# Patient Record
Sex: MALE | Race: WHITE | Employment: OTHER | URBAN - METROPOLITAN AREA
[De-identification: names, ages, dates, MRNs, and addresses within clinical notes are randomized per-mention and may not be internally consistent; named-entity substitution may affect disease eponyms.]

---

## 2018-01-02 ENCOUNTER — HOSPITAL ENCOUNTER (OUTPATIENT)
Dept: ENDOSCOPY | Age: 68
Discharge: HOME | End: 2018-01-21
Attending: INTERNAL MEDICINE | Admitting: INTERNAL MEDICINE

## 2018-01-03 RX ORDER — SODIUM CHLORIDE 9 MG/ML
INJECTION, SOLUTION INTRAVENOUS CONTINUOUS
Status: DISCONTINUED | OUTPATIENT
Start: 2018-01-03 | End: 2018-01-05 | Stop reason: HOSPADM

## 2018-01-04 ENCOUNTER — HOSPITAL ENCOUNTER (OUTPATIENT)
Dept: ENDOSCOPY | Age: 68
Discharge: HOME | End: 2018-01-04
Attending: INTERNAL MEDICINE | Admitting: INTERNAL MEDICINE

## 2018-01-04 VITALS
OXYGEN SATURATION: 97 % | WEIGHT: 194 LBS | TEMPERATURE: 98.8 F | RESPIRATION RATE: 16 BRPM | HEIGHT: 71 IN | SYSTOLIC BLOOD PRESSURE: 138 MMHG | HEART RATE: 110 BPM | BODY MASS INDEX: 27.16 KG/M2 | DIASTOLIC BLOOD PRESSURE: 83 MMHG

## 2018-01-04 RX ADMIN — SODIUM CHLORIDE: 9 INJECTION, SOLUTION INTRAVENOUS at 10:17

## 2018-01-04 ASSESSMENT — PAIN SCALES - GENERAL
PAINLEVEL_OUTOF10: 0
PAINLEVEL_OUTOF10: 0

## 2018-01-04 ASSESSMENT — ENCOUNTER SYMPTOMS: SHORTNESS OF BREATH: 1

## 2018-01-04 ASSESSMENT — PAIN - FUNCTIONAL ASSESSMENT: PAIN_FUNCTIONAL_ASSESSMENT: 0-10
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
## 2018-01-04 NOTE — PROGRESS NOTES
D/C instructions to pt and spouse. Verbalizes understanding. Copy of instructions to pt and spouse. D/c to home without distress.   Electronically signed by Anil Espitia RN on 1/4/2018 at 11:14 AM

## 2018-08-17 ENCOUNTER — PAT TELEPHONE (OUTPATIENT)
Dept: PREADMISSION TESTING | Age: 68
End: 2018-08-17

## 2018-08-17 VITALS — WEIGHT: 205 LBS | BODY MASS INDEX: 27.77 KG/M2 | HEIGHT: 72 IN

## 2018-08-17 RX ORDER — NADOLOL 20 MG/1
20 TABLET ORAL 3 TIMES DAILY
COMMUNITY
End: 2018-08-17 | Stop reason: ALTCHOICE

## 2018-08-17 RX ORDER — POTASSIUM CHLORIDE 1.5 G/1.77G
20 POWDER, FOR SOLUTION ORAL DAILY
Status: ON HOLD | COMMUNITY
End: 2019-05-29 | Stop reason: HOSPADM

## 2018-08-17 RX ORDER — LACTULOSE 10 G/15ML
20 SOLUTION ORAL; RECTAL 3 TIMES DAILY PRN
COMMUNITY
Start: 2017-10-19 | End: 2019-05-24

## 2018-08-17 RX ORDER — TORSEMIDE 20 MG/1
TABLET ORAL
Status: ON HOLD | COMMUNITY
Start: 2017-10-05 | End: 2019-07-25 | Stop reason: HOSPADM

## 2018-08-17 RX ORDER — MAGNESIUM CHLORIDE 64 MG
1 TABLET, DELAYED RELEASE (ENTERIC COATED) ORAL
COMMUNITY
End: 2018-08-17 | Stop reason: ALTCHOICE

## 2018-08-17 RX ORDER — TORSEMIDE 20 MG/1
20 TABLET ORAL DAILY
COMMUNITY
End: 2018-08-17 | Stop reason: ALTCHOICE

## 2018-08-17 RX ORDER — LANSOPRAZOLE 30 MG/1
30 CAPSULE, DELAYED RELEASE ORAL DAILY PRN
COMMUNITY
End: 2019-07-02

## 2018-08-17 RX ORDER — PROMETHAZINE HYDROCHLORIDE 12.5 MG/1
12.5 TABLET ORAL EVERY 6 HOURS PRN
COMMUNITY
Start: 2017-12-22 | End: 2019-05-24

## 2018-08-17 RX ORDER — POLYETHYLENE GLYCOL 3350 17 G/17G
17 POWDER, FOR SOLUTION ORAL DAILY PRN
Status: ON HOLD | COMMUNITY
End: 2019-05-29 | Stop reason: HOSPADM

## 2018-08-17 RX ORDER — THIAMINE MONONITRATE (VIT B1) 100 MG
100 TABLET ORAL DAILY
Status: ON HOLD | COMMUNITY
End: 2019-12-24

## 2018-08-17 ASSESSMENT — PAIN - FUNCTIONAL ASSESSMENT: PAIN_FUNCTIONAL_ASSESSMENT: 0-10

## 2018-08-17 ASSESSMENT — PAIN DESCRIPTION - PAIN TYPE: TYPE: CHRONIC PAIN

## 2018-08-17 ASSESSMENT — PAIN DESCRIPTION - LOCATION: LOCATION: HIP;KNEE;BACK

## 2018-08-17 ASSESSMENT — PAIN SCALES - GENERAL: PAINLEVEL_OUTOF10: 8

## 2018-08-17 ASSESSMENT — PAIN DESCRIPTION - FREQUENCY: FREQUENCY: CONTINUOUS

## 2018-08-17 NOTE — PROGRESS NOTES
STARTED THE PT INTERVIEW BUT BOTH PREETHI AND WIFE  WERE NOT SURE OF HIS MEDICATION DOSAGES. WIFE WILL CALL BACK LATER TODAY OR Monday WHEN SHE HAS A LIST TO REVIEW THEM.   8/17/18  315 AF

## 2018-08-17 NOTE — PRE-PROCEDURE INSTRUCTIONS
[address] [facility] time__1330__________        Surgery time___1500_________    Take the following medications with a sip of water: NADOLOL,CYMBALTA    Do not eat or drink anything after 12:00 midnight prior to your surgery. This includes water chewing gum, mints and ice chips. You may brush your teeth and gargle the morning of your surgery, but do not swallow the water     Please see your family doctor/pediatrician for a history and physical and/or concerning medications. Bring any test results/reports from your physicians office. If you are under the care of a heart doctor or specialist doctor, please be aware that you may be asked to them for clearance    You may be asked to stop blood thinners such as Coumadin, Plavix, Fragmin, Lovenox, etc., or any anti-inflammatories such as:  Aspirin, Ibuprofen, Advil, Naproxen prior to your surgery. We also ask that you stop any OTC medications such as fish oil, vitamin E, glucosamine, garlic, Multivitamins, COQ 10, etc.MAY TAKE TYLENOL    We ask that you do not smoke 24 hours prior to surgery  We ask that you do not  drink any alcoholic beverages 24 hours prior to surgery     You must make arrangements for a responsible adult to take you home after your surgery. For your safety you will not be allowed to leave alone or drive yourself home. Your surgery will be cancelled if you do not have a ride home. Also for your safety, it is strongly suggested that someone stay with you the first 24 hours after your surgery. A parent or legal guardian must accompany a child scheduled for surgery and plan to stay at the hospital until the child is discharged. Please do not bring other children with you. For your comfort, please wear simple loose fitting clothing to the hospital.  Please do not bring valuables.     Do not wear any make-up or nail polish on your fingers or toes      For your safety, please do not wear any jewelry or body piercing's on the day of surgery. All jewelry must be removed. If you have dentures, they will be removed before going to operating room. For your convenience, we will provide you with a container. If you wear contact lenses or glasses, they will be removed, please bring a case for them. If you have a living will and a durable power of  for healthcare, please bring in a copy. As part of our patient safety program to minimize surgical site infections, we ask you to do the following:    · Please notify your surgeon if you develop any illness between         now and the  day of your surgery. · This includes a cough, cold, fever, sore throat, nausea,         or vomiting, and diarrhea, etc.  ·  Please notify your surgeon if you experience dizziness, shortness         of breath or blurred vision between now and the time of your surgery. Do not shave your operative site 96 hours prior to surgery. For face and neck surgery, men may use an electric razor 48 hours   prior to surgery. You may shower the night before surgery or the morning of   your surgery with an antibacterial soap. You will need to bring a photo ID and insurance card    [facility] has an onsite pharmacy, would you like to utilize our pharmacy     If you will be staying overnight and use a C-pap machine, please bring   your C-pap to hospital     Our goal is to provide you with excellent care, therefore, visitors will be limited to two(2) in the room at a time so that we may focus on providing this care for you. Please contact pre-admission testing if you have any further questions. [facility] phone number:  [phone] [facility] fax number:  [phone]  Please note these are generalized instructions for all surgical cases, you may be provided with more specific instructions according to your surgery.

## 2018-08-20 ENCOUNTER — SURG/PROC ORDERS (OUTPATIENT)
Dept: ANESTHESIOLOGY | Age: 68
End: 2018-08-20

## 2018-08-20 LAB
A/G RATIO: 0.7 (ref 1.1–2.2)
ALBUMIN SERPL-MCNC: 2.7 G/DL (ref 3.4–5)
ALP BLD-CCNC: 139 U/L (ref 40–129)
ALT SERPL-CCNC: 36 U/L (ref 10–40)
ANION GAP SERPL CALCULATED.3IONS-SCNC: 10 MMOL/L (ref 3–16)
ANISOCYTOSIS: ABNORMAL
AST SERPL-CCNC: 79 U/L (ref 15–37)
BASOPHILS ABSOLUTE: 0.1 K/UL (ref 0–0.2)
BASOPHILS RELATIVE PERCENT: 2.1 %
BILIRUB SERPL-MCNC: 4.1 MG/DL (ref 0–1)
BUN BLDV-MCNC: 6 MG/DL (ref 7–20)
CALCIUM SERPL-MCNC: 9.1 MG/DL (ref 8.3–10.6)
CHLORIDE BLD-SCNC: 104 MMOL/L (ref 99–110)
CO2: 30 MMOL/L (ref 21–32)
CREAT SERPL-MCNC: 0.8 MG/DL (ref 0.8–1.3)
EOSINOPHILS ABSOLUTE: 0.1 K/UL (ref 0–0.6)
EOSINOPHILS RELATIVE PERCENT: 1.7 %
GFR AFRICAN AMERICAN: >60
GFR NON-AFRICAN AMERICAN: >60
GLOBULIN: 3.7 G/DL
GLUCOSE BLD-MCNC: 119 MG/DL (ref 70–99)
HCT VFR BLD CALC: 33.1 % (ref 40.5–52.5)
HEMATOLOGY PATH CONSULT: YES
HEMOGLOBIN: 11.2 G/DL (ref 13.5–17.5)
HYPOCHROMIA: ABNORMAL
LYMPHOCYTES ABSOLUTE: 1.6 K/UL (ref 1–5.1)
LYMPHOCYTES RELATIVE PERCENT: 49.8 %
MACROCYTES: ABNORMAL
MCH RBC QN AUTO: 38.5 PG (ref 26–34)
MCHC RBC AUTO-ENTMCNC: 33.9 G/DL (ref 31–36)
MCV RBC AUTO: 113.6 FL (ref 80–100)
MONOCYTES ABSOLUTE: 0.5 K/UL (ref 0–1.3)
MONOCYTES RELATIVE PERCENT: 15.2 %
NEUTROPHILS ABSOLUTE: 1 K/UL (ref 1.7–7.7)
NEUTROPHILS RELATIVE PERCENT: 31.2 %
PDW BLD-RTO: 16.2 % (ref 12.4–15.4)
PLATELET # BLD: 52 K/UL (ref 135–450)
PMV BLD AUTO: 10.7 FL (ref 5–10.5)
POIKILOCYTES: ABNORMAL
POTASSIUM SERPL-SCNC: 3.3 MMOL/L (ref 3.5–5.1)
RBC # BLD: 2.91 M/UL (ref 4.2–5.9)
SODIUM BLD-SCNC: 144 MMOL/L (ref 136–145)
TOTAL PROTEIN: 6.4 G/DL (ref 6.4–8.2)
WBC # BLD: 3.2 K/UL (ref 4–11)

## 2018-08-20 RX ORDER — SODIUM CHLORIDE 0.9 % (FLUSH) 0.9 %
10 SYRINGE (ML) INJECTION EVERY 12 HOURS SCHEDULED
Status: CANCELLED | OUTPATIENT
Start: 2018-08-20 | End: 2019-08-20

## 2018-08-20 RX ORDER — SODIUM CHLORIDE 0.9 % (FLUSH) 0.9 %
10 SYRINGE (ML) INJECTION PRN
Status: CANCELLED | OUTPATIENT
Start: 2018-08-20 | End: 2019-08-20

## 2018-08-20 RX ORDER — SODIUM CHLORIDE 9 MG/ML
INJECTION, SOLUTION INTRAVENOUS CONTINUOUS
Status: CANCELLED | OUTPATIENT
Start: 2018-08-20 | End: 2019-08-20

## 2018-08-21 LAB — HEMATOLOGY PATH CONSULT: NORMAL

## 2018-08-22 ENCOUNTER — HOSPITAL ENCOUNTER (OUTPATIENT)
Dept: ENDOSCOPY | Age: 68
Discharge: HOME | End: 2018-08-22
Attending: INTERNAL MEDICINE | Admitting: INTERNAL MEDICINE

## 2018-08-22 VITALS
OXYGEN SATURATION: 100 % | TEMPERATURE: 98 F | HEIGHT: 72 IN | BODY MASS INDEX: 27.05 KG/M2 | WEIGHT: 199.74 LBS | DIASTOLIC BLOOD PRESSURE: 85 MMHG | HEART RATE: 95 BPM | SYSTOLIC BLOOD PRESSURE: 156 MMHG | RESPIRATION RATE: 18 BRPM

## 2018-08-22 LAB
A/G RATIO: 0.7 (ref 1.1–2.2)
ALBUMIN SERPL-MCNC: 2.8 G/DL (ref 3.4–5)
ALP BLD-CCNC: 89 U/L (ref 40–129)
ALT SERPL-CCNC: 34 U/L (ref 10–40)
ANION GAP SERPL CALCULATED.3IONS-SCNC: 15 MMOL/L (ref 3–16)
AST SERPL-CCNC: 80 U/L (ref 15–37)
BILIRUB SERPL-MCNC: 7.9 MG/DL (ref 0–1)
BUN BLDV-MCNC: 10 MG/DL (ref 7–20)
CALCIUM SERPL-MCNC: 9 MG/DL (ref 8.3–10.6)
CHLORIDE BLD-SCNC: 104 MMOL/L (ref 99–110)
CO2: 22 MMOL/L (ref 21–32)
CREAT SERPL-MCNC: 0.7 MG/DL (ref 0.8–1.3)
GFR AFRICAN AMERICAN: >60
GFR NON-AFRICAN AMERICAN: >60
GLOBULIN: 4.2 G/DL
GLUCOSE BLD-MCNC: 84 MG/DL (ref 70–99)
MAGNESIUM: 1.1 MG/DL (ref 1.8–2.4)
POTASSIUM REFLEX MAGNESIUM: 3.3 MMOL/L (ref 3.5–5.1)
SODIUM BLD-SCNC: 141 MMOL/L (ref 136–145)
TOTAL PROTEIN: 7 G/DL (ref 6.4–8.2)

## 2018-08-22 PROCEDURE — 93010 ELECTROCARDIOGRAM REPORT: CPT | Performed by: INTERNAL MEDICINE

## 2018-08-22 RX ORDER — SODIUM CHLORIDE 0.9 % (FLUSH) 0.9 %
10 SYRINGE (ML) INJECTION PRN
Status: DISCONTINUED | OUTPATIENT
Start: 2018-08-22 | End: 2018-08-23 | Stop reason: HOSPADM

## 2018-08-22 RX ORDER — SODIUM CHLORIDE 0.9 % (FLUSH) 0.9 %
10 SYRINGE (ML) INJECTION EVERY 12 HOURS SCHEDULED
Status: DISCONTINUED | OUTPATIENT
Start: 2018-08-22 | End: 2018-08-23 | Stop reason: HOSPADM

## 2018-08-22 RX ORDER — SODIUM CHLORIDE 9 MG/ML
INJECTION, SOLUTION INTRAVENOUS CONTINUOUS
Status: DISCONTINUED | OUTPATIENT
Start: 2018-08-22 | End: 2018-08-23 | Stop reason: HOSPADM

## 2018-08-22 RX ADMIN — SODIUM CHLORIDE: 9 INJECTION, SOLUTION INTRAVENOUS at 14:03

## 2018-08-22 ASSESSMENT — LIFESTYLE VARIABLES: SMOKING_STATUS: 0

## 2018-08-22 ASSESSMENT — ENCOUNTER SYMPTOMS: SHORTNESS OF BREATH: 1

## 2018-08-22 ASSESSMENT — PAIN SCALES - GENERAL: PAINLEVEL_OUTOF10: 0

## 2018-08-22 ASSESSMENT — PAIN - FUNCTIONAL ASSESSMENT: PAIN_FUNCTIONAL_ASSESSMENT: 0-10
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
## 2018-08-22 NOTE — ANESTHESIA PRE-OP
Department of Anesthesiology  Preprocedure Note       Name:  Myrna Smith   Age:  80 y.o.  :  1950                                          MRN:  [id number]         Date:  2018      Surgeon:  Lenny Quesada    Procedure:  Colonoscopy    Medications prior to admission:   Prior to Admission medications    Medication Sig Start Date End Date Taking?  Authorizing Provider   vitamin B-1 (THIAMINE) 100 MG tablet Take 100 mg by mouth daily   Yes Historical Provider, MD   torsemide (DEMADEX) 20 MG tablet TAKE 1 TABLET BY MOUTH ONE TIME A DAY  10/5/17  Yes Historical Provider, MD   potassium chloride (KLOR-CON) 20 MEQ packet Take 20 mEq by mouth daily   Yes Historical Provider, MD   lansoprazole (PREVACID) 30 MG delayed release capsule Take 30 mg by mouth daily    Yes Historical Provider, MD   lactulose encephalopathy 10 GM/15ML SOLN solution Take 20 g by mouth 3 times daily as needed  10/19/17  Yes Historical Provider, MD   magnesium chloride (MAG DELAY) 535 (64 Mg) MG TBCR extended release tablet Take 64 mg by mouth 3 times daily  10/31/17  Yes Historical Provider, MD   polyethylene glycol (GLYCOLAX) packet Take 17 g by mouth daily as needed    Yes Historical Provider, MD   celecoxib (CELEBREX) 200 MG capsule TAKE ONE OR TWO CAPSULES BY MOUTH DAILY 3/7/16  Yes Historical Provider, MD   DULoxetine (CYMBALTA) 30 MG capsule TAKE 1 CAPSULE BY MOUTH ONE TIME A DAY increase to 2 qday after 2 weeks 11/6/15  Yes Historical Provider, MD   finasteride (PROSCAR) 5 MG tablet Take 5 mg by mouth daily  16  Yes Historical Provider, MD   folic acid (FOLVITE) 1 MG tablet Take 1 twice a day 3/7/16  Yes Historical Provider, MD   nadolol (CORGARD) 20 MG tablet Take 20 mg by mouth daily  11/19/15  Yes Historical Provider, MD   promethazine (PHENERGAN) 12.5 MG tablet Take 12.5 mg by mouth every 6 hours as needed  17   Historical Provider, MD       Current medications:    Current Outpatient Prescriptions   Medication Sig Dispense Refill    vitamin B-1 (THIAMINE) 100 MG tablet Take 100 mg by mouth daily      torsemide (DEMADEX) 20 MG tablet TAKE 1 TABLET BY MOUTH ONE TIME A DAY       potassium chloride (KLOR-CON) 20 MEQ packet Take 20 mEq by mouth daily      lansoprazole (PREVACID) 30 MG delayed release capsule Take 30 mg by mouth daily       lactulose encephalopathy 10 GM/15ML SOLN solution Take 20 g by mouth 3 times daily as needed       magnesium chloride (MAG DELAY) 535 (64 Mg) MG TBCR extended release tablet Take 64 mg by mouth 3 times daily       polyethylene glycol (GLYCOLAX) packet Take 17 g by mouth daily as needed       celecoxib (CELEBREX) 200 MG capsule TAKE ONE OR TWO CAPSULES BY MOUTH DAILY      DULoxetine (CYMBALTA) 30 MG capsule TAKE 1 CAPSULE BY MOUTH ONE TIME A DAY increase to 2 qday after 2 weeks      finasteride (PROSCAR) 5 MG tablet Take 5 mg by mouth daily       folic acid (FOLVITE) 1 MG tablet Take 1 twice a day      nadolol (CORGARD) 20 MG tablet Take 20 mg by mouth daily       promethazine (PHENERGAN) 12.5 MG tablet Take 12.5 mg by mouth every 6 hours as needed        Current Facility-Administered Medications   Medication Dose Route Frequency Provider Last Rate Last Dose    0.9 % sodium chloride infusion   Intravenous Continuous Dennis Loredo MD 75 mL/hr at 08/22/18 1403      sodium chloride flush 0.9 % injection 10 mL  10 mL Intravenous 2 times per day Dennis Loredo MD        sodium chloride flush 0.9 % injection 10 mL  10 mL Intravenous PRN Dennis Loredo MD           Allergies:  No Known Allergies    Problem List:  There is no problem list on file for this patient.       Past Medical History:        Diagnosis Date    Abnormality of gait     Alcohol dependence ([facility] [address]     Alcoholic cirrhosis (HCC)     Anxiety     Arthritis     rheumatoid arthritis    Basal cell carcinoma     Cerebral artery occlusion with cerebral infarction (HCC) 20 YEARS AGO    Circulation

## 2018-08-22 NOTE — PROGRESS NOTES
Patient came in PACU, alert and denies any pain, offered snack and tolerated. Discharge ninstruction given to wife.

## 2018-08-22 NOTE — ANESTHESIA POST-OP
[facility] Department of Anesthesiology  Post-Anesthesia Note       Name:  Akosua Iglesias                                  Age:  80 y.o. MRN:  [id number]     Last Vitals & Oxygen Saturation: BP (!) 156/85   Pulse 95   Temp 98 °F (36.7 °C)   Resp 18   Ht 6' (1.829 m)   Wt 199 lb 11.8 oz (90.6 kg)   SpO2 100%   BMI 27.09 kg/m²   Patient Vitals for the past 4 hrs:   BP Temp Temp src Pulse Resp SpO2 Height Weight   08/22/18 1610 (!) 156/85 - - 95 18 100 % - -   08/22/18 1546 129/76 98 °F (36.7 °C) - 75 12 100 % - -   08/22/18 1541 (!) 137/110 - - 76 20 99 % - -   08/22/18 1540 - - - 80 15 100 % - -   08/22/18 1536 122/77 - - 80 16 99 % - -   08/22/18 1535 - - - 80 16 100 % - -   08/22/18 1531 117/80 - - 85 17 95 % - -   08/22/18 1527 111/68 98.3 °F (36.8 °C) Temporal 89 18 99 % - -   08/22/18 1355 (!) 146/87 98.9 °F (37.2 °C) Temporal 103 16 99 % 6' (1.829 m) 199 lb 11.8 oz (90.6 kg)       Level of consciousness:  Awake, alert    Respiratory: Respirations easy, no distress. Stable. Cardiovascular: Hemodynamically stable. Hydration: Adequate. PONV: Adequately managed. Post-op pain: Adequately controlled. Post-op assessment: Tolerated anesthetic well without complication. Complications:  None.     Jim Keller MD  August 22, 2018   4:52 PM

## 2018-08-23 LAB
EKG ATRIAL RATE: 92 BPM
EKG DIAGNOSIS: NORMAL
EKG P AXIS: 30 DEGREES
EKG P-R INTERVAL: 148 MS
EKG Q-T INTERVAL: 428 MS
EKG QRS DURATION: 90 MS
EKG QTC CALCULATION (BAZETT): 529 MS
EKG R AXIS: 32 DEGREES
EKG T AXIS: 46 DEGREES
EKG VENTRICULAR RATE: 92 BPM

## 2018-08-27 LAB
HCT VFR BLD CALC: 34.4 % (ref 40.5–52.5)
HEMATOLOGY PATH CONSULT: NORMAL
HEMATOLOGY PATH CONSULT: YES
HEMOGLOBIN: 11.7 G/DL (ref 13.5–17.5)
MCH RBC QN AUTO: 38.6 PG (ref 26–34)
MCHC RBC AUTO-ENTMCNC: 34 G/DL (ref 31–36)
MCV RBC AUTO: 113.6 FL (ref 80–100)
PDW BLD-RTO: 15.6 % (ref 12.4–15.4)
PLATELET # BLD: 51 K/UL (ref 135–450)
PMV BLD AUTO: 10.9 FL (ref 5–10.5)
RBC # BLD: 3.03 M/UL (ref 4.2–5.9)
WBC # BLD: 3.7 K/UL (ref 4–11)

## 2019-05-24 ENCOUNTER — APPOINTMENT (OUTPATIENT)
Dept: GENERAL RADIOLOGY | Age: 69
DRG: 432 | End: 2019-05-24
Payer: MEDICARE

## 2019-05-24 ENCOUNTER — HOSPITAL ENCOUNTER (INPATIENT)
Age: 69
LOS: 5 days | Discharge: NURSING, REHAB OR LONG-TERM CARE | DRG: 432 | End: 2019-05-29
Attending: EMERGENCY MEDICINE | Admitting: INTERNAL MEDICINE
Payer: MEDICARE

## 2019-05-24 ENCOUNTER — APPOINTMENT (OUTPATIENT)
Dept: CT IMAGING | Age: 69
DRG: 432 | End: 2019-05-24
Payer: MEDICARE
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
## 2019-05-24 DIAGNOSIS — D18.1 HYGROMA: ICD-10-CM

## 2019-05-24 DIAGNOSIS — K76.82 HEPATIC ENCEPHALOPATHY: Primary | ICD-10-CM

## 2019-05-24 PROBLEM — N39.0 URINARY TRACT INFECTION ASSOCIATED WITH CYSTOSTOMY CATHETER (HCC): Status: ACTIVE | Noted: 2019-05-24

## 2019-05-24 PROBLEM — E72.20 HYPERAMMONEMIA (HCC): Status: ACTIVE | Noted: 2019-05-24

## 2019-05-24 PROBLEM — R53.1 GENERALIZED WEAKNESS: Status: ACTIVE | Noted: 2019-05-24

## 2019-05-24 PROBLEM — I48.0 PAROXYSMAL ATRIAL FIBRILLATION (HCC): Status: ACTIVE | Noted: 2019-05-24

## 2019-05-24 PROBLEM — D69.6 THROMBOCYTOPENIA (HCC): Status: ACTIVE | Noted: 2019-05-24

## 2019-05-24 PROBLEM — E80.6 HYPERBILIRUBINEMIA: Status: ACTIVE | Noted: 2019-05-24

## 2019-05-24 PROBLEM — E83.42 HYPOMAGNESEMIA: Status: ACTIVE | Noted: 2019-05-24

## 2019-05-24 PROBLEM — E87.6 HYPOKALEMIA: Status: ACTIVE | Noted: 2019-05-24

## 2019-05-24 PROBLEM — N30.01 ACUTE CYSTITIS WITH HEMATURIA: Status: ACTIVE | Noted: 2019-05-24

## 2019-05-24 PROBLEM — T83.510A URINARY TRACT INFECTION ASSOCIATED WITH CYSTOSTOMY CATHETER (HCC): Status: ACTIVE | Noted: 2019-05-24

## 2019-05-24 LAB
A/G RATIO: 0.6 (ref 1.1–2.2)
ALBUMIN SERPL-MCNC: 2.8 G/DL (ref 3.4–5)
ALP BLD-CCNC: 129 U/L (ref 40–129)
ALT SERPL-CCNC: 14 U/L (ref 10–40)
AMMONIA: 105 UMOL/L (ref 16–60)
AMPHETAMINE SCREEN, URINE: NORMAL
ANION GAP SERPL CALCULATED.3IONS-SCNC: 12 MMOL/L (ref 3–16)
AST SERPL-CCNC: 32 U/L (ref 15–37)
BACTERIA: ABNORMAL /HPF
BARBITURATE SCREEN URINE: NORMAL
BASE EXCESS VENOUS: 6.5 MMOL/L
BASOPHILS ABSOLUTE: 0.1 K/UL (ref 0–0.2)
BASOPHILS RELATIVE PERCENT: 1.1 %
BENZODIAZEPINE SCREEN, URINE: NORMAL
BILIRUB SERPL-MCNC: 2.8 MG/DL (ref 0–1)
BILIRUBIN URINE: NEGATIVE
BLOOD, URINE: ABNORMAL
BUN BLDV-MCNC: 10 MG/DL (ref 7–20)
CALCIUM SERPL-MCNC: 9.1 MG/DL (ref 8.3–10.6)
CANNABINOID SCREEN URINE: NORMAL
CARBOXYHEMOGLOBIN: 2 %
CHLORIDE BLD-SCNC: 100 MMOL/L (ref 99–110)
CLARITY: ABNORMAL
CO2: 29 MMOL/L (ref 21–32)
COCAINE METABOLITE SCREEN URINE: NORMAL
COLOR: YELLOW
CREAT SERPL-MCNC: 1.3 MG/DL (ref 0.8–1.3)
EKG ATRIAL RATE: 54 BPM
EKG DIAGNOSIS: NORMAL
EKG P AXIS: 13 DEGREES
EKG P-R INTERVAL: 170 MS
EKG Q-T INTERVAL: 652 MS
EKG QRS DURATION: 102 MS
EKG QTC CALCULATION (BAZETT): 618 MS
EKG R AXIS: 21 DEGREES
EKG T AXIS: 18 DEGREES
EKG VENTRICULAR RATE: 54 BPM
EOSINOPHILS ABSOLUTE: 0.1 K/UL (ref 0–0.6)
EOSINOPHILS RELATIVE PERCENT: 2.4 %
EPITHELIAL CELLS, UA: 1 /HPF (ref 0–5)
ETHANOL: NORMAL MG/DL (ref 0–0.08)
GFR AFRICAN AMERICAN: >60
GFR NON-AFRICAN AMERICAN: 55
GLOBULIN: 4.6 G/DL
GLUCOSE BLD-MCNC: 125 MG/DL (ref 70–99)
GLUCOSE URINE: NEGATIVE MG/DL
HCO3 VENOUS: 32 MMOL/L (ref 23–29)
HCT VFR BLD CALC: 36.5 % (ref 40.5–52.5)
HEMOGLOBIN: 12.4 G/DL (ref 13.5–17.5)
HYALINE CASTS: 12 /LPF (ref 0–8)
INR BLD: 1.46 (ref 0.86–1.14)
KETONES, URINE: NEGATIVE MG/DL
LACTIC ACID: 2.1 MMOL/L (ref 0.4–2)
LEUKOCYTE ESTERASE, URINE: ABNORMAL
LIPASE: 53 U/L (ref 13–60)
LYMPHOCYTES ABSOLUTE: 1.6 K/UL (ref 1–5.1)
LYMPHOCYTES RELATIVE PERCENT: 35.4 %
Lab: NORMAL
MAGNESIUM: 1.7 MG/DL (ref 1.8–2.4)
MCH RBC QN AUTO: 36.5 PG (ref 26–34)
MCHC RBC AUTO-ENTMCNC: 33.9 G/DL (ref 31–36)
MCV RBC AUTO: 107.9 FL (ref 80–100)
METHADONE SCREEN, URINE: NORMAL
METHEMOGLOBIN VENOUS: 0.6 %
MICROSCOPIC EXAMINATION: YES
MONOCYTES ABSOLUTE: 0.8 K/UL (ref 0–1.3)
MONOCYTES RELATIVE PERCENT: 17 %
NEUTROPHILS ABSOLUTE: 2 K/UL (ref 1.7–7.7)
NEUTROPHILS RELATIVE PERCENT: 44.1 %
NITRITE, URINE: NEGATIVE
O2 CONTENT, VEN: 12 ML/DL
O2 SAT, VEN: 65 %
O2 THERAPY: ABNORMAL
OPIATE SCREEN URINE: NORMAL
OXYCODONE URINE: NORMAL
PCO2, VEN: 50.7 MMHG (ref 40–50)
PDW BLD-RTO: 16.5 % (ref 12.4–15.4)
PH UA: 7
PH UA: 7 (ref 5–8)
PH VENOUS: 7.42 (ref 7.35–7.45)
PHENCYCLIDINE SCREEN URINE: NORMAL
PLATELET # BLD: 89 K/UL (ref 135–450)
PMV BLD AUTO: 9.8 FL (ref 5–10.5)
PO2, VEN: 37 MMHG
POTASSIUM REFLEX MAGNESIUM: 3 MMOL/L (ref 3.5–5.1)
PRO-BNP: 510 PG/ML (ref 0–124)
PROPOXYPHENE SCREEN: NORMAL
PROTEIN UA: NEGATIVE MG/DL
PROTHROMBIN TIME: 16.7 SEC (ref 9.8–13)
RBC # BLD: 3.39 M/UL (ref 4.2–5.9)
RBC UA: 22 /HPF (ref 0–4)
SODIUM BLD-SCNC: 141 MMOL/L (ref 136–145)
SPECIFIC GRAVITY UA: 1.01 (ref 1–1.03)
TCO2 CALC VENOUS: 33 MMOL/L
TOTAL PROTEIN: 7.4 G/DL (ref 6.4–8.2)
TROPONIN: <0.01 NG/ML
URINE REFLEX TO CULTURE: YES
URINE TYPE: ABNORMAL
UROBILINOGEN, URINE: 2 E.U./DL
WBC # BLD: 4.6 K/UL (ref 4–11)
WBC UA: 28 /HPF (ref 0–5)

## 2019-05-24 PROCEDURE — 2500000003 HC RX 250 WO HCPCS: Performed by: INTERNAL MEDICINE

## 2019-05-24 PROCEDURE — 6370000000 HC RX 637 (ALT 250 FOR IP): Performed by: INTERNAL MEDICINE

## 2019-05-24 PROCEDURE — 6360000002 HC RX W HCPCS: Performed by: INTERNAL MEDICINE

## 2019-05-24 PROCEDURE — 82803 BLOOD GASES ANY COMBINATION: CPT

## 2019-05-24 PROCEDURE — 71045 X-RAY EXAM CHEST 1 VIEW: CPT

## 2019-05-24 PROCEDURE — 70450 CT HEAD/BRAIN W/O DYE: CPT

## 2019-05-24 PROCEDURE — 83880 ASSAY OF NATRIURETIC PEPTIDE: CPT

## 2019-05-24 PROCEDURE — 87186 SC STD MICRODIL/AGAR DIL: CPT

## 2019-05-24 PROCEDURE — 93005 ELECTROCARDIOGRAM TRACING: CPT | Performed by: EMERGENCY MEDICINE

## 2019-05-24 PROCEDURE — 85610 PROTHROMBIN TIME: CPT

## 2019-05-24 PROCEDURE — 82140 ASSAY OF AMMONIA: CPT

## 2019-05-24 PROCEDURE — 83690 ASSAY OF LIPASE: CPT

## 2019-05-24 PROCEDURE — 72170 X-RAY EXAM OF PELVIS: CPT

## 2019-05-24 PROCEDURE — 2580000003 HC RX 258: Performed by: INTERNAL MEDICINE

## 2019-05-24 PROCEDURE — 87086 URINE CULTURE/COLONY COUNT: CPT

## 2019-05-24 PROCEDURE — 93010 ELECTROCARDIOGRAM REPORT: CPT | Performed by: INTERNAL MEDICINE

## 2019-05-24 PROCEDURE — 83605 ASSAY OF LACTIC ACID: CPT

## 2019-05-24 PROCEDURE — 83735 ASSAY OF MAGNESIUM: CPT

## 2019-05-24 PROCEDURE — 2580000003 HC RX 258: Performed by: EMERGENCY MEDICINE

## 2019-05-24 PROCEDURE — 99285 EMERGENCY DEPT VISIT HI MDM: CPT

## 2019-05-24 PROCEDURE — 96365 THER/PROPH/DIAG IV INF INIT: CPT

## 2019-05-24 PROCEDURE — G0480 DRUG TEST DEF 1-7 CLASSES: HCPCS

## 2019-05-24 PROCEDURE — 85025 COMPLETE CBC W/AUTO DIFF WBC: CPT

## 2019-05-24 PROCEDURE — 81001 URINALYSIS AUTO W/SCOPE: CPT

## 2019-05-24 PROCEDURE — 87077 CULTURE AEROBIC IDENTIFY: CPT

## 2019-05-24 PROCEDURE — 84484 ASSAY OF TROPONIN QUANT: CPT

## 2019-05-24 PROCEDURE — 80053 COMPREHEN METABOLIC PANEL: CPT

## 2019-05-24 PROCEDURE — 80307 DRUG TEST PRSMV CHEM ANLYZR: CPT

## 2019-05-24 PROCEDURE — 87184 SC STD DISK METHOD PER PLATE: CPT

## 2019-05-24 PROCEDURE — 1200000000 HC SEMI PRIVATE

## 2019-05-24 RX ORDER — AMIODARONE HYDROCHLORIDE 200 MG/1
200 TABLET ORAL DAILY
Status: DISCONTINUED | OUTPATIENT
Start: 2019-05-24 | End: 2019-05-29 | Stop reason: HOSPADM

## 2019-05-24 RX ORDER — ONDANSETRON 2 MG/ML
4 INJECTION INTRAMUSCULAR; INTRAVENOUS EVERY 4 HOURS PRN
Status: DISCONTINUED | OUTPATIENT
Start: 2019-05-24 | End: 2019-05-29 | Stop reason: HOSPADM

## 2019-05-24 RX ORDER — NADOLOL 40 MG/1
20 TABLET ORAL DAILY
Status: DISCONTINUED | OUTPATIENT
Start: 2019-05-24 | End: 2019-05-29 | Stop reason: HOSPADM

## 2019-05-24 RX ORDER — LACTULOSE 10 G/15ML
30 SOLUTION ORAL 3 TIMES DAILY
Status: DISCONTINUED | OUTPATIENT
Start: 2019-05-24 | End: 2019-05-28

## 2019-05-24 RX ORDER — TORSEMIDE 20 MG/1
20 TABLET ORAL DAILY
Status: DISCONTINUED | OUTPATIENT
Start: 2019-05-24 | End: 2019-05-29 | Stop reason: HOSPADM

## 2019-05-24 RX ORDER — TAMSULOSIN HYDROCHLORIDE 0.4 MG/1
0.4 CAPSULE ORAL NIGHTLY
Status: DISCONTINUED | OUTPATIENT
Start: 2019-05-24 | End: 2019-05-29 | Stop reason: HOSPADM

## 2019-05-24 RX ORDER — MUPIROCIN CALCIUM 20 MG/G
CREAM TOPICAL DAILY PRN
Status: DISCONTINUED | OUTPATIENT
Start: 2019-05-24 | End: 2019-05-24

## 2019-05-24 RX ORDER — SODIUM CHLORIDE 0.9 % (FLUSH) 0.9 %
10 SYRINGE (ML) INJECTION EVERY 12 HOURS SCHEDULED
Status: DISCONTINUED | OUTPATIENT
Start: 2019-05-24 | End: 2019-05-29 | Stop reason: HOSPADM

## 2019-05-24 RX ORDER — ACETAMINOPHEN 325 MG/1
650 TABLET ORAL EVERY 6 HOURS PRN
Status: DISCONTINUED | OUTPATIENT
Start: 2019-05-24 | End: 2019-05-29 | Stop reason: HOSPADM

## 2019-05-24 RX ORDER — MUPIROCIN CALCIUM 20 MG/G
CREAM TOPICAL 3 TIMES DAILY PRN
Status: ON HOLD | COMMUNITY
End: 2019-12-04 | Stop reason: HOSPADM

## 2019-05-24 RX ORDER — 0.9 % SODIUM CHLORIDE 0.9 %
1000 INTRAVENOUS SOLUTION INTRAVENOUS ONCE
Status: COMPLETED | OUTPATIENT
Start: 2019-05-24 | End: 2019-05-24

## 2019-05-24 RX ORDER — DULOXETIN HYDROCHLORIDE 30 MG/1
30 CAPSULE, DELAYED RELEASE ORAL DAILY
Status: DISCONTINUED | OUTPATIENT
Start: 2019-05-24 | End: 2019-05-26

## 2019-05-24 RX ORDER — FAMOTIDINE 20 MG/1
20 TABLET, FILM COATED ORAL 2 TIMES DAILY
Status: DISCONTINUED | OUTPATIENT
Start: 2019-05-24 | End: 2019-05-29 | Stop reason: HOSPADM

## 2019-05-24 RX ORDER — TAMSULOSIN HYDROCHLORIDE 0.4 MG/1
0.4 CAPSULE ORAL NIGHTLY
Status: ON HOLD | COMMUNITY
End: 2019-12-24

## 2019-05-24 RX ORDER — AMIODARONE HYDROCHLORIDE 200 MG/1
200 TABLET ORAL DAILY
COMMUNITY

## 2019-05-24 RX ORDER — POTASSIUM CHLORIDE 750 MG/1
40 TABLET, FILM COATED, EXTENDED RELEASE ORAL ONCE
Status: COMPLETED | OUTPATIENT
Start: 2019-05-24 | End: 2019-05-24

## 2019-05-24 RX ORDER — MAGNESIUM SULFATE IN WATER 40 MG/ML
2 INJECTION, SOLUTION INTRAVENOUS ONCE
Status: COMPLETED | OUTPATIENT
Start: 2019-05-24 | End: 2019-05-24

## 2019-05-24 RX ORDER — SODIUM CHLORIDE 0.9 % (FLUSH) 0.9 %
10 SYRINGE (ML) INJECTION PRN
Status: DISCONTINUED | OUTPATIENT
Start: 2019-05-24 | End: 2019-05-29 | Stop reason: HOSPADM

## 2019-05-24 RX ORDER — SODIUM CHLORIDE 9 MG/ML
INJECTION, SOLUTION INTRAVENOUS CONTINUOUS
Status: DISCONTINUED | OUTPATIENT
Start: 2019-05-24 | End: 2019-05-26

## 2019-05-24 RX ORDER — DULOXETIN HYDROCHLORIDE 30 MG/1
30 CAPSULE, DELAYED RELEASE ORAL DAILY
COMMUNITY

## 2019-05-24 RX ORDER — MUPIROCIN CALCIUM 20 MG/G
CREAM TOPICAL DAILY
Status: DISCONTINUED | OUTPATIENT
Start: 2019-05-25 | End: 2019-05-29 | Stop reason: HOSPADM

## 2019-05-24 RX ADMIN — LACTULOSE 30 G: 20 SOLUTION ORAL at 20:50

## 2019-05-24 RX ADMIN — SODIUM CHLORIDE 1000 ML: 9 INJECTION, SOLUTION INTRAVENOUS at 15:13

## 2019-05-24 RX ADMIN — MICONAZOLE NITRATE 1 APPLICATOR: 20 POWDER TOPICAL at 21:49

## 2019-05-24 RX ADMIN — TORSEMIDE 20 MG: 20 TABLET ORAL at 18:14

## 2019-05-24 RX ADMIN — MAGNESIUM SULFATE HEPTAHYDRATE 2 G: 40 INJECTION, SOLUTION INTRAVENOUS at 15:14

## 2019-05-24 RX ADMIN — PIPERACILLIN SODIUM,TAZOBACTAM SODIUM 3.38 G: 3; .375 INJECTION, POWDER, FOR SOLUTION INTRAVENOUS at 18:06

## 2019-05-24 RX ADMIN — FAMOTIDINE 20 MG: 20 TABLET ORAL at 20:51

## 2019-05-24 RX ADMIN — RIFAXIMIN 550 MG: 550 TABLET ORAL at 18:14

## 2019-05-24 RX ADMIN — SODIUM CHLORIDE: 9 INJECTION, SOLUTION INTRAVENOUS at 18:05

## 2019-05-24 RX ADMIN — ENOXAPARIN SODIUM 40 MG: 40 INJECTION SUBCUTANEOUS at 20:51

## 2019-05-24 RX ADMIN — TAMSULOSIN HYDROCHLORIDE 0.4 MG: 0.4 CAPSULE ORAL at 20:51

## 2019-05-24 RX ADMIN — Medication 10 ML: at 20:50

## 2019-05-24 RX ADMIN — DULOXETINE HYDROCHLORIDE 30 MG: 30 CAPSULE, DELAYED RELEASE ORAL at 18:14

## 2019-05-24 RX ADMIN — LACTULOSE 30 G: 20 SOLUTION ORAL at 15:19

## 2019-05-24 RX ADMIN — POTASSIUM CHLORIDE 40 MEQ: 750 TABLET, EXTENDED RELEASE ORAL at 18:14

## 2019-05-24 ASSESSMENT — ENCOUNTER SYMPTOMS
SHORTNESS OF BREATH: 0
EYE REDNESS: 0
RHINORRHEA: 0
VOMITING: 0
EYE DISCHARGE: 0
ABDOMINAL PAIN: 0
COUGH: 0
EYE PAIN: 0
BACK PAIN: 0
SORE THROAT: 0
NAUSEA: 0
WHEEZING: 0
DIARRHEA: 0

## 2019-05-24 ASSESSMENT — PAIN SCALES - GENERAL: PAINLEVEL_OUTOF10: 0

## 2019-05-24 NOTE — PROGRESS NOTES
Patient arrived to room 3113 from ED. Patient has no complaints of pain. Oriented patient to room and call light, patient denies any further needs at this time.
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
## 2019-05-24 NOTE — PROGRESS NOTES
4 Eyes Skin Assessment     The patient is being assess for  Admission    I agree that 2 RN's have performed a thorough Head to Toe Skin Assessment on the patient. ALL assessment sites listed below have been assessed. Areas assessed by both nurses: Yes  [x]   Head, Face, and Ears   [x]   Shoulders, Back, and Chest  [x]   Arms, Elbows, and Hands   [x]   Coccyx, Sacrum, and IschIum  [x]   Legs, Feet, and Heels        Does the Patient have Skin Breakdown?   No pressure injury, but scattered abrasions, skin tears, and scabs         Julio Prevention initiated:  Yes   Wound Care Orders initiated:  No      C nurse consulted for Pressure Injury (Stage 3,4, Unstageable, DTI, NWPT, and Complex wounds), New and Established Ostomies:  NA      Nurse 1 eSignature: Electronically signed by Jaycee Estrella RN on 5/24/19 at 7:10 PM    **SHARE this note so that the co-signing nurse is able to place an eSignature**    Nurse 2 eSignature: Electronically signed by Jodi Lackey RN on 5/25/19 at 0408

## 2019-05-24 NOTE — PLAN OF CARE
Problem: Falls - Risk of:  Goal: Will remain free from falls  Description  Will remain free from falls  Outcome: Ongoing  Note:   Patient educated on fall prevention. Call light is within reach, bed locked in lowest position, personal items within reach, and bed alarm is on. Will round on patient per unit guidelines. Problem: Falls - Risk of:  Goal: Absence of physical injury  Description  Absence of physical injury  Outcome: Ongoing  Note:   Pt assessed for fall risk and fall precautions put into place. Bed in lowest position and wheels locked, call light within reach. Nonskid footwear in place. Patient educated on appropriate method of transfer and to call for assistance. Problem: Skin Integrity:  Goal: Will show no infection signs and symptoms  Description  Will show no infection signs and symptoms  Outcome: Ongoing  Note:   Will monitor skin and mucous members. Will turn patient every 2 hours, monitor for friction and sheering, and change dressings as needed. Will preform skin assessment every shift. Problem: Skin Integrity:  Goal: Absence of new skin breakdown  Description  Absence of new skin breakdown  Outcome: Ongoing  Note:   Will monitor skin and mucous members. Will turn patient every 2 hours, monitor for friction and sheering, and change dressings as needed. Will preform skin assessment every shift.

## 2019-05-24 NOTE — PROGRESS NOTES
Pharmacy Medication Reconciliation Note     List of medications patient is currently taking is complete. Allergies:  Patient has no known allergies. Source of information:   1. Patient list  2. wife    Notes regarding home medications:   1. Reports he had not had any medication yet today.    2. Was not taking lactulose or xifaxan at home according to most recent d/c list from hospital.     Denies taking any other OTC or herbal medications    Alex Kurtz PharmD, BCPS  5/24/2019  5:03 PM

## 2019-05-24 NOTE — ED NOTES
Bed: B-  Expected date: 5/24/19  Expected time: 12:05 PM  Means of arrival: [facility]  Comments:  69F weakness     Rio Dunn RN  05/24/19 9019

## 2019-05-24 NOTE — PROGRESS NOTES
Patient A/O to person, place and time, could not recall why he was being admitted to the hospital. Fall precautions are in place, bed is in lowest position, and call light is within reach. Will continue to monitor and assess.

## 2019-05-24 NOTE — ED TRIAGE NOTES
Pt is a 77year old male who presents in the ED today via EMS for frequent falls and weakness. Pt's wife told EMS that he has fallen 3 times today and has not been himself since last night. Pt had recent surgery and the incisions on his buttocks are healing but one of the incisions has opened up. It appears open but intact. Pt is alert and oriented x4. Pt was placed on the monitor. Breathing is easy, even and unlabored. Skin is cool and dry. Pt has a superpubic catheter with a leg bag connected to his left leg. Fall risk screening completed. Fall risk bracelet applied to patient. Non-skid socks provided and placed on patient. The fall risk is indicated using  dome light . Based on score, a bed alarm was indicated and applied. The call light is within the patient's reach, and instructions for use were provided. The bed is in the lowest position with wheels locked. The patient has been advised to notify staff, using the call light, if there is a need to get up or use restroom. The patient verbalized understanding of safety precautions and how to contact staff for assistance.

## 2019-05-25 LAB
AMMONIA: 125 UMOL/L (ref 16–60)
ANION GAP SERPL CALCULATED.3IONS-SCNC: 13 MMOL/L (ref 3–16)
BASOPHILS ABSOLUTE: 0.1 K/UL (ref 0–0.2)
BASOPHILS RELATIVE PERCENT: 1.2 %
BUN BLDV-MCNC: 12 MG/DL (ref 7–20)
CALCIUM SERPL-MCNC: 8.5 MG/DL (ref 8.3–10.6)
CHLORIDE BLD-SCNC: 104 MMOL/L (ref 99–110)
CO2: 25 MMOL/L (ref 21–32)
CREAT SERPL-MCNC: 1.1 MG/DL (ref 0.8–1.3)
EOSINOPHILS ABSOLUTE: 0.1 K/UL (ref 0–0.6)
EOSINOPHILS RELATIVE PERCENT: 2.5 %
GFR AFRICAN AMERICAN: >60
GFR NON-AFRICAN AMERICAN: >60
GLUCOSE BLD-MCNC: 120 MG/DL (ref 70–99)
HCT VFR BLD CALC: 33.9 % (ref 40.5–52.5)
HEMOGLOBIN: 11.5 G/DL (ref 13.5–17.5)
LYMPHOCYTES ABSOLUTE: 1.9 K/UL (ref 1–5.1)
LYMPHOCYTES RELATIVE PERCENT: 41 %
MAGNESIUM: 2.1 MG/DL (ref 1.8–2.4)
MCH RBC QN AUTO: 37.3 PG (ref 26–34)
MCHC RBC AUTO-ENTMCNC: 34.1 G/DL (ref 31–36)
MCV RBC AUTO: 109.3 FL (ref 80–100)
MONOCYTES ABSOLUTE: 0.8 K/UL (ref 0–1.3)
MONOCYTES RELATIVE PERCENT: 16.1 %
NEUTROPHILS ABSOLUTE: 1.8 K/UL (ref 1.7–7.7)
NEUTROPHILS RELATIVE PERCENT: 39.2 %
PDW BLD-RTO: 16.3 % (ref 12.4–15.4)
PLATELET # BLD: 78 K/UL (ref 135–450)
PMV BLD AUTO: 9.5 FL (ref 5–10.5)
POTASSIUM REFLEX MAGNESIUM: 3 MMOL/L (ref 3.5–5.1)
RBC # BLD: 3.1 M/UL (ref 4.2–5.9)
SODIUM BLD-SCNC: 142 MMOL/L (ref 136–145)
WBC # BLD: 4.7 K/UL (ref 4–11)

## 2019-05-25 PROCEDURE — 85025 COMPLETE CBC W/AUTO DIFF WBC: CPT

## 2019-05-25 PROCEDURE — 6370000000 HC RX 637 (ALT 250 FOR IP): Performed by: INTERNAL MEDICINE

## 2019-05-25 PROCEDURE — 97162 PT EVAL MOD COMPLEX 30 MIN: CPT

## 2019-05-25 PROCEDURE — 97530 THERAPEUTIC ACTIVITIES: CPT

## 2019-05-25 PROCEDURE — 6360000002 HC RX W HCPCS: Performed by: INTERNAL MEDICINE

## 2019-05-25 PROCEDURE — 2580000003 HC RX 258: Performed by: INTERNAL MEDICINE

## 2019-05-25 PROCEDURE — 97166 OT EVAL MOD COMPLEX 45 MIN: CPT

## 2019-05-25 PROCEDURE — 83735 ASSAY OF MAGNESIUM: CPT

## 2019-05-25 PROCEDURE — 36415 COLL VENOUS BLD VENIPUNCTURE: CPT

## 2019-05-25 PROCEDURE — 82140 ASSAY OF AMMONIA: CPT

## 2019-05-25 PROCEDURE — 1200000000 HC SEMI PRIVATE

## 2019-05-25 PROCEDURE — 80048 BASIC METABOLIC PNL TOTAL CA: CPT

## 2019-05-25 RX ORDER — POTASSIUM CHLORIDE 1.5 G/1.77G
40 POWDER, FOR SOLUTION ORAL 2 TIMES DAILY
Status: COMPLETED | OUTPATIENT
Start: 2019-05-25 | End: 2019-05-25

## 2019-05-25 RX ADMIN — MICONAZOLE NITRATE: 20 POWDER TOPICAL at 08:31

## 2019-05-25 RX ADMIN — PIPERACILLIN SODIUM,TAZOBACTAM SODIUM 3.38 G: 3; .375 INJECTION, POWDER, FOR SOLUTION INTRAVENOUS at 16:39

## 2019-05-25 RX ADMIN — LACTULOSE 30 G: 20 SOLUTION ORAL at 21:13

## 2019-05-25 RX ADMIN — SODIUM CHLORIDE 75 ML/HR: 9 INJECTION, SOLUTION INTRAVENOUS at 21:18

## 2019-05-25 RX ADMIN — FAMOTIDINE 20 MG: 20 TABLET ORAL at 21:15

## 2019-05-25 RX ADMIN — POTASSIUM CHLORIDE 40 MEQ: 1.5 POWDER, FOR SOLUTION ORAL at 08:30

## 2019-05-25 RX ADMIN — Medication 10 ML: at 08:30

## 2019-05-25 RX ADMIN — LACTULOSE 30 G: 20 SOLUTION ORAL at 08:28

## 2019-05-25 RX ADMIN — LACTULOSE 30 G: 20 SOLUTION ORAL at 16:38

## 2019-05-25 RX ADMIN — AMIODARONE HYDROCHLORIDE 200 MG: 200 TABLET ORAL at 08:30

## 2019-05-25 RX ADMIN — TAMSULOSIN HYDROCHLORIDE 0.4 MG: 0.4 CAPSULE ORAL at 21:15

## 2019-05-25 RX ADMIN — PIPERACILLIN SODIUM,TAZOBACTAM SODIUM 3.38 G: 3; .375 INJECTION, POWDER, FOR SOLUTION INTRAVENOUS at 08:29

## 2019-05-25 RX ADMIN — Medication 10 ML: at 21:18

## 2019-05-25 RX ADMIN — DULOXETINE HYDROCHLORIDE 30 MG: 30 CAPSULE, DELAYED RELEASE ORAL at 08:30

## 2019-05-25 RX ADMIN — TORSEMIDE 20 MG: 20 TABLET ORAL at 08:31

## 2019-05-25 RX ADMIN — ENOXAPARIN SODIUM 40 MG: 40 INJECTION SUBCUTANEOUS at 21:19

## 2019-05-25 RX ADMIN — MICONAZOLE NITRATE: 20 POWDER TOPICAL at 21:19

## 2019-05-25 RX ADMIN — RIFAXIMIN 550 MG: 550 TABLET ORAL at 08:31

## 2019-05-25 RX ADMIN — FAMOTIDINE 20 MG: 20 TABLET ORAL at 08:31

## 2019-05-25 RX ADMIN — PIPERACILLIN SODIUM,TAZOBACTAM SODIUM 3.38 G: 3; .375 INJECTION, POWDER, FOR SOLUTION INTRAVENOUS at 00:49

## 2019-05-25 RX ADMIN — MUPIROCIN: 2 CREAM TOPICAL at 08:29

## 2019-05-25 RX ADMIN — SODIUM CHLORIDE 75 ML/HR: 9 INJECTION, SOLUTION INTRAVENOUS at 05:13

## 2019-05-25 RX ADMIN — POTASSIUM CHLORIDE 40 MEQ: 1.5 POWDER, FOR SOLUTION ORAL at 21:15

## 2019-05-25 RX ADMIN — RIFAXIMIN 550 MG: 550 TABLET ORAL at 21:18

## 2019-05-25 ASSESSMENT — PAIN SCALES - GENERAL: PAINLEVEL_OUTOF10: 0

## 2019-05-25 ASSESSMENT — PAIN DESCRIPTION - PAIN TYPE: TYPE: OTHER (COMMENT)

## 2019-05-25 NOTE — FLOWSHEET NOTE
Pt remained confused and lethargic for the majority of my shift, although he is easy to arouse. RN watched pt drink Lactulose 2x during shift, and had one medium BM around 1830. MD was aware of the morning ammonia level of 125.

## 2019-05-25 NOTE — DISCHARGE INSTR - COC
Signs: BP (!) 92/57   Pulse 58   Temp 98.3 °F (36.8 °C) (Oral)   Resp 18   Ht 6' (1.829 m)   Wt 149 lb 4 oz (67.7 kg)   SpO2 94%   BMI 20.24 kg/m²     Last documented pain score (0-10 scale): Pain Level: 0  Last Weight:   Wt Readings from Last 1 Encounters:   05/25/19 149 lb 4 oz (67.7 kg)     Mental Status:  alert    IV Access:  - None    Nursing Mobility/ADLs:  Walking   Assisted  Transfer  Assisted  Bathing  Assisted  Dressing  Assisted  Toileting  Assisted  Feeding  Assisted  Med Admin  Assisted  Med Delivery   whole    Wound Care Documentation and Therapy:        Elimination:  Continence:   · Bowel: No  · Bladder: No  Urinary Catheter: Insertion Date: Suprapubic present prior to admission   Colostomy/Ileostomy/Ileal Conduit: No       Date of Last BM: 5/24/19    Intake/Output Summary (Last 24 hours) at 5/25/2019 0849  Last data filed at 5/25/2019 0513  Gross per 24 hour   Intake 1710 ml   Output 700 ml   Net 1010 ml     I/O last 3 completed shifts: In: 1710 [I.V.:1510; IV Piggyback:200]  Out: 700 [Urine:700]    Safety Concerns: At Risk for Falls    Impairments/Disabilities:      Speech    Nutrition Therapy:  Current Nutrition Therapy:   - Oral Diet:  General    Routes of Feeding: Oral  Liquids: No Restrictions  Daily Fluid Restriction: no  Last Modified Barium Swallow with Video (Video Swallowing Test): not done    Treatments at the Time of Hospital Discharge:   Respiratory Treatments: None  Oxygen Therapy:  is not on home oxygen therapy.   Ventilator:    - No ventilator support    Rehab Therapies: Physical Therapy  Weight Bearing Status/Restrictions: No weight bearing restirctions  Other Medical Equipment (for information only, NOT a DME order):  None  Other Treatments: ***    Patient's personal belongings (please select all that are sent with patient):  None    RN SIGNATURE:  Electronically signed by Damien Hummel RN on 5/25/19 at 8:51 AM    CASE MANAGEMENT/SOCIAL WORK SECTION    Inpatient Status Date: 5/24/19    Readmission Risk Assessment Score:  Readmission Risk              Risk of Unplanned Readmission:        12           Discharging to Facility/ Agency   · Name: [facility]  · Address:  · [phone]  · Fax:    Dialysis Facility (if applicable)   · Name:  · Address:  · Dialysis Schedule:  · Phone:  · Fax:    / signature:Electronically signed by Sirisha Villagomez on 5/29/2019 at 1:48 PM     PHYSICIAN SECTION    Prognosis: Guarded    Condition at Discharge: Stable    Rehab Potential (if transferring to Rehab):     Recommended Labs or Other Treatments After Discharge:   - Check Potassium and Magnesium levels daily and adjust replacement supplements as needed until stable dose established    Physician Certification: I certify the above information and transfer of Abby Ortega  is necessary for the continuing treatment of the diagnosis listed and that he requires [facility] for less 30 days.      Update Admission H&P: No change in H&P    PHYSICIAN SIGNATURE:  Electronically signed by Katiuska Iglesias MD on 5/29/19 at 10:52 AM

## 2019-05-25 NOTE — PROGRESS NOTES
Physical Therapy    Facility/Department: [facility]  Initial Assessment    NAME: Gurdeep Mobley  : 1950  MRN: [id number]    Date of Service: 2019     -as of this session anticipate need for continued PT OT and nursing care in skilled setting prior to home with family assist and home PT    Discharge Recommendations:  Gurdeep Mobley scored a 10/24 on the AM-PAC short mobility form. Current research shows that an AM-PAC score of 17 or less is typically not associated with a discharge to the patient's home setting. Based on the patients AM-PAC score and their current functional mobility deficits, it is recommended that the patient have 3-5 sessions per week of Physical Therapy at d/c to increase the patients independence. Assessment   Body structures, Functions, Activity limitations: Decreased functional mobility ; Decreased endurance;Decreased ADL status; Decreased safe awareness;Decreased balance;Decreased cognition  Prognosis: Good;Fair  Decision Making: Medium Complexity  REQUIRES PT FOLLOW UP: Yes  Activity Tolerance  Activity Tolerance: Patient limited by fatigue(limited by low blood pressure)       Patient Diagnosis(es): The primary encounter diagnosis was Hepatic encephalopathy ([address] Diagnoses of Fall, initial encounter and Hygroma were also pertinent to this visit. has a past medical history of Abnormality of gait, Alcohol dependence ([address] Alcoholic cirrhosis ([address] Anxiety, Arthritis, Basal cell carcinoma, Cerebral artery occlusion with cerebral infarction ([address] Circulation problem, Dementia, Depressive disorder, GERD (gastroesophageal reflux disease), Hypomagnesemia, Hypopotassemia, SOB (shortness of breath), Spinal stenosis, Suicidal behavior, Syncope, Thrombocytopenia ([address] Traumatic brain injury ([address] Wears dentures, Wears glasses, and Wheezing. has a past surgical history that includes Upper gastrointestinal endoscopy; Colonoscopy;  Upper gastrointestinal endoscopy (01/04/2018); and Colonoscopy (08/22/2018). Restrictions  Restrictions/Precautions  Restrictions/Precautions: Fall Risk  Vision/Hearing  Vision: Within Functional Limits(not assessed formally)  Hearing: Within functional limits     Subjective  General  Chart Reviewed: Yes  Patient assessed for rehabilitation services?: Yes  Additional Pertinent Hx: here due to multiple falls at home - PMH is extensive including alcoholism       Response To Previous Treatment: Not applicable  Family / Caregiver Present: Yes(his sister)  Follows Commands: (slowly - needs time to process)  Subjective  Subjective: awake and oriented to self and place- off on time -agreeable to PT OT assessments and Intake questions (his sister present to assist with intake as she is able)  Pain Screening  Patient Currently in Pain: Denies(Simultaneous filing. User may not have seen previous data.)  Social/Functional History  Social/Functional History  Lives With: Spouse  Type of Home: House  Home Layout: One level  Home Access: Stairs to enter with rails  Entrance Stairs - Number of Steps: 8-10 Flight of steps enterring through basement  Entrance Stairs - Rails: Left  Bathroom Shower/Tub: Walk-in shower  Bathroom Toilet: Handicap height  Bathroom Equipment: Shower chair(with back)  Home Equipment: Rolling walker, Cane(does not use the walker unless \"Going downstairs\")  ADL Assistance: Needs assistance(Assist for bathing from wife)  Homemaking Responsibilities: No  Ambulation Assistance: Needs assistance(wife assisted)  Transfer Assistance: Needs assistance  Active : No(Pt reports he drives but sister reports he does not anymore)  Occupation: Retired  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 Pt is not a relieable historian according to his sister who is present.      Objective  ROM and strength grossly functional   Motor Control  Gross Motor?:

## 2019-05-25 NOTE — PROGRESS NOTES
Hospitalist Progress Note      PCP: Lois Kee MD    Date of Admission: 5/24/2019        Subjective: feels ok, no chest pain, nausea, vomiting, abdominal pain or fever, no family member at bedside. Medications:  Reviewed    Infusion Medications    sodium chloride 75 mL/hr (05/25/19 0513)     Scheduled Medications    potassium chloride  40 mEq Oral BID    lactulose  30 g Oral TID    rifaximin  550 mg Oral BID    sodium chloride flush  10 mL Intravenous 2 times per day    famotidine  20 mg Oral BID    enoxaparin  40 mg Subcutaneous Nightly    piperacillin-tazobactam  3.375 g Intravenous Q8H    torsemide  20 mg Oral Daily    tamsulosin  0.4 mg Oral Nightly    nadolol  20 mg Oral Daily    miconazole   Topical BID    DULoxetine  30 mg Oral Daily    amiodarone  200 mg Oral Daily    mupirocin   Topical Daily     PRN Meds: sodium chloride flush, magnesium hydroxide, ondansetron, acetaminophen      Intake/Output Summary (Last 24 hours) at 5/25/2019 0823  Last data filed at 5/25/2019 0513  Gross per 24 hour   Intake 1710 ml   Output 700 ml   Net 1010 ml       Physical Exam Performed:    BP (!) 92/57   Pulse 58   Temp 98.3 °F (36.8 °C) (Oral)   Resp 18   Ht 6' (1.829 m)   Wt 149 lb 4 oz (67.7 kg)   SpO2 94%   BMI 20.24 kg/m²     General appearance: No apparent distress  Neck: Supple  Respiratory:  Normal respiratory effort. Clear to auscultation, bilaterally without Rales/Wheezes/Rhonchi. Cardiovascular: Regular rate and rhythm with normal S1/S2 without murmurs, rubs or gallops. Abdomen: Soft, non-tender, non-distended   Musculoskeletal: No clubbing  Skin: Skin color, texture, turgor normal.  No rashes or lesions.   Neurologic:  Moving all extremities spontaneously   Psychiatric: Awake   Capillary Refill: Brisk,< 3 seconds   Peripheral Pulses: +2 palpable, equal bilaterally       Labs:   Recent Labs     05/24/19  1233 05/25/19  0514   WBC 4.6 4.7   HGB 12.4* 11.5*   HCT 36.5* 33.9*   PLT 89* 78*     Recent Labs     05/24/19  1233 05/25/19  0513    142   K 3.0* 3.0*    104   CO2 29 25   BUN 10 12   CREATININE 1.3 1.1   CALCIUM 9.1 8.5     Recent Labs     05/24/19  1233   AST 32   ALT 14   BILITOT 2.8*   ALKPHOS 129     Recent Labs     05/24/19  1233   INR 1.46*     Recent Labs     05/24/19  1233   TROPONINI <0.01       Urinalysis:      Lab Results   Component Value Date    NITRU Negative 05/24/2019    WBCUA 28 05/24/2019    BACTERIA 1+ 05/24/2019    RBCUA 22 05/24/2019    BLOODU MODERATE 05/24/2019    SPECGRAV 1.008 05/24/2019    GLUCOSEU Negative 05/24/2019    GLUCOSEU NEGATIVE 06/27/2011       Radiology:  CT Head WO Contrast   Final Result   1. Moderate bilateral extra-axial spaces likely due to hygromas. XR PELVIS (1-2 VIEWS)   Final Result   No acute finding in the pelvis. Degenerative changes slightly progressed in   the bilateral hips. XR CHEST PORTABLE   Final Result   No acute findings in the chest.                 Assessment/Plan:    Active Hospital Problems    Diagnosis    Hepatic encephalopathy ([address] [K72.90]    Hyperammonemia ([address] [E72.20]    Thrombocytopenia ([address] [D69.6]    Hypokalemia [E87.6]    Hypomagnesemia [E83.42]    Hyperbilirubinemia [E80.6]    Urinary tract infection associated with cystostomy catheter ([address] [T83.510A, N39.0]    Generalized weakness [R53.1]    Paroxysmal atrial fibrillation (HCC) [U26.7]    Alcoholic cirrhosis ([address] [D10.94]    Dementia [F03.90]     1. Hepatic encephalopathy in the setting of alcoholic cirrhosis and, hyperammonemia, started Lactulose and Rifaximin. Consulted GI  2. Hyperbilirubinemia, appears chronic. GI consulted   3. Thrombocytopenia, likel secondary to splenomegaly in the setting of liver cirrhosis. 4. Urinary tract infection associated with cystostomy catheter ([address] was started on Zosyn empirically. Urine culture and sensitivities have been ordered.   5. Hypokalemia, will replace again this am.   6. Generalized weakness with multiple falls, PT/OT to evaluate and treat patient. 7. Paroxysmal atrial fibrillation ([facility] currently rate controlled; continue Amiodarone and Nadolol  8. Dementia, confusion worse than baseline, monitor closely.          Diet: DIET GENERAL;  Code Status: Full Code        Dispo - ongoing management     Guera Ingram MD

## 2019-05-25 NOTE — PROGRESS NOTES
Occupational Therapy   Occupational Therapy Initial Assessment   If pt is d/c'd prior to next tx session this note will serve as d/c summary. Date: 2019   Patient Name: Miguel Saldivar  MRN: [id number]     : 1950    Date of Service: 2019     -as of this session anticipate need for continued PT OT and nursing care in skilled setting prior to home with family assist and home OT      Discharge Recommendations:    Miguel Saldivar scored a 14/24 on the AM-PAC ADL Inpatient form. Current research shows that an AM-PAC score of 17 or less is typically not associated with a discharge to the patient's home setting. Based on the patients AM-PAC score and their current ADL deficits, it is recommended that the patient have 3-5 sessions per week of Occupational Therapy at d/c to increase the patients independence. Assessment   Performance deficits / Impairments: Decreased functional mobility ; Decreased endurance;Decreased ADL status; Decreased safe awareness;Decreased cognition;Decreased balance      Assessment:  81-year-old man with a history of paroxysmal atrial fibrillation, alcoholic liver cirrhosis, dementia and a cystostomy catheter in place, with UTI and high ammonia levels is now functioning well below baseline. He is exhibiting  orthostatic hypotension and has a recent history of frequent falls at home. He requires Min assist for sit to and from stand but is unable to take steps due to low BP and  requires mod/min assist for bed mobility. He would benefit from continued OT services to increase his functional  status in ADLs and mobility.      Treatment Diagnosis: Imp ADLs, functional mobility and transfers, decreased safety and balance   Prognosis: Good  Decision Making: Medium Complexity  History: paroxysmal atrial fibrillation, alcoholic liver cirrhosis, dementia and a cystostomy catheter in place  Exam: Bed mobility, sit to and from stand, BUE ROM and strength and cognition  Assistance / Modification:  Min assist for sit to and from stand but is unable to take steps due to low BP and  requires mod/min assist for bed mobility. Patient Education: Role of OT, POC, hand positioning for sit to and from stand  Barriers to Learning: Cognition and orthostaice hypostension  REQUIRES OT FOLLOW UP: Yes  Activity Tolerance  Activity Tolerance: Patient limited by fatigue;Treatment limited secondary to medical complications (free text)  Activity Tolerance: BP  87/48 after sitting and c/o dizziness during stance  Safety Devices  Safety Devices in place: Yes  Type of devices: Bed alarm in place;Call light within reach; Left in bed;Nurse notified(RN Chadd Rocha notified and sister present)           Patient Diagnosis(es): The primary encounter diagnosis was Hepatic encephalopathy ([address] Diagnoses of Fall, initial encounter and Hygroma were also pertinent to this visit. has a past medical history of Abnormality of gait, Alcohol dependence ([address] Alcoholic cirrhosis ([address] Anxiety, Arthritis, Basal cell carcinoma, Cerebral artery occlusion with cerebral infarction ([address] Circulation problem, Dementia, Depressive disorder, GERD (gastroesophageal reflux disease), Hypomagnesemia, Hypopotassemia, SOB (shortness of breath), Spinal stenosis, Suicidal behavior, Syncope, Thrombocytopenia ([address] Traumatic brain injury ([address] Wears dentures, Wears glasses, and Wheezing. has a past surgical history that includes Upper gastrointestinal endoscopy; Colonoscopy; Upper gastrointestinal endoscopy (01/04/2018); and Colonoscopy (08/22/2018).     Treatment Diagnosis: Imp ADLs, functional mobility and transfers, decreased safety and balance       Restrictions  Restrictions/Precautions  Restrictions/Precautions: Fall Risk    Subjective   General  Chart Reviewed: Yes  Patient assessed for rehabilitation services?: Yes  Additional Pertinent Hx: Patient is a 64-year-old man with a history of paroxysmal atrial fibrillation, alcoholic liver cirrhosis, dementia and a cystostomy catheter in place who was brought to the emergency room by his wife for evaluation of worsening infusion and weakness. She states that he has fallen three times since last night. In the emergency room he was found to have both a urinary tract infection and an elevated ammonia level. He has no prior history of hepatic encephalopathy  Family / Caregiver Present: No(sister present and acts as historian)  Referring Practitioner: Blas  Diagnosis: Hepatic enceph and cirrhosis from ETOH  Subjective  Subjective: \"I can still drive\" Pt is poor historian, per sister reports  General Comment  Comments: Pt agreeable to participate in evaluation. Unsage to transfer due to orthostatice BP     Social/Functional History  Social/Functional History  Lives With: Spouse  Type of Home: House  Home Layout: One level  Home Access: Stairs to enter with rails  Entrance Stairs - Number of Steps: 8-10 Flight of steps enterring through basement  Entrance Stairs - Rails: Left  Bathroom Shower/Tub: Walk-in shower  Bathroom Toilet: Handicap height  Bathroom Equipment: Shower chair(with back)  Home Equipment: Rolling walker, Cane(does not use the walker unless \"Going downstairs\")  ADL Assistance: Needs assistance(Assist for bathing from wife)  Homemaking Responsibilities: No  Ambulation Assistance: Needs assistance(wife assisted)  Transfer Assistance: Needs assistance  Active : No(Pt reports he drives but sister reports he does not anymore)  Occupation: Retired  Type of occupation: Real estate atttorney  Leisure & Hobbies: Watch tv  Additional Comments: Wife is home \"most of the time\". Pt is not a relieable historian according to his sister who is present.         Objective        Orientation  Overall Orientation Status: Impaired  Orientation Level: Oriented to person;Disoriented to time;Disoriented to place(reports place as \"[facility]\" and month as \"June\") Inpatient ADL T-Scale Score : 33.39  ADL Inpatient CMS 0-100% Score: 59.67  ADL Inpatient CMS G-Code Modifier : CK    Goals  Short term goals  Time Frame for Short term goals: date of d/c  Short term goal 1: SBA UE ADLs  Short term goal 2: Min/mod assist LE ADLs  Short term goal 3: Min assist bed mobility  Short term goal 4: Min assist funcitonal transfers to bed, chair and toilet with safe RW technique  Short term goal 5: Min assist functional mobility at RW level with 1-2 verbal cues for walker safety  Long term goals  Time Frame for Long term goals : TBD  Patient Goals   Patient goals : None formulated at this session       Therapy Time   Individual Concurrent Group Co-treatment   Time In 1045         Time Out 1120         Minutes 35         Timed Code Treatment Minutes: 20 Minutes     Please transfer OT care to primary OT.   OT license: [id number]  Debbie Mchugh OT   Electronically signed by Debbie Mchugh OT on 5/25/2019 at 11:52 AM

## 2019-05-25 NOTE — PROGRESS NOTES
Pt A&O x2-3, does not follow commads, unable to make needs known, call light in reach. Fall risk assessment completed . Fall precautions in place, bed/ chair alarm on, side rails 2/4 up, call light in reach, educated pt on calling for assistance when needed, room clear of clutter. Pain /discomfort being managed with PRN analgesics per MD orders. Patient able to express presence and absence of pain and rate pain appropriately using numerical scale. Vitals signs are stable.   Intake and output are being recorded, will continue to monitor

## 2019-05-25 NOTE — PROGRESS NOTES
[facility]:    Consulted to see patient for hepatic encephalopathy and cirrhosis from EtOH. Pt previously seen by Dr. Fannie Kumar, who is in a different GI group than me. Dr. Fannie Kumar is in [facility]. Please consult [facility] to maintain continuity of care.      Alberto Mendez MD  [address]  [phone]

## 2019-05-25 NOTE — CONSULTS
[address]                                  CONSULTATION    PATIENT NAME: Dustin Singh                  :        1950  MED REC NO:   [id number]                          ROOM:       3113  ACCOUNT NO:   [de-identified]                           ADMIT DATE: 2019  PROVIDER:     Kyree Caballero MD    CONSULT DATE:  2019    REFERRING PHYSICIANS:  Daniella Khalil MD and Bang Cool MD    HISTORY OF PRESENT ILLNESS:  The patient is a 44-year-old male with  known alcoholic cirrhosis who is followed by Dr. Mamie Taylor. He has performed  both a colonoscopy and EGD within the past one year. His colonoscopy  demonstrated hemorrhoids. His EGG revealed grade 1 to 2 esophageal  varices and portal hypertensive gastropathy. The patient was admitted  here because of confusion and weakness. He had fallen several times at  home. He was found to have an elevated ammonia level. The patient was  also found to have a urinary tract infection. He is growing out greater  than 100,000 gram-negative rods from the urine. The ammonia level was  105 and then dusty to 125. The patient was started on lactulose. He  apparently has had no previous history of hepatic encephalopathy but  does have a history of underlying dementia. He does have a suprapubic  catheter in place. When I spoke with the patient, he was confused and  not oriented as to time. He did have asterixis. No other information  could be obtained from him. He did, however, state that he has not had  anything to drink since the end of last year and a blood ethanol level  was not detectable. His urine tox screen was also negative. PAST MEDICAL HISTORY:  This is remarkable for a previous remote CVA,  dementia, depression, anxiety, and the above-mentioned alcoholic  cirrhosis. ALLERGIES:  No allergies to medications were listed.     MEDICATIONS: Please refer to the admission history and physical exam  for the patient's current outpatient drug therapy. FAMILY HISTORY:  Not obtained. SOCIAL HISTORY:  The patient is . He does not use tobacco or  apparently any further ethanol. PHYSICAL EXAMINATION:  VITAL SIGNS:  Blood pressure 92/56, pulse 58, temperature 98.3 degrees  orally. GENERAL:  The patient appeared as an alert and cooperative but confused  elderly white male. HEENT:  Sclerae: Icteric. ABDOMEN:  Soft, without palpable masses or organomegaly. I could detect  no significant ascites. A suprapubic catheter was in place and the  suprapubic area was tender to palpation. EXTREMITIES:  There was no edema. NEUROLOGICAL:  There was definite asterixis. OTHER LABORATORY STUDIES:  The serum potassium was 3.0. The albumin was  2.8 and total bilirubin 2.8. The white blood cell count was 4700. The  hemoglobin was 11.5 with an elevated MCV. The platelet count was  19,964. IMPRESSION:  A 27-year-old male with new-onset hepatic encephalopathy  which has likely been precipitated by a urinary tract infection. The  patient is confused and the serum ammonia level is elevated and he does  have asterixis on exam.  A full assessment of his mental status is  somewhat difficult due to underlying dementia. His most significant  problem is alcoholic cirrhosis. PLAN:  I would continue the lactulose. Xifaxan can be ordered if  diarrhea should occur. Antibiotics have been ordered for the UTI. I  would expect that the encephalopathy should clear as the infection is  treated.         Ammie Castleman, MD    D: 05/25/2019 13:10:01       T: 05/25/2019 17:41:53     [id number]  Job#: [id number]     Doc#: [id number]    CC:  Jen Ritchie MD       [facility], MD Jeremias Mcduffie MD

## 2019-05-26 LAB
A/G RATIO: 0.6 (ref 1.1–2.2)
ALBUMIN SERPL-MCNC: 2.4 G/DL (ref 3.4–5)
ALP BLD-CCNC: 83 U/L (ref 40–129)
ALT SERPL-CCNC: 12 U/L (ref 10–40)
AMMONIA: 72 UMOL/L (ref 16–60)
ANION GAP SERPL CALCULATED.3IONS-SCNC: 11 MMOL/L (ref 3–16)
AST SERPL-CCNC: 27 U/L (ref 15–37)
BASOPHILS ABSOLUTE: 0.1 K/UL (ref 0–0.2)
BASOPHILS RELATIVE PERCENT: 1.3 %
BILIRUB SERPL-MCNC: 2.5 MG/DL (ref 0–1)
BUN BLDV-MCNC: 12 MG/DL (ref 7–20)
CALCIUM SERPL-MCNC: 8.7 MG/DL (ref 8.3–10.6)
CHLORIDE BLD-SCNC: 104 MMOL/L (ref 99–110)
CO2: 25 MMOL/L (ref 21–32)
CREAT SERPL-MCNC: 1.3 MG/DL (ref 0.8–1.3)
EOSINOPHILS ABSOLUTE: 0.1 K/UL (ref 0–0.6)
EOSINOPHILS RELATIVE PERCENT: 2.4 %
GFR AFRICAN AMERICAN: >60
GFR NON-AFRICAN AMERICAN: 55
GLOBULIN: 3.9 G/DL
GLUCOSE BLD-MCNC: 114 MG/DL (ref 70–99)
HCT VFR BLD CALC: 32.3 % (ref 40.5–52.5)
HEMOGLOBIN: 11 G/DL (ref 13.5–17.5)
LYMPHOCYTES ABSOLUTE: 1.5 K/UL (ref 1–5.1)
LYMPHOCYTES RELATIVE PERCENT: 36 %
MCH RBC QN AUTO: 36.6 PG (ref 26–34)
MCHC RBC AUTO-ENTMCNC: 34 G/DL (ref 31–36)
MCV RBC AUTO: 107.5 FL (ref 80–100)
MONOCYTES ABSOLUTE: 0.6 K/UL (ref 0–1.3)
MONOCYTES RELATIVE PERCENT: 14.2 %
NEUTROPHILS ABSOLUTE: 1.9 K/UL (ref 1.7–7.7)
NEUTROPHILS RELATIVE PERCENT: 46.1 %
PDW BLD-RTO: 16.2 % (ref 12.4–15.4)
PLATELET # BLD: 75 K/UL (ref 135–450)
PMV BLD AUTO: 10 FL (ref 5–10.5)
POTASSIUM SERPL-SCNC: 3.1 MMOL/L (ref 3.5–5.1)
RBC # BLD: 3.01 M/UL (ref 4.2–5.9)
SODIUM BLD-SCNC: 140 MMOL/L (ref 136–145)
TOTAL PROTEIN: 6.3 G/DL (ref 6.4–8.2)
WBC # BLD: 4 K/UL (ref 4–11)

## 2019-05-26 PROCEDURE — 1200000000 HC SEMI PRIVATE

## 2019-05-26 PROCEDURE — 6370000000 HC RX 637 (ALT 250 FOR IP): Performed by: INTERNAL MEDICINE

## 2019-05-26 PROCEDURE — 80053 COMPREHEN METABOLIC PANEL: CPT

## 2019-05-26 PROCEDURE — 85025 COMPLETE CBC W/AUTO DIFF WBC: CPT

## 2019-05-26 PROCEDURE — 2580000003 HC RX 258: Performed by: INTERNAL MEDICINE

## 2019-05-26 PROCEDURE — 6360000002 HC RX W HCPCS: Performed by: INTERNAL MEDICINE

## 2019-05-26 PROCEDURE — 82140 ASSAY OF AMMONIA: CPT

## 2019-05-26 PROCEDURE — 36415 COLL VENOUS BLD VENIPUNCTURE: CPT

## 2019-05-26 RX ORDER — POTASSIUM CHLORIDE 7.45 MG/ML
10 INJECTION INTRAVENOUS
Status: COMPLETED | OUTPATIENT
Start: 2019-05-26 | End: 2019-05-26

## 2019-05-26 RX ORDER — POTASSIUM CHLORIDE 1.5 G/1.77G
40 POWDER, FOR SOLUTION ORAL ONCE
Status: COMPLETED | OUTPATIENT
Start: 2019-05-26 | End: 2019-05-26

## 2019-05-26 RX ADMIN — PIPERACILLIN SODIUM,TAZOBACTAM SODIUM 3.38 G: 3; .375 INJECTION, POWDER, FOR SOLUTION INTRAVENOUS at 02:41

## 2019-05-26 RX ADMIN — MICONAZOLE NITRATE: 20 POWDER TOPICAL at 21:17

## 2019-05-26 RX ADMIN — Medication 10 ML: at 21:08

## 2019-05-26 RX ADMIN — PIPERACILLIN SODIUM,TAZOBACTAM SODIUM 3.38 G: 3; .375 INJECTION, POWDER, FOR SOLUTION INTRAVENOUS at 09:15

## 2019-05-26 RX ADMIN — FAMOTIDINE 20 MG: 20 TABLET ORAL at 21:04

## 2019-05-26 RX ADMIN — FAMOTIDINE 20 MG: 20 TABLET ORAL at 09:15

## 2019-05-26 RX ADMIN — MUPIROCIN: 2 CREAM TOPICAL at 09:17

## 2019-05-26 RX ADMIN — LACTULOSE 30 G: 20 SOLUTION ORAL at 21:04

## 2019-05-26 RX ADMIN — PIPERACILLIN SODIUM,TAZOBACTAM SODIUM 3.38 G: 3; .375 INJECTION, POWDER, FOR SOLUTION INTRAVENOUS at 17:56

## 2019-05-26 RX ADMIN — TAMSULOSIN HYDROCHLORIDE 0.4 MG: 0.4 CAPSULE ORAL at 21:04

## 2019-05-26 RX ADMIN — LACTULOSE 30 G: 20 SOLUTION ORAL at 09:15

## 2019-05-26 RX ADMIN — Medication 10 MEQ: at 06:34

## 2019-05-26 RX ADMIN — Medication 10 MEQ: at 08:55

## 2019-05-26 RX ADMIN — RIFAXIMIN 550 MG: 550 TABLET ORAL at 09:16

## 2019-05-26 RX ADMIN — ENOXAPARIN SODIUM 40 MG: 40 INJECTION SUBCUTANEOUS at 21:04

## 2019-05-26 RX ADMIN — Medication 10 MEQ: at 07:35

## 2019-05-26 RX ADMIN — TORSEMIDE 20 MG: 20 TABLET ORAL at 09:15

## 2019-05-26 RX ADMIN — LACTULOSE 30 G: 20 SOLUTION ORAL at 15:15

## 2019-05-26 RX ADMIN — POTASSIUM CHLORIDE 40 MEQ: 1.5 POWDER, FOR SOLUTION ORAL at 10:23

## 2019-05-26 RX ADMIN — RIFAXIMIN 550 MG: 550 TABLET ORAL at 21:04

## 2019-05-26 RX ADMIN — MICONAZOLE NITRATE: 20 POWDER TOPICAL at 09:16

## 2019-05-26 ASSESSMENT — PAIN SCALES - GENERAL: PAINLEVEL_OUTOF10: 0

## 2019-05-26 NOTE — PROGRESS NOTES
Pt A&O x4 able to make needs known, intermittent confusion noted this this, pt pale and lethargic, with x3 very large and loose BM noted. Also noted with x1 emesis after dinner. MD Dick updated.

## 2019-05-26 NOTE — PROGRESS NOTES
culture with gram negative renato, follow up on final result and sensitivity when available. 5. Hypokalemia, will replace again this am.   6. Generalized weakness with multiple falls, PT/OT to evaluate and treat patient. 7. Paroxysmal atrial fibrillation ([address] currently rate controlled; continue Amiodarone and Nadolol  8. Dementia, confusion worse than baseline, monitor closely. 9. Borderline hypotension, expected with liver disease, does not look septic at this time, will monitor closely. Discussed [facility] nurse to check frequently and manually.          Diet: DIET GENERAL;  Code Status: Full Code      Dispo - ongoing management     Lc Kelly MD

## 2019-05-26 NOTE — PROGRESS NOTES
Nadia Collado   BP borderline low  Mental status somewhat improved - discussed with the patient's wife  Ammonia down to 67    REC:  Continue current treatment  The hepatic encephalopathy is slowly clearing  There is underlying dementia

## 2019-05-26 NOTE — PROGRESS NOTES
Pt noted lethargic and hypotensive. MD notified of these findings with orders for K run and to d/c cymbalta.  Pt made aware and verbalized understanding

## 2019-05-26 NOTE — PLAN OF CARE
Problem: Falls - Risk of:  Goal: Will remain free from falls  Description  Will remain free from falls  5/26/2019 0744 by Mikayla Stephen RN  Outcome: Ongoing  Note:   Pt will remain free from falls. Fall risk assessment completed. Fall precautions in place. Bed in lowest position, wheels locked, bed/chair exit alarm in place, call light within reach, and non skid footwear on. Walkway free of clutter. Pt alert and oriented and able to make needs known. Pt educated to use call light when needing to get up, and pt utilizes call light to make needs known. Will continue to monitor. Problem: Falls - Risk of:  Goal: Absence of physical injury  Description  Absence of physical injury  5/26/2019 0744 by Mikayla Stephen RN  Outcome: Ongoing  Note:   Pt absent from physical injury on this shift      Problem: Skin Integrity:  Goal: Will show no infection signs and symptoms  Description  Will show no infection signs and symptoms  5/26/2019 0744 by Mikayla Stephen RN  Outcome: Ongoing  Note:   Pt will show no signs of infection      Problem: Skin Integrity:  Goal: Absence of new skin breakdown  Description  Absence of new skin breakdown  5/26/2019 0744 by Mikayla Stephen RN  Outcome: Ongoing  Note:   Pt absent from new skin breakdown      Problem: Musculor/Skeletal Functional Status  Goal: Highest potential functional level  5/26/2019 0744 by Mikayla Stephen RN  Outcome: Ongoing  Note:   Muscular/skeletal functional status remains ongoing      Problem: Musculor/Skeletal Functional Status  Goal: Absence of falls  5/26/2019 0744 by Mikayla Stephen RN  Outcome: Ongoing  Note:   Pt absent from falls on this shift      Problem: Risk for Impaired Skin Integrity  Goal: Tissue integrity - skin and mucous membranes  Description  Structural intactness and normal physiological function of skin and  mucous membranes.   5/26/2019 0744 by Mikayla Stephen RN  Outcome: Ongoing  Note:   Skin being assessed during this shift.  Julio protocol in place. Feet being elevated. Encouraging pt to turn every 2 hours. No signs of new skin breakdown. Will continue to monitor and reassess.

## 2019-05-26 NOTE — PROGRESS NOTES
Patient is alert & oriented x4, x2 with stedy, 2/4 bed rails up, bed in lowest position, fall precautions in place, call light within reach. New IV replaced. Morning medications. Morning assessment complete. Will cont to monitor and reassess.   Electronically signed by Mallorie Hitchcock RN on 5/26/2019 at 9:57 AM

## 2019-05-26 NOTE — PLAN OF CARE
Pt assessed for fall risk and fall precautions put into place. Bed in lowest position and wheels locked, call light within reach. Nonskid footwear in place. Patient educated on appropriate method of transfer and to call for assistance. Patient remains free from intentional harm, no signs or symptoms of intention to harm noted. Will continue to monitor patient throughout shift. Pt able to communicate and speak during physical activity. Will continue to monitor patient to ensure communication is possible throughout activity; will decrease activity level if goal is not being met.    \

## 2019-05-27 LAB
AMMONIA: 72 UMOL/L (ref 16–60)
ANISOCYTOSIS: ABNORMAL
BANDED NEUTROPHILS RELATIVE PERCENT: 1 % (ref 0–7)
BASOPHILS ABSOLUTE: 0 K/UL (ref 0–0.2)
BASOPHILS RELATIVE PERCENT: 1 %
EOSINOPHILS ABSOLUTE: 0.1 K/UL (ref 0–0.6)
EOSINOPHILS RELATIVE PERCENT: 3 %
HCT VFR BLD CALC: 30 % (ref 40.5–52.5)
HEMOGLOBIN: 10.3 G/DL (ref 13.5–17.5)
LYMPHOCYTES ABSOLUTE: 1.5 K/UL (ref 1–5.1)
LYMPHOCYTES RELATIVE PERCENT: 34 %
MACROCYTES: ABNORMAL
MCH RBC QN AUTO: 36.8 PG (ref 26–34)
MCHC RBC AUTO-ENTMCNC: 34.3 G/DL (ref 31–36)
MCV RBC AUTO: 107.3 FL (ref 80–100)
MONOCYTES ABSOLUTE: 0.5 K/UL (ref 0–1.3)
MONOCYTES RELATIVE PERCENT: 11 %
NEUTROPHILS ABSOLUTE: 2.2 K/UL (ref 1.7–7.7)
NEUTROPHILS RELATIVE PERCENT: 50 %
ORGANISM: ABNORMAL
ORGANISM: ABNORMAL
PDW BLD-RTO: 16.4 % (ref 12.4–15.4)
PLATELET # BLD: 66 K/UL (ref 135–450)
PLATELET SLIDE REVIEW: ABNORMAL
PMV BLD AUTO: 10.1 FL (ref 5–10.5)
RBC # BLD: 2.8 M/UL (ref 4.2–5.9)
SLIDE REVIEW: ABNORMAL
URINE CULTURE, ROUTINE: ABNORMAL
WBC # BLD: 4.3 K/UL (ref 4–11)

## 2019-05-27 PROCEDURE — 6360000002 HC RX W HCPCS: Performed by: INTERNAL MEDICINE

## 2019-05-27 PROCEDURE — 1200000000 HC SEMI PRIVATE

## 2019-05-27 PROCEDURE — 82140 ASSAY OF AMMONIA: CPT

## 2019-05-27 PROCEDURE — 94760 N-INVAS EAR/PLS OXIMETRY 1: CPT

## 2019-05-27 PROCEDURE — 85025 COMPLETE CBC W/AUTO DIFF WBC: CPT

## 2019-05-27 PROCEDURE — 36415 COLL VENOUS BLD VENIPUNCTURE: CPT

## 2019-05-27 PROCEDURE — 6370000000 HC RX 637 (ALT 250 FOR IP): Performed by: INTERNAL MEDICINE

## 2019-05-27 PROCEDURE — 2580000003 HC RX 258: Performed by: INTERNAL MEDICINE

## 2019-05-27 RX ADMIN — LACTULOSE 30 G: 20 SOLUTION ORAL at 20:40

## 2019-05-27 RX ADMIN — RIFAXIMIN 550 MG: 550 TABLET ORAL at 20:40

## 2019-05-27 RX ADMIN — LACTULOSE 30 G: 20 SOLUTION ORAL at 14:46

## 2019-05-27 RX ADMIN — MEROPENEM 500 MG: 500 INJECTION, POWDER, FOR SOLUTION INTRAVENOUS at 20:40

## 2019-05-27 RX ADMIN — LACTULOSE 30 G: 20 SOLUTION ORAL at 09:18

## 2019-05-27 RX ADMIN — TAMSULOSIN HYDROCHLORIDE 0.4 MG: 0.4 CAPSULE ORAL at 20:40

## 2019-05-27 RX ADMIN — ENOXAPARIN SODIUM 40 MG: 40 INJECTION SUBCUTANEOUS at 20:40

## 2019-05-27 RX ADMIN — FAMOTIDINE 20 MG: 20 TABLET ORAL at 09:18

## 2019-05-27 RX ADMIN — PIPERACILLIN SODIUM,TAZOBACTAM SODIUM 3.38 G: 3; .375 INJECTION, POWDER, FOR SOLUTION INTRAVENOUS at 01:48

## 2019-05-27 RX ADMIN — MUPIROCIN: 2 CREAM TOPICAL at 09:19

## 2019-05-27 RX ADMIN — PIPERACILLIN SODIUM,TAZOBACTAM SODIUM 3.38 G: 3; .375 INJECTION, POWDER, FOR SOLUTION INTRAVENOUS at 09:18

## 2019-05-27 RX ADMIN — TORSEMIDE 20 MG: 20 TABLET ORAL at 09:18

## 2019-05-27 RX ADMIN — RIFAXIMIN 550 MG: 550 TABLET ORAL at 09:18

## 2019-05-27 RX ADMIN — FAMOTIDINE 20 MG: 20 TABLET ORAL at 20:40

## 2019-05-27 RX ADMIN — Medication 10 ML: at 09:19

## 2019-05-27 RX ADMIN — MICONAZOLE NITRATE: 20 POWDER TOPICAL at 09:18

## 2019-05-27 RX ADMIN — MEROPENEM 500 MG: 500 INJECTION, POWDER, FOR SOLUTION INTRAVENOUS at 14:46

## 2019-05-27 RX ADMIN — MICONAZOLE NITRATE: 20 POWDER TOPICAL at 22:08

## 2019-05-27 RX ADMIN — Medication 10 ML: at 20:42

## 2019-05-27 NOTE — PLAN OF CARE
Problem: Falls - Risk of:  Goal: Will remain free from falls  Description  Will remain free from falls  Outcome: Ongoing  Note:   Pt will remain free from falls. Fall risk assessment completed. Fall precautions in place. Bed in lowest position, wheels locked, bed/chair exit alarm in place, call light within reach, and non skid footwear on. Walkway free of clutter. Pt alert and oriented and able to make needs known. Pt educated to use call light when needing to get up, and pt utilizes call light to make needs known. Will continue to monitor. Problem: Falls - Risk of:  Goal: Absence of physical injury  Description  Absence of physical injury  Outcome: Ongoing  Note:   Pt absent from physical injury on this shift      Problem: Skin Integrity:  Goal: Will show no infection signs and symptoms  Description  Will show no infection signs and symptoms  Outcome: Ongoing  Note:   Monitoring pt for signs/symptoms of infection      Problem: Skin Integrity:  Goal: Absence of new skin breakdown  Description  Absence of new skin breakdown  Outcome: Ongoing  Note:   Skin being assessed during this shift. Julio protocol in place. Feet being elevated. Encouraging pt to turn every 2 hours. No signs of new skin breakdown. Will continue to monitor and reassess. Problem: Musculor/Skeletal Functional Status  Goal: Highest potential functional level  Outcome: Ongoing  Note:   Function level remains ongoing      Problem: Musculor/Skeletal Functional Status  Goal: Absence of falls  Outcome: Ongoing  Note:   Pt absent from falls       Problem: Risk for Impaired Skin Integrity  Goal: Tissue integrity - skin and mucous membranes  Description  Structural intactness and normal physiological function of skin and  mucous membranes. Outcome: Ongoing  Note:   Skin being assessed during this shift. Julio protocol in place. Feet being elevated. Encouraging pt to turn every 2 hours. No signs of new skin breakdown.  Will continue to monitor and reassess.

## 2019-05-27 NOTE — PROGRESS NOTES
Hospitalist Progress Note      PCP: Marcela De Jesus MD    Date of Admission: 5/24/2019        Subjective: Pt c/o continued fatigue, concerned that he becomes confused easily. C/o diarrhea      Medications:  Reviewed    Infusion Medications   Scheduled Medications    meropenem  500 mg Intravenous Q6H    lactulose  30 g Oral TID    rifaximin  550 mg Oral BID    sodium chloride flush  10 mL Intravenous 2 times per day    famotidine  20 mg Oral BID    enoxaparin  40 mg Subcutaneous Nightly    torsemide  20 mg Oral Daily    tamsulosin  0.4 mg Oral Nightly    nadolol  20 mg Oral Daily    miconazole   Topical BID    amiodarone  200 mg Oral Daily    mupirocin   Topical Daily     PRN Meds: sodium chloride flush, magnesium hydroxide, ondansetron, acetaminophen      Intake/Output Summary (Last 24 hours) at 5/27/2019 1359  Last data filed at 5/27/2019 1257  Gross per 24 hour   Intake 1640 ml   Output 1675 ml   Net -35 ml       Physical Exam Performed:  Vitals: BP (!) 98/58 Comment: manual  Pulse 66   Temp 98.2 °F (36.8 °C) (Oral)   Resp 18   Ht 6' (1.829 m)   Wt 150 lb 2.1 oz (68.1 kg)   SpO2 95%   BMI 20.36 kg/m²   General appearance: WD/WN 77y.o. year-old  male who is alert, appears stated age and is cooperative  HEENT: Head: Normocephalic, no lesions, without obvious abnormality. Eye: Normal external eye, conjunctiva, lids cornea, VARSHA. Ears: Normal TM's bilaterally. Normal auditory canals and external ears. Non-tender. Nose: Normal external nose, mucus membranes and septum. Pharynx: Dental Hygiene adequate. Normal buccal mucosa. Normal pharynx. Neck: no adenopathy, no carotid bruit, no JVD, supple, symmetrical, trachea midline and thyroid not enlarged, symmetric, no tenderness/mass/nodules  Lungs: clear to auscultation bilaterally and no use of accessory muscles.   Heart: regular rate and rhythm, S1, S2 normal, no murmur, click, rub or gallop and normal apical impulse  Abdomen: soft, non-tender; bowel sounds normal; no masses,  no organomegaly  Extremities: extremities normal, atraumatic, no cyanosis or edema and Homans sign is negative, no sign of DVT  Skin: Skin color, texture, turgor normal. No rashes or lesions  Neurologic: Mental status: Alert, oriented, thought content appropriate, Cranial nerves II-XII intact; grossly non focal. Gait was not tested. Labs:   Recent Labs     05/25/19  0514 05/26/19  0556 05/27/19  0533   WBC 4.7 4.0 4.3   HGB 11.5* 11.0* 10.3*   HCT 33.9* 32.3* 30.0*   PLT 78* 75* 66*     Recent Labs     05/25/19  0513 05/26/19  0556    140   K 3.0* 3.1*    104   CO2 25 25   BUN 12 12   CREATININE 1.1 1.3   CALCIUM 8.5 8.7     Recent Labs     05/26/19  0556   AST 27   ALT 12   BILITOT 2.5*   ALKPHOS 83     No results for input(s): INR in the last 72 hours. No results for input(s): Georga Rout in the last 72 hours. Urinalysis:      Lab Results   Component Value Date    NITRU Negative 05/24/2019    WBCUA 28 05/24/2019    BACTERIA 1+ 05/24/2019    RBCUA 22 05/24/2019    BLOODU MODERATE 05/24/2019    SPECGRAV 1.008 05/24/2019    GLUCOSEU Negative 05/24/2019    GLUCOSEU NEGATIVE 06/27/2011       Radiology:  CT Head WO Contrast   Final Result   1. Moderate bilateral extra-axial spaces likely due to hygromas. XR PELVIS (1-2 VIEWS)   Final Result   No acute finding in the pelvis. Degenerative changes slightly progressed in   the bilateral hips.          XR CHEST PORTABLE   Final Result   No acute findings in the chest.                 Assessment/Plan:    Active Hospital Problems    Diagnosis    Hepatic encephalopathy ([facility] [K72.90]    Hyperammonemia ([address] [E72.20]    Thrombocytopenia ([address] [D69.6]    Hypokalemia [E87.6]    Hypomagnesemia [E83.42]    Hyperbilirubinemia [E80.6]    Urinary tract infection associated with cystostomy catheter ([address] [T83.510A, N39.0]    Generalized weakness [R53.1]    Paroxysmal atrial fibrillation ([facility] [W36.2]    Alcoholic cirrhosis ([facility] [V39.43]    Dementia [F03.90]     1. Hepatic encephalopathy in the setting of alcoholic cirrhosis and, hyperammonemia, started Lactulose and Rifaximin. certainly UTI contributing, noticed some improvement in mental status, continue to monitor. 2.Hyperbilirubinemia, appears chronic. GI consult appreciated  3. Thrombocytopenia, likel secondary to splenomegaly in the setting of liver cirrhosis. 4. Urinary tract infection associated with cystostomy catheter ([facility] was started on Zosyn empirically. Urine culture growing Acinetobacter baumannii/haemolyticus and Serratia fonticola. Changed antibiotic to Meropenem. 5. Acute Metabolic Encephalopathy due to UTI superimposed on hepatic encephalopathy - slowly improving  5. Hypokalemia - Potassium replaced previously; will recheck levels in the am  6. Generalized weakness with multiple falls, PT/OT to evaluate and treat patient. 7. Paroxysmal atrial fibrillation ([facility] currently rate controlled; continue Amiodarone and Nadolol  8. Dementia, confusion worse than baseline, monitor closely. 9. Borderline hypotension, expected with liver disease, does not look septic at this time, will monitor closely. Discussed [facility] nurse to check frequently and manually.          Diet: DIET GENERAL;  Code Status: Full Code      Dispo - ongoing management     Manny Norman MD

## 2019-05-27 NOTE — PROGRESS NOTES
Pt resting in bed. Ordering dinner for pt. Denies pain or nausea. Will draining clear yellow urine. Pt having multiple BM's from lactulose. Call light within reach. Bed alarm on and fall precautions remain in place. Will continue to monitor and reassess.   Electronically signed by Mallory Lo RN on 5/27/2019 at 5:35 PM

## 2019-05-28 LAB
AMMONIA: 64 UMOL/L (ref 16–60)
ANION GAP SERPL CALCULATED.3IONS-SCNC: 12 MMOL/L (ref 3–16)
BASOPHILS ABSOLUTE: 0 K/UL (ref 0–0.2)
BASOPHILS RELATIVE PERCENT: 1.1 %
BUN BLDV-MCNC: 10 MG/DL (ref 7–20)
CALCIUM SERPL-MCNC: 8.2 MG/DL (ref 8.3–10.6)
CHLORIDE BLD-SCNC: 103 MMOL/L (ref 99–110)
CO2: 23 MMOL/L (ref 21–32)
CREAT SERPL-MCNC: 1.3 MG/DL (ref 0.8–1.3)
EOSINOPHILS ABSOLUTE: 0.1 K/UL (ref 0–0.6)
EOSINOPHILS RELATIVE PERCENT: 3.1 %
GFR AFRICAN AMERICAN: >60
GFR NON-AFRICAN AMERICAN: 55
GLUCOSE BLD-MCNC: 116 MG/DL (ref 70–99)
HCT VFR BLD CALC: 30.6 % (ref 40.5–52.5)
HEMOGLOBIN: 10.4 G/DL (ref 13.5–17.5)
LYMPHOCYTES ABSOLUTE: 1.6 K/UL (ref 1–5.1)
LYMPHOCYTES RELATIVE PERCENT: 42.1 %
MAGNESIUM: 1.7 MG/DL (ref 1.8–2.4)
MAGNESIUM: 2.2 MG/DL (ref 1.8–2.4)
MCH RBC QN AUTO: 36.6 PG (ref 26–34)
MCHC RBC AUTO-ENTMCNC: 34.1 G/DL (ref 31–36)
MCV RBC AUTO: 107.3 FL (ref 80–100)
MONOCYTES ABSOLUTE: 0.5 K/UL (ref 0–1.3)
MONOCYTES RELATIVE PERCENT: 14.5 %
NEUTROPHILS ABSOLUTE: 1.5 K/UL (ref 1.7–7.7)
NEUTROPHILS RELATIVE PERCENT: 39.2 %
PDW BLD-RTO: 16.5 % (ref 12.4–15.4)
PLATELET # BLD: 67 K/UL (ref 135–450)
PMV BLD AUTO: 9.8 FL (ref 5–10.5)
POTASSIUM REFLEX MAGNESIUM: 2.4 MMOL/L (ref 3.5–5.1)
POTASSIUM SERPL-SCNC: 2.8 MMOL/L (ref 3.5–5.1)
RBC # BLD: 2.85 M/UL (ref 4.2–5.9)
SODIUM BLD-SCNC: 138 MMOL/L (ref 136–145)
WBC # BLD: 3.8 K/UL (ref 4–11)

## 2019-05-28 PROCEDURE — 6370000000 HC RX 637 (ALT 250 FOR IP): Performed by: INTERNAL MEDICINE

## 2019-05-28 PROCEDURE — 6360000002 HC RX W HCPCS: Performed by: INTERNAL MEDICINE

## 2019-05-28 PROCEDURE — 1200000000 HC SEMI PRIVATE

## 2019-05-28 PROCEDURE — 85025 COMPLETE CBC W/AUTO DIFF WBC: CPT

## 2019-05-28 PROCEDURE — 97530 THERAPEUTIC ACTIVITIES: CPT

## 2019-05-28 PROCEDURE — 83735 ASSAY OF MAGNESIUM: CPT

## 2019-05-28 PROCEDURE — 97535 SELF CARE MNGMENT TRAINING: CPT

## 2019-05-28 PROCEDURE — 84132 ASSAY OF SERUM POTASSIUM: CPT

## 2019-05-28 PROCEDURE — 2580000003 HC RX 258: Performed by: INTERNAL MEDICINE

## 2019-05-28 PROCEDURE — 80048 BASIC METABOLIC PNL TOTAL CA: CPT

## 2019-05-28 PROCEDURE — 82140 ASSAY OF AMMONIA: CPT

## 2019-05-28 PROCEDURE — 36415 COLL VENOUS BLD VENIPUNCTURE: CPT

## 2019-05-28 RX ORDER — POTASSIUM CHLORIDE 750 MG/1
60 TABLET, FILM COATED, EXTENDED RELEASE ORAL ONCE
Status: COMPLETED | OUTPATIENT
Start: 2019-05-28 | End: 2019-05-28

## 2019-05-28 RX ORDER — MAGNESIUM SULFATE IN WATER 40 MG/ML
2 INJECTION, SOLUTION INTRAVENOUS ONCE
Status: COMPLETED | OUTPATIENT
Start: 2019-05-28 | End: 2019-05-28

## 2019-05-28 RX ORDER — LACTULOSE 10 G/15ML
30 SOLUTION ORAL 2 TIMES DAILY
Status: DISCONTINUED | OUTPATIENT
Start: 2019-05-28 | End: 2019-05-29 | Stop reason: HOSPADM

## 2019-05-28 RX ADMIN — MICONAZOLE NITRATE: 20 POWDER TOPICAL at 20:13

## 2019-05-28 RX ADMIN — RIFAXIMIN 550 MG: 550 TABLET ORAL at 20:11

## 2019-05-28 RX ADMIN — Medication 10 ML: at 09:34

## 2019-05-28 RX ADMIN — MICONAZOLE NITRATE: 20 POWDER TOPICAL at 09:34

## 2019-05-28 RX ADMIN — MAGNESIUM SULFATE HEPTAHYDRATE 2 G: 40 INJECTION, SOLUTION INTRAVENOUS at 10:12

## 2019-05-28 RX ADMIN — ENOXAPARIN SODIUM 40 MG: 40 INJECTION SUBCUTANEOUS at 20:12

## 2019-05-28 RX ADMIN — MEROPENEM 500 MG: 500 INJECTION, POWDER, FOR SOLUTION INTRAVENOUS at 20:11

## 2019-05-28 RX ADMIN — MEROPENEM 500 MG: 500 INJECTION, POWDER, FOR SOLUTION INTRAVENOUS at 14:47

## 2019-05-28 RX ADMIN — AMIODARONE HYDROCHLORIDE 200 MG: 200 TABLET ORAL at 09:33

## 2019-05-28 RX ADMIN — POTASSIUM CHLORIDE 60 MEQ: 750 TABLET, EXTENDED RELEASE ORAL at 18:29

## 2019-05-28 RX ADMIN — LACTULOSE 30 G: 20 SOLUTION ORAL at 09:34

## 2019-05-28 RX ADMIN — FAMOTIDINE 20 MG: 20 TABLET ORAL at 09:34

## 2019-05-28 RX ADMIN — LACTULOSE 30 G: 20 SOLUTION ORAL at 20:12

## 2019-05-28 RX ADMIN — FAMOTIDINE 20 MG: 20 TABLET ORAL at 20:12

## 2019-05-28 RX ADMIN — MUPIROCIN: 2 CREAM TOPICAL at 09:34

## 2019-05-28 RX ADMIN — MEROPENEM 500 MG: 500 INJECTION, POWDER, FOR SOLUTION INTRAVENOUS at 01:59

## 2019-05-28 RX ADMIN — RIFAXIMIN 550 MG: 550 TABLET ORAL at 09:34

## 2019-05-28 RX ADMIN — Medication 10 ML: at 20:13

## 2019-05-28 RX ADMIN — POTASSIUM CHLORIDE 60 MEQ: 750 TABLET, EXTENDED RELEASE ORAL at 09:33

## 2019-05-28 RX ADMIN — MEROPENEM 500 MG: 500 INJECTION, POWDER, FOR SOLUTION INTRAVENOUS at 09:34

## 2019-05-28 RX ADMIN — TAMSULOSIN HYDROCHLORIDE 0.4 MG: 0.4 CAPSULE ORAL at 20:12

## 2019-05-28 ASSESSMENT — PAIN SCALES - GENERAL
PAINLEVEL_OUTOF10: 0

## 2019-05-28 NOTE — PROGRESS NOTES
Patient resting comfortably in chair. Tolerating PO. Patient denies pain at this time. Call light within reach. Will continue to monitor and reassess.  Electronically signed by Ranulfo Jacques RN on 5/28/2019

## 2019-05-28 NOTE — PROGRESS NOTES
Physical Therapy    Facility/Department: [address] ORTHOPEDICS  Progress Note    NAME: Sandy Gillespie  : 1950  MRN: [id number]    Date of Service: 2019    Discharge Recommendations:  Sandy Gillespie scored a 10/24 on the AM-PAC short mobility form. Current research shows that an AM-PAC score of 17 or less is typically not associated with a discharge to the patient's home setting. Based on the patients AM-PAC score and their current functional mobility deficits, it is recommended that the patient have 3-5 sessions per week of Physical Therapy at d/c to increase the patients independence. Assessment   Body structures, Functions, Activity limitations: Decreased functional mobility ; Decreased endurance;Decreased ADL status; Decreased safe awareness;Decreased balance;Decreased cognition  Prognosis: Fair  REQUIRES PT FOLLOW UP: Yes  Activity Tolerance  Activity Tolerance: Patient limited by fatigue;Patient limited by cognitive status       Patient Diagnosis(es): The primary encounter diagnosis was Hepatic encephalopathy ([address] Diagnoses of Fall, initial encounter and Hygroma were also pertinent to this visit. has a past medical history of Abnormality of gait, Alcohol dependence ([address] Alcoholic cirrhosis ([address] Anxiety, Arthritis, Basal cell carcinoma, Cerebral artery occlusion with cerebral infarction ([address] Circulation problem, Dementia, Depressive disorder, GERD (gastroesophageal reflux disease), Hypomagnesemia, Hypopotassemia, SOB (shortness of breath), Spinal stenosis, Suicidal behavior, Syncope, Thrombocytopenia ([address] Traumatic brain injury ([address] Wears dentures, Wears glasses, and Wheezing. has a past surgical history that includes Upper gastrointestinal endoscopy; Colonoscopy; Upper gastrointestinal endoscopy (2018); and Colonoscopy (2018).     Restrictions  Restrictions/Precautions  Restrictions/Precautions: Fall Risk  Position Activity Restriction  Other position/activity restrictions: IV; christian  Vision/Hearing        Subjective  General  Chart Reviewed: Yes  Patient assessed for rehabilitation services?: Yes(Simultaneous filing. User may not have seen previous data.)  Additional Pertinent Hx: here due to multiple falls at home - PMH is extensive including alcoholism       Response To Previous Treatment: Patient with no complaints from previous session. Family / Caregiver Present: No  Follows Commands: Within Functional Limits  Subjective  Subjective: [facility] Awake and oriented to self, place, and month. Agreeable to PT OT session. Pain Screening  Patient Currently in Pain: Denies    Social/Functional History  Social/Functional History  Lives With: Spouse  Type of Home: House  Home Layout: One level  Home Access: Stairs to enter with rails  Entrance Stairs - Number of Steps: 8-10 Flight of steps enterring through basement  Entrance Stairs - Rails: Left  Bathroom Shower/Tub: Walk-in shower  Bathroom Toilet: Handicap height  Bathroom Equipment: Shower chair(with back)  Home Equipment: Rolling walker, Cane(does not use the walker unless \"Going downstairs\")  ADL Assistance: Needs assistance(Assist for bathing from wife)  Homemaking Responsibilities: No  Ambulation Assistance: Needs assistance(wife assisted)  Transfer Assistance: Needs assistance  Active : No(Pt reports he drives but sister reports he does not anymore)  Occupation: Retired  Type of occupation: Real estate atttorney  Leisure & Hobbies: Watch tv  Additional Comments: Wife is home \"most of the time\". Pt is not a relieable historian according to his sister who is present. Objective  Motor Control  Gross Motor?: WFL     Bed mobility  Supine to Sit: (Not observed. Stayed in chair during session.)  Transfers  Sit to Stand: Minimal Assistance;2 Person Assistance  Stand to sit: Minimal Assistance  Comment: While standing at walker, pt endorses lightheadedness when asked.  Attempted to have pt lateral weight shifting but appeared to become more lightheaded so pt was sat down and legs elevated. Ambulation  Ambulation?: No  Stairs/Curb  Stairs?: No     Balance  Sitting - Static: Good  Standing - Static: Fair(on RW)  Standing - Dynamic: (Not assessed)  Comments: Midline in static stance on the walker with min assist of 2. Plan   Plan  Times per week: 3-5  Current Treatment Recommendations: Functional Mobility Training, Transfer Training, Gait Training, Positioning, Patient/Caregiver Education & Training, ADL/Self-care Training  Safety Devices  Type of devices: All fall risk precautions in place, Call light within reach, Chair alarm in place, Left in chair, Nurse notified(Snow)    AM-PAC Score  AM-PAC Inpatient Mobility Raw Score : 10  AM-PAC Inpatient T-Scale Score : 32.29  Mobility Inpatient CMS 0-100% Score: 76.75  Mobility Inpatient CMS G-Code Modifier : CL          Goals  Short term goals  Time Frame for Short term goals: 2-3 days   Short term goal 1: bed mobility at [address] [place] [address] term goal 2: transfers at [address] [place] [address] term goal 3: ambulation at [facility] SBA rolling walker for 40-50 feet   Short term goal 4: steps? ? one rail with min assist of one as needed for home entry  Patient Goals   Patient goals : none formulated at this session       Therapy Time   Individual Concurrent Group Co-treatment   Time In 0955         Time Out 1020         Minutes 48 Sherri Camacho, PT

## 2019-05-28 NOTE — PLAN OF CARE
Call light within reach. Pt educated on calling for assistance before getting up. Walkway free of clutter. Will continue to monitor. Electronically signed by Moses Chilel RN on 5/28/2019 at 10:42 AM       Problem: Risk for Impaired Skin Integrity  Goal: Tissue integrity - skin and mucous membranes  Description  Structural intactness and normal physiological function of skin and  mucous membranes. 5/28/2019 1040 by Moses Chilel RN  Outcome: Ongoing  Note:   Patient assessed for skin breakdown. Will assist patient with turning or repositioning q2hrs. Will continue to monitor and reassess.   Electronically signed by Moses Chilel RN on 5/28/2019 at 10:42 AM

## 2019-05-28 NOTE — PROGRESS NOTES
Patient now has a potassium level of 2.8. Dr. Caty Patricia aware and notified. See new order for oral potassium. Will continue to monitor and reassess.  Electronically signed by Kary Lyons RN on 5/28/2019

## 2019-05-28 NOTE — PROGRESS NOTES
CALCIUM 8.7  --  8.2*   PROT 6.3*  --   --    LABALBU 2.4*  --   --    AST 27  --   --    ALT 12  --   --    ALKPHOS 83  --   --    BILITOT 2.5*  --   --    AMMONIA 72* 72* 64*   MG  --   --  1.70*       Assessment:     Patient Active Problem List    Diagnosis Date Noted    Hepatic encephalopathy ([facility] 05/24/2019    Hyperammonemia (HCC) 05/24/2019    Thrombocytopenia (HCC) 05/24/2019    Hypokalemia 05/24/2019    Hypomagnesemia 05/24/2019    Hyperbilirubinemia 05/24/2019    Urinary tract infection associated with cystostomy catheter ([facility] 05/24/2019    Generalized weakness 05/24/2019    Paroxysmal atrial fibrillation ([facility] 94/51/0990    Alcoholic cirrhosis ([facility]     Dementia        Plan:   1. Complains of diarrhea on lactulose. Ammonia is improved. Will decrease dose to bid.

## 2019-05-28 NOTE — CARE COORDINATION
Sw spoke with patient regarding dc needs. He reported living with his spouse. He is able to manage his adls with use of cane; he drives. He is active with home care Rn ([address]). He is unsure of his dc needs but prefers to go home. He gave Sw permission to call his spouse. Snf list left for patient in case snf is needed. Sw attempted to call his spouse--phone rings and goes dead. Work number--she retired two years ago. Will continue to try and contact spouse. Electronically signed by Ericka Ramos on 5/28/2019 at 3:31 PM      4:18 PM  Patient's spouse present in room. Sw reviewed therapy recommendations for snf placement. She is agreeable to [facility]. Sw made referrals to above facilities. Spouse is aware her home phone is not working. Her cell is [phone] is not set up. Electronically signed by Ericka Ramos on 5/28/2019 at 4:20 PM      4:48 PM  Sw spoke with patient's spouse to inform her--both [facility] and [facility] have beds.  She will let Sw know her decision    Electronically signed by Ericka Ramos on 5/28/2019 at 4:49 PM  .

## 2019-05-28 NOTE — PROGRESS NOTES
Occupational Therapy  Facility/Department: [address] ORTHOPEDICS  Daily Treatment Note  NAME: Maryellen Zapata  : 1950  MRN: [id number]    Date of Service: 2019    Discharge Recommendations:  Patient would benefit from continued therapy after discharge, 3-5 sessions per week    Maryellen Zapata scored a 14/24 on the AM-PAC ADL Inpatient form. Current research shows that an AM-PAC score of 17 or less is typically not associated with a discharge to the patient's home setting. Based on the patients AM-PAC score and their current ADL deficits, it is recommended that the patient have 3-5 sessions per week of Occupational Therapy at d/c to increase the patients independence. Assessment   Performance deficits / Impairments: Decreased functional mobility ; Decreased endurance;Decreased ADL status; Decreased safe awareness;Decreased cognition;Decreased balance  Assessment: Discussed with OTR: Pt tolerated session fairly well this date. Pt up in chair at start of tx. Pt completed sit><stands with Min A x2 and stood at walker with CGA x1 for 1 min or less and c/o lightheaded. Pt required up to Mod A for ADL's. Pt is functioning below his baseline and would benefit from cont OT at d/c prior to returning home. Cont poc. Treatment Diagnosis: Imp ADLs, functional mobility and transfers, decreased safety and balance   Prognosis: Good  Patient Education: Role of OT, ADL's; safety for sit><stands  REQUIRES OT FOLLOW UP: Yes  Activity Tolerance  Activity Tolerance: Patient limited by fatigue;Treatment limited secondary to medical complications (free text); Treatment limited secondary to decreased cognition  Activity Tolerance: c/o lightheadedness in stance  Safety Devices  Safety Devices in place: Nikia Linda aware)  Type of devices: Call light within reach; Left in chair;Chair alarm in place;Nurse notified;Gait belt         Patient Diagnosis(es): The primary encounter diagnosis was Hepatic encephalopathy ([facility] Diagnoses of Fall, initial encounter and Hygroma were also pertinent to this visit. has a past medical history of Abnormality of gait, Alcohol dependence ([address] Alcoholic cirrhosis ([address] Anxiety, Arthritis, Basal cell carcinoma, Cerebral artery occlusion with cerebral infarction ([address] Circulation problem, Dementia, Depressive disorder, GERD (gastroesophageal reflux disease), Hypomagnesemia, Hypopotassemia, SOB (shortness of breath), Spinal stenosis, Suicidal behavior, Syncope, Thrombocytopenia ([address] Traumatic brain injury ([address] Wears dentures, Wears glasses, and Wheezing. has a past surgical history that includes Upper gastrointestinal endoscopy; Colonoscopy; Upper gastrointestinal endoscopy (01/04/2018); and Colonoscopy (08/22/2018). Restrictions  Restrictions/Precautions  Restrictions/Precautions: Fall Risk  Position Activity Restriction  Other position/activity restrictions: IV; christian  Subjective   General  Chart Reviewed: Yes  Patient assessed for rehabilitation services?: Yes(Simultaneous filing. User may not have seen previous data.)  Additional Pertinent Hx: Patient is a 71-year-old man with a history of paroxysmal atrial fibrillation, alcoholic liver cirrhosis, dementia and a cystostomy catheter in place who was brought to the emergency room by his wife for evaluation of worsening infusion and weakness. She states that he has fallen three times since last night. In the emergency room he was found to have both a urinary tract infection and an elevated ammonia level. He has no prior history of hepatic encephalopathy  Family / Caregiver Present: No  Referring Practitioner: Blas  Diagnosis: Hepatic enceph and cirrhosis from ETOH  Subjective  Subjective: Pt seen BS, up in recliner and agreeable to OT/PT co-tx. Pt reporting pain, in abdomen, 4/10. General Comment  Comments: .       Orientation  Orientation  Overall Orientation Status: Within Functional Limits  Orientation Level: Oriented to ADL T-Scale Score : 33.39  ADL Inpatient CMS 0-100% Score: 59.67  ADL Inpatient CMS G-Code Modifier : CK    Goals  Short term goals  Time Frame for Short term goals: Status: goals ongoing  Short term goal 1: SBA UE ADLs  Short term goal 2: Min/mod assist LE ADLs  Short term goal 3: Min assist bed mobility  Short term goal 4: Min assist functional transfers to bed, chair and toilet with safe RW technique  Short term goal 5: Min assist functional mobility at RW level with 1-2 verbal cues for walker safety  Long term goals  Time Frame for Long term goals : TBD  Patient Goals   Patient goals : None formulated at this session       Therapy Time   Individual Concurrent Group Co-treatment   Time In 1000         Time Out 1025         Minutes 25                 Edgardo Cardenas, SHEILA

## 2019-05-28 NOTE — PROGRESS NOTES
Pt A&Ox4 more alert and talkative with staff, no BM noted thus far in the shift. Pt took and tolerated lactulose with out difficulty. Calm and compliant with care. Will f/u with AM labs. Pain /discomfort being managed with PRN analgesics per MD orders. Patient able to express presence and absence of pain and rate pain appropriately using numerical scale. Vitals signs are stable.   Intake and output are being recorded, will continue to monitor

## 2019-05-28 NOTE — CARE COORDINATION
[facility]  Patient is active with [facility], Nurse and PT.    Maria R Monson LPN  CTN with  [facility],  [address], Fax [phone]

## 2019-05-28 NOTE — PROGRESS NOTES
Hospitalist Progress Note      PCP: Rogelio Knox MD    Date of Admission: 5/24/2019        Subjective: Pt reports diarrhea (Lactulose), otherwise fine       Medications:  Reviewed    Infusion Medications   Scheduled Medications    magnesium sulfate  2 g Intravenous Once    meropenem  500 mg Intravenous Q6H    lactulose  30 g Oral TID    rifaximin  550 mg Oral BID    sodium chloride flush  10 mL Intravenous 2 times per day    famotidine  20 mg Oral BID    enoxaparin  40 mg Subcutaneous Nightly    torsemide  20 mg Oral Daily    tamsulosin  0.4 mg Oral Nightly    nadolol  20 mg Oral Daily    miconazole   Topical BID    amiodarone  200 mg Oral Daily    mupirocin   Topical Daily     PRN Meds: sodium chloride flush, magnesium hydroxide, ondansetron, acetaminophen      Intake/Output Summary (Last 24 hours) at 5/28/2019 0949  Last data filed at 5/28/2019 0135  Gross per 24 hour   Intake 700 ml   Output 1950 ml   Net -1250 ml       Physical Exam Performed:  Vitals: BP 99/63   Pulse 90   Temp 97.9 °F (36.6 °C) (Oral)   Resp 16   Ht 6' (1.829 m)   Wt 150 lb 12.7 oz (68.4 kg)   SpO2 95%   BMI 20.45 kg/m²   General appearance: WD/WN 77y.o. year-old  male who is sitting in a chair alert, appears stated age and is cooperative  HEENT: Head: Normocephalic, no lesions, without obvious abnormality. Eye: Normal external eye, conjunctiva, lids cornea, VARSHA. Ears: Normal TM's bilaterally. Normal auditory canals and external ears. Non-tender. Nose: Normal external nose, mucus membranes and septum. Pharynx: Dental Hygiene adequate. Normal buccal mucosa. Normal pharynx. Neck: no adenopathy, no carotid bruit, no JVD, supple, symmetrical, trachea midline and thyroid not enlarged, symmetric, no tenderness/mass/nodules  Lungs: clear to auscultation bilaterally and no use of accessory muscles.   Heart: regular rate and rhythm, S1, S2 normal, no murmur, click, rub or gallop and normal apical impulse  Abdomen: soft, non-tender; bowel sounds normal; no masses,  no organomegaly  Extremities: extremities normal, atraumatic, no cyanosis or edema and Homans sign is negative, no sign of DVT  Skin: Skin color, texture, turgor normal. No rashes or lesions  Neurologic: Mental status: Alert, oriented, thought content appropriate, Cranial nerves II-XII intact; grossly non focal. Gait was not tested. Labs:   Recent Labs     05/26/19  0556 05/27/19  0533 05/28/19  0556   WBC 4.0 4.3 3.8*   HGB 11.0* 10.3* 10.4*   HCT 32.3* 30.0* 30.6*   PLT 75* 66* 67*     Recent Labs     05/26/19  0556 05/28/19  0556    138   K 3.1* 2.4*    103   CO2 25 23   BUN 12 10   CREATININE 1.3 1.3   CALCIUM 8.7 8.2*     Recent Labs     05/26/19  0556   AST 27   ALT 12   BILITOT 2.5*   ALKPHOS 83     No results for input(s): INR in the last 72 hours. No results for input(s): Eward Pong in the last 72 hours. Urinalysis:      Lab Results   Component Value Date    NITRU Negative 05/24/2019    WBCUA 28 05/24/2019    BACTERIA 1+ 05/24/2019    RBCUA 22 05/24/2019    BLOODU MODERATE 05/24/2019    SPECGRAV 1.008 05/24/2019    GLUCOSEU Negative 05/24/2019    GLUCOSEU NEGATIVE 06/27/2011       Radiology:  CT Head WO Contrast   Final Result   1. Moderate bilateral extra-axial spaces likely due to hygromas. XR PELVIS (1-2 VIEWS)   Final Result   No acute finding in the pelvis. Degenerative changes slightly progressed in   the bilateral hips.          XR CHEST PORTABLE   Final Result   No acute findings in the chest.                 Assessment/Plan:    Active Hospital Problems    Diagnosis    Hepatic encephalopathy ([address] [K72.90]    Hyperammonemia ([address] [E72.20]    Thrombocytopenia ([address] [D69.6]    Hypokalemia [E87.6]    Hypomagnesemia [E83.42]    Hyperbilirubinemia [E80.6]    Urinary tract infection associated with cystostomy catheter ([address] [T83.510A, N39.0]    Generalized weakness [R53.1]    Paroxysmal atrial fibrillation (HCC) [E94.0]    Alcoholic cirrhosis ([facility] [Q02.81]    Dementia [F03.90]     1. Hepatic encephalopathy in the setting of alcoholic cirrhosis and, hyperammonemia, started Lactulose and Rifaximin. certainly UTI contributing. Contnued  improvement in mental status, continue to monitor. 2.Hyperbilirubinemia, appears chronic. GI consult appreciated  3. Thrombocytopenia, secondary to splenomegaly in the setting of liver cirrhosis. 4. Urinary tract infection associated with cystostomy catheter ([facility] was started on Zosyn empirically. Urine culture growing Acinetobacter baumannii/haemolyticus and Serratia fonticola. Changed antibiotic to Meropenem. 5. Acute Metabolic Encephalopathy due to UTI superimposed on hepatic encephalopathy - slowly improving  6 Hypokalemia - Potassium replaced today, recheck in the afternoon  7. Hypomagnesemia - replace Magnesium  8. Generalized weakness with multiple falls, PT/OT to evaluate and treat patient. 9. Paroxysmal atrial fibrillation ([facility] currently rate controlled; continue Amiodarone and Nadolol  10. Dementia, confusion worse than baseline, monitor closely. 11. Borderline hypotension, expected with liver disease, does not look septic at this time, will monitor closely. Discussed [facility] nurse to check frequently and manually.          Diet: DIET GENERAL;  Code Status: Full Code  PT/OT ordered    Dispo - ongoing management     Robyn Bower MD

## 2019-05-29 VITALS
RESPIRATION RATE: 16 BRPM | BODY MASS INDEX: 20.6 KG/M2 | OXYGEN SATURATION: 94 % | TEMPERATURE: 98.9 F | HEIGHT: 72 IN | HEART RATE: 87 BPM | SYSTOLIC BLOOD PRESSURE: 125 MMHG | DIASTOLIC BLOOD PRESSURE: 70 MMHG | WEIGHT: 152.12 LBS

## 2019-05-29 LAB
AMMONIA: 59 UMOL/L (ref 16–60)
ANION GAP SERPL CALCULATED.3IONS-SCNC: 11 MMOL/L (ref 3–16)
BASOPHILS ABSOLUTE: 0 K/UL (ref 0–0.2)
BASOPHILS RELATIVE PERCENT: 1.1 %
BUN BLDV-MCNC: 10 MG/DL (ref 7–20)
CALCIUM SERPL-MCNC: 8.6 MG/DL (ref 8.3–10.6)
CHLORIDE BLD-SCNC: 105 MMOL/L (ref 99–110)
CO2: 24 MMOL/L (ref 21–32)
CREAT SERPL-MCNC: 1.1 MG/DL (ref 0.8–1.3)
EOSINOPHILS ABSOLUTE: 0.1 K/UL (ref 0–0.6)
EOSINOPHILS RELATIVE PERCENT: 2.3 %
GFR AFRICAN AMERICAN: >60
GFR NON-AFRICAN AMERICAN: >60
GLUCOSE BLD-MCNC: 102 MG/DL (ref 70–99)
HCT VFR BLD CALC: 30.8 % (ref 40.5–52.5)
HEMOGLOBIN: 10.4 G/DL (ref 13.5–17.5)
LYMPHOCYTES ABSOLUTE: 1.5 K/UL (ref 1–5.1)
LYMPHOCYTES RELATIVE PERCENT: 36.9 %
MAGNESIUM: 1.8 MG/DL (ref 1.8–2.4)
MCH RBC QN AUTO: 36.2 PG (ref 26–34)
MCHC RBC AUTO-ENTMCNC: 33.7 G/DL (ref 31–36)
MCV RBC AUTO: 107.5 FL (ref 80–100)
MONOCYTES ABSOLUTE: 0.5 K/UL (ref 0–1.3)
MONOCYTES RELATIVE PERCENT: 13.3 %
NEUTROPHILS ABSOLUTE: 1.8 K/UL (ref 1.7–7.7)
NEUTROPHILS RELATIVE PERCENT: 46.4 %
PDW BLD-RTO: 16.2 % (ref 12.4–15.4)
PLATELET # BLD: 67 K/UL (ref 135–450)
PMV BLD AUTO: 9.8 FL (ref 5–10.5)
POTASSIUM REFLEX MAGNESIUM: 2.9 MMOL/L (ref 3.5–5.1)
RBC # BLD: 2.87 M/UL (ref 4.2–5.9)
SODIUM BLD-SCNC: 140 MMOL/L (ref 136–145)
WBC # BLD: 4 K/UL (ref 4–11)

## 2019-05-29 PROCEDURE — 82140 ASSAY OF AMMONIA: CPT

## 2019-05-29 PROCEDURE — 97116 GAIT TRAINING THERAPY: CPT

## 2019-05-29 PROCEDURE — 6360000002 HC RX W HCPCS: Performed by: INTERNAL MEDICINE

## 2019-05-29 PROCEDURE — 97530 THERAPEUTIC ACTIVITIES: CPT

## 2019-05-29 PROCEDURE — 97535 SELF CARE MNGMENT TRAINING: CPT

## 2019-05-29 PROCEDURE — 85025 COMPLETE CBC W/AUTO DIFF WBC: CPT

## 2019-05-29 PROCEDURE — 6370000000 HC RX 637 (ALT 250 FOR IP): Performed by: INTERNAL MEDICINE

## 2019-05-29 PROCEDURE — 94760 N-INVAS EAR/PLS OXIMETRY 1: CPT

## 2019-05-29 PROCEDURE — 83735 ASSAY OF MAGNESIUM: CPT

## 2019-05-29 PROCEDURE — 2580000003 HC RX 258: Performed by: INTERNAL MEDICINE

## 2019-05-29 PROCEDURE — 80048 BASIC METABOLIC PNL TOTAL CA: CPT

## 2019-05-29 PROCEDURE — 36415 COLL VENOUS BLD VENIPUNCTURE: CPT

## 2019-05-29 RX ORDER — POTASSIUM CHLORIDE 20 MEQ/1
40 TABLET, EXTENDED RELEASE ORAL 2 TIMES DAILY
Qty: 60 TABLET | Refills: 0 | DISCHARGE
Start: 2019-05-29 | End: 2019-07-02

## 2019-05-29 RX ORDER — POTASSIUM CHLORIDE 20 MEQ/1
40 TABLET, EXTENDED RELEASE ORAL PRN
Status: DISCONTINUED | OUTPATIENT
Start: 2019-05-29 | End: 2019-05-29

## 2019-05-29 RX ORDER — LANOLIN ALCOHOL/MO/W.PET/CERES
400 CREAM (GRAM) TOPICAL DAILY
Status: DISCONTINUED | OUTPATIENT
Start: 2019-05-29 | End: 2019-05-29 | Stop reason: HOSPADM

## 2019-05-29 RX ORDER — CIPROFLOXACIN 500 MG/1
500 TABLET, FILM COATED ORAL 2 TIMES DAILY
Refills: 0 | DISCHARGE
Start: 2019-05-29 | End: 2019-06-03

## 2019-05-29 RX ORDER — POTASSIUM CHLORIDE 7.45 MG/ML
10 INJECTION INTRAVENOUS PRN
Status: DISCONTINUED | OUTPATIENT
Start: 2019-05-29 | End: 2019-05-29

## 2019-05-29 RX ORDER — POTASSIUM CHLORIDE 750 MG/1
40 TABLET, FILM COATED, EXTENDED RELEASE ORAL ONCE
Status: COMPLETED | OUTPATIENT
Start: 2019-05-29 | End: 2019-05-29

## 2019-05-29 RX ORDER — LACTULOSE 10 G/15ML
30 SOLUTION ORAL 2 TIMES DAILY
Refills: 1 | Status: ON HOLD | DISCHARGE
Start: 2019-05-29 | End: 2019-07-23 | Stop reason: HOSPADM

## 2019-05-29 RX ADMIN — TORSEMIDE 20 MG: 20 TABLET ORAL at 09:11

## 2019-05-29 RX ADMIN — LACTULOSE 30 G: 20 SOLUTION ORAL at 09:12

## 2019-05-29 RX ADMIN — FAMOTIDINE 20 MG: 20 TABLET ORAL at 09:11

## 2019-05-29 RX ADMIN — MEROPENEM 500 MG: 500 INJECTION, POWDER, FOR SOLUTION INTRAVENOUS at 01:16

## 2019-05-29 RX ADMIN — MEROPENEM 500 MG: 500 INJECTION, POWDER, FOR SOLUTION INTRAVENOUS at 09:11

## 2019-05-29 RX ADMIN — POTASSIUM CHLORIDE 10 MEQ: 7.46 INJECTION, SOLUTION INTRAVENOUS at 09:11

## 2019-05-29 RX ADMIN — MEROPENEM 500 MG: 500 INJECTION, POWDER, FOR SOLUTION INTRAVENOUS at 15:06

## 2019-05-29 RX ADMIN — RIFAXIMIN 550 MG: 550 TABLET ORAL at 09:11

## 2019-05-29 RX ADMIN — MUPIROCIN: 2 CREAM TOPICAL at 09:14

## 2019-05-29 RX ADMIN — MAGNESIUM OXIDE TAB 400 MG (240 MG ELEMENTAL MG) 400 MG: 400 (240 MG) TAB at 11:26

## 2019-05-29 RX ADMIN — Medication 10 ML: at 09:13

## 2019-05-29 RX ADMIN — MICONAZOLE NITRATE: 20 POWDER TOPICAL at 09:15

## 2019-05-29 RX ADMIN — POTASSIUM CHLORIDE 10 MEQ: 7.46 INJECTION, SOLUTION INTRAVENOUS at 10:24

## 2019-05-29 RX ADMIN — NADOLOL 20 MG: 40 TABLET ORAL at 09:11

## 2019-05-29 RX ADMIN — AMIODARONE HYDROCHLORIDE 200 MG: 200 TABLET ORAL at 09:12

## 2019-05-29 RX ADMIN — POTASSIUM CHLORIDE 40 MEQ: 750 TABLET, EXTENDED RELEASE ORAL at 11:24

## 2019-05-29 ASSESSMENT — PAIN SCALES - GENERAL
PAINLEVEL_OUTOF10: 0

## 2019-05-29 NOTE — PROGRESS NOTES
Patient provided a phone number for his son as social work is unable to get [employer], the patient's wife, at this time. Patient is uncertain if this number is correct. Son's name is Sin Torrez and the number provided is [phone].

## 2019-05-29 NOTE — PROGRESS NOTES
Gastroenterology Note  Patient:   Trevin Nash   :    1950   Facility:   [facility]   Date:     2019  Consultant:   Suzanne Acosta MD      Subjective:     77 y.o. male admitted 2019 with Hepatic encephalopathy ([facility] [K72.90] and seen for HE. Brenda Torres     Present  Diet Order: DIET GENERAL;      Current Medications include:   Scheduled Meds:   magnesium oxide  400 mg Oral Daily    lactulose  30 g Oral BID    meropenem  500 mg Intravenous Q6H    rifaximin  550 mg Oral BID    sodium chloride flush  10 mL Intravenous 2 times per day    famotidine  20 mg Oral BID    enoxaparin  40 mg Subcutaneous Nightly    torsemide  20 mg Oral Daily    tamsulosin  0.4 mg Oral Nightly    nadolol  20 mg Oral Daily    miconazole   Topical BID    amiodarone  200 mg Oral Daily    mupirocin   Topical Daily     Continuous Infusions:  PRN Meds:.sodium chloride flush, magnesium hydroxide, ondansetron, acetaminophen    Allergies: No Known Allergies    Objective:   Vital Signs:  Temp (24hrs), Av.7 °F (37.1 °C), Min:98.5 °F (36.9 °C), Max:98.9 °F (75.5 °C)     Systolic (35JTA), AKU:205 , Min:121 , DIR:467      Diastolic (12OAW), POJ:27, Min:68, Max:70     Pulse  Av  Min: 87  Max: 87  /70   Pulse 87   Temp 98.9 °F (37.2 °C) (Oral)   Resp 16   Ht 6' (1.829 m)   Wt 152 lb 1.9 oz (69 kg)   SpO2 94%   BMI 20.63 kg/m²      Physical Exam:   General appearance: alert and appears stated age  Lungs: clear to auscultation bilaterally  Heart: regular rate and rhythm, S1, S2 normal, no murmur, click, rub or gallop  Abdomen: soft, non-tender; bowel sounds normal; no masses,  no organomegaly    Lab and Imaging Review   Recent Labs     19  0533 19  0556 19  1519 19  0623 19  0624   WBC 4.3 3.8*  --  4.0  --    HGB 10.3* 10.4*  --  10.4*  --    .3* 107.3*  --  107.5*  --    PLT 66* 67*  --  67*  --    NA  --  138  --   --  140   K  --  2.4* 2.8*  --  2.9*   CL  --  103  --   --  105   CO2  --  23  --   --  24   BUN  --  10  --   --  10   CREATININE  --  1.3  --   --  1.1   GLUCOSE  --  116*  --   --  102*   CALCIUM  --  8.2*  --   --  8.6   AMMONIA 72* 64*  --  59  --    MG  --  1.70* 2.20  --  1.80       Assessment:     Patient Active Problem List    Diagnosis Date Noted    Hepatic encephalopathy ([facility] 05/24/2019    Hyperammonemia (HCC) 05/24/2019    Thrombocytopenia (HCC) 05/24/2019    Hypokalemia 05/24/2019    Hypomagnesemia 05/24/2019    Hyperbilirubinemia 05/24/2019    Urinary [facility] infection associated with cystostomy catheter ([facility] 05/24/2019    Generalized weakness 05/24/2019    Paroxysmal atrial fibrillation ([facility] 75/72/9912    Alcoholic cirrhosis ([facility]     Dementia        Plan:   1. Going to [facility]. Should stay on Lactulose and Xifaxan at [facility].

## 2019-05-29 NOTE — PROGRESS NOTES
this visit. has a past medical history of Abnormality of gait, Alcohol dependence ([address] Alcoholic cirrhosis ([address] Anxiety, Arthritis, Basal cell carcinoma, Cerebral artery occlusion with cerebral infarction ([address] Circulation problem, Dementia, Depressive disorder, GERD (gastroesophageal reflux disease), Hypomagnesemia, Hypopotassemia, SOB (shortness of breath), Spinal stenosis, Suicidal behavior, Syncope, Thrombocytopenia ([address] Traumatic brain injury ([address] Wears dentures, Wears glasses, and Wheezing. has a past surgical history that includes Upper gastrointestinal endoscopy; Colonoscopy; Upper gastrointestinal endoscopy (01/04/2018); and Colonoscopy (08/22/2018). Restrictions  Restrictions/Precautions  Restrictions/Precautions: Fall Risk  Position Activity Restriction  Other position/activity restrictions: Suman  Subjective   General  Chart Reviewed: Yes  Patient assessed for rehabilitation services?: Yes  Additional Pertinent Hx: Patient is a 28-year-old man with a history of paroxysmal atrial fibrillation, alcoholic liver cirrhosis, dementia and a cystostomy catheter in place who was brought to the emergency room by his wife for evaluation of worsening infusion and weakness. She states that he has fallen three times since last night. In the emergency room he was found to have both a urinary tract infection and an elevated ammonia level. He has no prior history of hepatic encephalopathy  Family / Caregiver Present: No  Referring Practitioner: Blas  Diagnosis: Hepatic enceph and cirrhosis from ETOH  Subjective  Subjective: Pt met BS, in bed. Pt agreeable to OT for OOB to chair. Pt without complaints. Pt seen with PT for co-tx. General Comment  Comments: ok to see per nursing. Pt's BP earlier, 125/70      Orientation- WFL-pt naming place, date, year. Off by one day for exact date.      Objective        ADL's-Max A to straighten footies, doff/aimee L footie     Standing Balance  Time: 1 min  Activity: Pt amb in room, bed to recliner, 20 ft, with CG/Min A x2, with cues for safety, use of RW, hand placment. Pt alert until nearing chair, then less engaging and required increased assist for guiding walker, placing hand back to chair(kept ahold of walker with L hand while going to sit onto chair). Pt LE's elevated in chair. Comment: sit><stands with CG/Min a x1-2  Wheelchair Bed Transfers  Wheelchair/Bed - Technique: Ambulating  Equipment Used: Bed(recliner)  Level of Asssistance: Contact guard assistance;Minimal assistance;2 Person assistance  Wheelchair Transfers Comments: amb around bed to chair, with RW. Cues for safe hand placement  Bed mobility  Supine to Sit: Stand by assistance(HOB slightly raised, and use of rail)  Sit to Supine: Unable to assess(up to chair)         Cognition  Overall Cognitive Status: Exceptions  Arousal/Alertness: Delayed responses to stimuli  Following Commands: Follows one step commands with repetition  Attention Span: Difficulty attending to directions  Memory: Decreased recall of biographical Information;Decreased short term memory  Safety Judgement: Decreased awareness of need for assistance;Decreased awareness of need for safety  Insights: Decreased awareness of deficits  Initiation: Requires cues for some  Sequencing: Requires cues for some           14:23: returned to room, as pt requesting to use commode. Rosalinda Feldman stedy used for transfer, recliner ><commode in BR. PCA present for recliner to commode transfer (assisted x1 back to recliner with alex stedy). Pt without complaints, agreeable to OT. ADL's-Pt dependent for toileting needs:  Use of alex stedy for standing balance. Pt CGA in stance and dep for cleaning, depends changed after loose BM. Bathing- Pt required set up and cues for UB and Max/dep for LB, seated from commode, reach to shins, dep feet and dep post in stance on alex stedy. Dressing-assist to change hospital monitor gown.    Max/dep for don of footies and dep depends donned. Transfers-sit><stands at recliner, commode with Min A x1 and cues for hand placement. Dep between transfers, as stated above, with use of alex stedy lift. Safety-Pt remained up in recliner, needs in reach and chair alarm on. Assessment: Pt without complaints of lightheaded this session and tolerated session fairly well. Pt continues to be fall risk and not safe for d/c home. Continue poc.                   Plan   Plan  Times per week: 3-5  Times per day: Daily  Plan weeks: 1-2  Current Treatment Recommendations: Patient/Caregiver Education & Training, Cognitive Reorientation, Balance Training, Self-Care / ADL, Functional Mobility Training, Safety Education & Training, Endurance Training    AM-PAC Score        AM-PAC Inpatient Daily Activity Raw Score: 14  AM-PAC Inpatient ADL T-Scale Score : 33.39  ADL Inpatient CMS 0-100% Score: 59.67  ADL Inpatient CMS G-Code Modifier : CK    Goals  Short term goals  Time Frame for Short term goals: Status: goals ongoing  Short term goal 1: SBA UE ADLs  Short term goal 2: Min/mod assist LE ADLs  Short term goal 3: Min assist bed mobility  Short term goal 4: Min assist functional transfers to bed, chair and toilet with safe RW technique  Short term goal 5: Min assist functional mobility at RW level with 1-2 verbal cues for walker safety  Long term goals  Time Frame for Long term goals : TBD  Patient Goals   Patient goals : None formulated at this session       Therapy Time   Individual Concurrent Group Co-treatment   Time In 1329         Time Out 1352         Minutes 23              Therapy Time     Individual Co-treatment   Time In 1423     Time Out 1450     Minutes 27       Edgardo WOODS/L,[id number]  This note to serve as discharge summary if pt d/c'd prior to next session

## 2019-05-29 NOTE — PROGRESS NOTES
Patient resting in bed at this time. Patient denies pain, nausea, and/or vomiting. Patient also denies needs. IV clean, dry, and intact. Fall precautions in place. Will continue to monitor.

## 2019-05-29 NOTE — CARE COORDINATION
Patient discharged; unable to reach wife. Spoke with patient who prefers [facility]. Arranged for transfer at 5:30 via [id number]. Notified son, Maira Powell. Hens completed. Report to [phone].    Electronically signed by Sirisha Villagomez on 5/29/2019 at 5:25 PM   #[id number]

## 2019-05-29 NOTE — PROGRESS NOTES
has a past surgical history that includes Upper gastrointestinal endoscopy; Colonoscopy; Upper gastrointestinal endoscopy (01/04/2018); and Colonoscopy (08/22/2018). Restrictions  Restrictions/Precautions  Restrictions/Precautions: Fall Risk  Position Activity Restriction  Other position/activity restrictions: Will  Vision/Hearing        Subjective  General  Chart Reviewed: Yes  Patient assessed for rehabilitation services?: Yes  Additional Pertinent Hx: here due to multiple falls at home - PMH is extensive including alcoholism       Response To Previous Treatment: Patient with no complaints from previous session. Family / Caregiver Present: No  Subjective  Subjective: Arrived in room with pt in bed. Agreeable to PT/OT session. Pain Screening  Patient Currently in Pain: Denies    Social/Functional History  Social/Functional History  Lives With: Spouse  Type of Home: House  Home Layout: One level  Home Access: Stairs to enter with rails  Entrance Stairs - Number of Steps: 8-10 Flight of steps enterring through basement  Entrance Stairs - Rails: Left  Bathroom Shower/Tub: Walk-in shower  Bathroom Toilet: Handicap height  Bathroom Equipment: Shower chair(with back)  Home Equipment: Rolling walker, Cane(does not use the walker unless \"Going downstairs\")  ADL Assistance: Needs assistance(Assist for bathing from wife)  Homemaking Responsibilities: No  Ambulation Assistance: Needs assistance(wife assisted)  Transfer Assistance: Needs assistance  Active : No(Pt reports he drives but sister reports he does not anymore)  Occupation: Retired  Type of occupation: Real estate atttorney  Leisure & Hobbies: Watch tv  Additional Comments: Wife is home \"most of the time\". Pt is not a relieable historian according to his sister who is present.      Objective  Bed mobility  Supine to Sit: Stand by assistance(HOB elevated and use for rail)  Sit to Supine: Unable to assess(Pt up in chair at end of session)  Transfers  Sit to

## 2019-05-29 NOTE — PROGRESS NOTES
PCA went to check on patient and noted that the patient was holding something in his hand. When asked what the item was, the patient stated that it was \"a part of his tooth\". The patient set the tooth on his bed side table. PCA alerted RN. RN examined patient mouth and noted that the back half of one of the patient's teeth on the lower right side of his mouth fell off. Multiple teeth in this area are decaying. Patient states that \"this happens all the time\" and is \"no big deal\". Patient denies pain and there is no bleeding occurring in the mouth at this time. Dr. Odette Mcarthur alerted. No new orders at this time.

## 2019-05-29 NOTE — PROGRESS NOTES
Data- discharge order received, pt verbalized agreement to discharge, disposition to previous residence, no needs for HHC/DME. Action- discharge instructions prepared and given to patient and transport team, pt verbalized understanding. Medication information packet given r/t NEW and/or CHANGED prescriptions emphasizing name/purpose/side effects, pt verbalized understanding. Discharge instruction summary: Diet- General, Activity- steady x2, Primary Care Physician as follows: Flores Mercado -[phone] f/u appointment not scheduled at this time, immunizations reviewed, prescription medications sent with transport team.     Response- Pt belongings gathered, IV removed. Disposition is [facility] ,[facility] with [facility]. No complications. Patient wife aware of d/c to [facility] and states that she will visit the patient later tonight to bring him clothing/ personal belongings he may need.

## 2019-05-29 NOTE — DISCHARGE SUMMARY
Hospitalist Discharge Summary     Lazarus Dudley  : 1950  MRN: [id number]    Admit date: 2019  Discharge date: 2019    Admitting Physician: Maureen Clay MD    Discharge Diagnoses:   Patient Active Problem List   Diagnosis    [facility]    Hyperammonemia ([facility]    Alcoholic cirrhosis ([facility]    Dementia    Thrombocytopenia ([facility]    Hypokalemia    Hypomagnesemia    Hyperbilirubinemia    Urinary tract infection associated with cystostomy catheter (HCC)    Generalized weakness    Paroxysmal atrial fibrillation (HCC)       Admission Condition: poor    Discharged Condition: stable    Discharge Exam:  VITALS:  /70   Pulse 87   Temp 98.9 °F (37.2 °C) (Oral)   Resp 16   Ht 6' (1.829 m)   Wt 152 lb 1.9 oz (69 kg)   SpO2 94%   BMI 20.63 kg/m²   CONSTITUTIONAL:  awake, alert, cooperative, no apparent distress, and appears stated age  EYES:  Lids and lashes normal, PERRL, EOMI, sclera clear, conjunctiva normal  ENT:  NC/AT, MMM    NECK:  Supple, symmetrical, trachea midline, no adenopathy  HEMATOLOGIC/LYMPHATICS:  no cervical, supraclavicular or axillary lymphadenopathy  LUNGS:  clear to auscultation bilaterally, No increased work of breathing, good air exchange, no crackles or wheezing  CARDIOVASCULAR:  Regular rate and rhythm, normal S1 and S2, no S3 or S4, and no significant murmurs, rubs or gallops noted. Normal apical impulse,   ABDOMEN:  Normal active bowel sounds, soft, non-tender, non-distended, no masses palpated, no organomegally  MUSCULOSKELETAL:  Full range of motion noted. NEUROLOGIC:  Awake, alert, oriented to name, place and time. Cranial nerves II-XII are grossly intact. SKIN:  normal skin color, texture, turgor for age. Hospital Course:     1. Hepatic encephalopathy in the setting of alcoholic cirrhosis and, hyperammonemia, treated with Lactulose and Rifaximin. UTI contributed to his confusion.  Improvement in mental status, now likely at baseline. 2.Hyperbilirubinemia, appears chronic. GI consult appreciated  3. Thrombocytopenia, secondary to splenomegaly in the setting of liver cirrhosis. 4. Urinary tract infection associated with cystostomy catheter (HCC), was started on Zosyn empirically. Urine culture growing Acinetobacter baumannii/haemolyticus and Serratia fonticola. Changed antibiotic to Cipro for discharge  5. Acute Metabolic Encephalopathy due to UTI superimposed on hepatic encephalopathy - Improved. Now likely at baseline  6 Hypokalemia - Potassium replaced again today. Will discharge on 40 mEq bid with daily labs, recommendation to adjust dose until a stable dose is determined   7. Hypomagnesemia - replaced Magnesium. Given the diarrhea due to Lactulose, I'll discharge him on Magnesium supplements  8. Generalized weakness with multiple falls - discharge to SNF  9. Paroxysmal atrial fibrillation (HCC), currently rate controlled; continue Amiodarone and Nadolol  10. Dementia, confusion initially worse than baseline, now likely at baseline  11. Borderline hypotension, expected with liver disease, does not look septic         Consults: GI    Imaging Studies:  Xr Pelvis (1-2 Views)    Result Date: 5/24/2019  EXAMINATION: ONE XRAY VIEW OF THE PELVIS 5/24/2019 1:01 pm COMPARISON: 07/07/2006 radiograph HISTORY: ORDERING SYSTEM PROVIDED HISTORY: fall TECHNOLOGIST PROVIDED HISTORY: Reason for exam:->fall Ordering Physician Provided Reason for Exam: dizzy multiple falls Acuity: Acute Type of Exam: Initial FINDINGS: No acute fracture or dislocation in the pelvis. Hip joints are intact within limitations of this single AP view. There are degenerative osteophytes in the bilateral hips. No significant soft tissue finding. No acute finding in the pelvis. Degenerative changes slightly progressed in the bilateral hips.      Ct Head Wo Contrast    Result Date: 5/24/2019  EXAMINATION: CT OF THE HEAD WITHOUT CONTRAST  5/24/2019 1:02 pm TECHNIQUE: CT described above    Disposition: SNF    Discharge Medications:     Sandy Wing   Home Medication Instructions [id number]    Printed on:05/29/19 1221   Medication Information                      amiodarone (CORDARONE) 200 MG tablet  Take 200 mg by mouth daily             ciprofloxacin (CIPRO) 500 MG tablet  Take 1 tablet by mouth 2 times daily for 5 days             DULoxetine (CYMBALTA) 30 MG extended release capsule  Take 30 mg by mouth daily             folic acid (FOLVITE) 104 MCG tablet  1 tablet by mouth daily             lactulose (CHRONULAC) 10 GM/15ML solution  Take 45 mLs by mouth 2 times daily             lansoprazole (PREVACID) 30 MG delayed release capsule  Take 30 mg by mouth daily as needed (heartburn)              magnesium chloride (MAG DELAY) 535 (64 Mg) MG TBCR extended release tablet  Take 64 mg by mouth daily              miconazole (MICOTIN) 2 % powder  Apply topically 2 times daily Apply topically 2 times daily to scrotum             mupirocin (BACTROBAN) 2 % cream  Apply topically daily as needed (redness to suprapubic catheter insertion area) Apply topically 3 times daily. nadolol (CORGARD) 20 MG tablet  Take 20 mg by mouth daily              potassium chloride (KLOR-CON M) 20 MEQ extended release tablet  Take 2 tablets by mouth 2 times daily             rifaximin (XIFAXAN) 550 MG tablet  Take 1 tablet by mouth 2 times daily             tamsulosin (FLOMAX) 0.4 MG capsule  Take 0.4 mg by mouth nightly             torsemide (DEMADEX) 20 MG tablet  TAKE 1 TABLET BY MOUTH ONE TIME A DAY              vitamin B-1 (THIAMINE) 100 MG tablet  Take 100 mg by mouth daily                 35 Minutes spent on patient evaluation, counseling and discharge planning.      Signed:  Franck Erci MD  5/29/2019, 12:21 PM

## 2019-05-29 NOTE — PROGRESS NOTES
Report called to ASHISH Schmidt at [facility]. All questions posed were answered. Roxana states that the facility is prepared to accept the patient at this time. Call back number left in case any other questions arise. Patient and facility aware of 1730 transport time.

## 2019-05-29 NOTE — PLAN OF CARE
interventions. Will continue to monitor. Goal: Control of chronic pain  Description  Control of chronic pain  Outcome: Ongoing  Note:   Patient pain level controlled with pharmacologic and non-pharmacologic interventions. Will continue to monitor.

## 2019-07-02 ENCOUNTER — HOSPITAL ENCOUNTER (INPATIENT)
Age: 69
LOS: 4 days | Discharge: NURSING, REHAB OR LONG-TERM CARE | DRG: 433 | End: 2019-07-06
Attending: HOSPITALIST | Admitting: HOSPITALIST
Payer: MEDICARE

## 2019-07-02 DIAGNOSIS — K76.82 HEPATIC ENCEPHALOPATHY: ICD-10-CM

## 2019-07-02 DIAGNOSIS — N39.0 URINARY TRACT INFECTION ASSOCIATED WITH CATHETERIZATION OF URINARY TRACT, UNSPECIFIED INDWELLING URINARY CATHETER TYPE, INITIAL ENCOUNTER (HCC): ICD-10-CM

## 2019-07-02 DIAGNOSIS — T83.511A URINARY TRACT INFECTION ASSOCIATED WITH CATHETERIZATION OF URINARY TRACT, UNSPECIFIED INDWELLING URINARY CATHETER TYPE, INITIAL ENCOUNTER (HCC): ICD-10-CM

## 2019-07-02 DIAGNOSIS — R41.82 ALTERED MENTAL STATUS, UNSPECIFIED ALTERED MENTAL STATUS TYPE: Primary | ICD-10-CM

## 2019-07-02 LAB
A/G RATIO: 0.6 (ref 1.1–2.2)
ALBUMIN SERPL-MCNC: 2.3 G/DL (ref 3.4–5)
ALP BLD-CCNC: 101 U/L (ref 40–129)
ALT SERPL-CCNC: 21 U/L (ref 10–40)
AMMONIA: 117 UMOL/L (ref 16–60)
ANION GAP SERPL CALCULATED.3IONS-SCNC: 8 MMOL/L (ref 3–16)
ANISOCYTOSIS: ABNORMAL
APTT: 41.3 SEC (ref 26–36)
AST SERPL-CCNC: 46 U/L (ref 15–37)
BACTERIA: ABNORMAL /HPF
BASOPHILS ABSOLUTE: 0.1 K/UL (ref 0–0.2)
BASOPHILS RELATIVE PERCENT: 2 %
BILIRUB SERPL-MCNC: 2 MG/DL (ref 0–1)
BILIRUBIN URINE: NEGATIVE
BLOOD, URINE: ABNORMAL
BUN BLDV-MCNC: 16 MG/DL (ref 7–20)
CALCIUM SERPL-MCNC: 9.3 MG/DL (ref 8.3–10.6)
CHLORIDE BLD-SCNC: 110 MMOL/L (ref 99–110)
CLARITY: ABNORMAL
CO2: 27 MMOL/L (ref 21–32)
COLOR: YELLOW
COMMENT UA: ABNORMAL
CREAT SERPL-MCNC: 1.4 MG/DL (ref 0.8–1.3)
EOSINOPHILS ABSOLUTE: 0 K/UL (ref 0–0.6)
EOSINOPHILS RELATIVE PERCENT: 1 %
EPITHELIAL CELLS, UA: 0 /HPF (ref 0–5)
GFR AFRICAN AMERICAN: >60
GFR NON-AFRICAN AMERICAN: 50
GLOBULIN: 3.9 G/DL
GLUCOSE BLD-MCNC: 111 MG/DL (ref 70–99)
GLUCOSE URINE: NEGATIVE MG/DL
HCT VFR BLD CALC: 27 % (ref 40.5–52.5)
HEMOGLOBIN: 9.1 G/DL (ref 13.5–17.5)
HYALINE CASTS: 3 /LPF (ref 0–8)
INR BLD: 1.42 (ref 0.86–1.14)
KETONES, URINE: NEGATIVE MG/DL
LACTIC ACID: 2.5 MMOL/L (ref 0.4–2)
LEUKOCYTE ESTERASE, URINE: ABNORMAL
LYMPHOCYTES ABSOLUTE: 1.3 K/UL (ref 1–5.1)
LYMPHOCYTES RELATIVE PERCENT: 28 %
MAGNESIUM: 2.1 MG/DL (ref 1.8–2.4)
MCH RBC QN AUTO: 36.3 PG (ref 26–34)
MCHC RBC AUTO-ENTMCNC: 33.6 G/DL (ref 31–36)
MCV RBC AUTO: 108.1 FL (ref 80–100)
MICROSCOPIC EXAMINATION: YES
MONOCYTES ABSOLUTE: 0.6 K/UL (ref 0–1.3)
MONOCYTES RELATIVE PERCENT: 14 %
NEUTROPHILS ABSOLUTE: 2.5 K/UL (ref 1.7–7.7)
NEUTROPHILS RELATIVE PERCENT: 55 %
NITRITE, URINE: NEGATIVE
PDW BLD-RTO: 17.4 % (ref 12.4–15.4)
PH UA: 6.5 (ref 5–8)
PLATELET # BLD: 77 K/UL (ref 135–450)
PMV BLD AUTO: 9.1 FL (ref 5–10.5)
POTASSIUM REFLEX MAGNESIUM: 3.4 MMOL/L (ref 3.5–5.1)
PROTEIN UA: ABNORMAL MG/DL
PROTHROMBIN TIME: 16.2 SEC (ref 9.8–13)
RBC # BLD: 2.5 M/UL (ref 4.2–5.9)
RBC UA: 9 /HPF (ref 0–4)
SODIUM BLD-SCNC: 145 MMOL/L (ref 136–145)
SPECIFIC GRAVITY UA: 1.01 (ref 1–1.03)
TOTAL PROTEIN: 6.2 G/DL (ref 6.4–8.2)
URINE REFLEX TO CULTURE: YES
URINE TYPE: ABNORMAL
UROBILINOGEN, URINE: 1 E.U./DL
WBC # BLD: 4.6 K/UL (ref 4–11)
WBC UA: 227 /HPF (ref 0–5)

## 2019-07-02 PROCEDURE — 99285 EMERGENCY DEPT VISIT HI MDM: CPT

## 2019-07-02 PROCEDURE — 96365 THER/PROPH/DIAG IV INF INIT: CPT

## 2019-07-02 PROCEDURE — 81001 URINALYSIS AUTO W/SCOPE: CPT

## 2019-07-02 PROCEDURE — 85025 COMPLETE CBC W/AUTO DIFF WBC: CPT

## 2019-07-02 PROCEDURE — 2580000003 HC RX 258: Performed by: PHYSICIAN ASSISTANT

## 2019-07-02 PROCEDURE — 83735 ASSAY OF MAGNESIUM: CPT

## 2019-07-02 PROCEDURE — 2580000003 HC RX 258: Performed by: HOSPITALIST

## 2019-07-02 PROCEDURE — 6370000000 HC RX 637 (ALT 250 FOR IP): Performed by: PHYSICIAN ASSISTANT

## 2019-07-02 PROCEDURE — 6360000002 HC RX W HCPCS: Performed by: PHYSICIAN ASSISTANT

## 2019-07-02 PROCEDURE — 87077 CULTURE AEROBIC IDENTIFY: CPT

## 2019-07-02 PROCEDURE — 87186 SC STD MICRODIL/AGAR DIL: CPT

## 2019-07-02 PROCEDURE — 83605 ASSAY OF LACTIC ACID: CPT

## 2019-07-02 PROCEDURE — 87086 URINE CULTURE/COLONY COUNT: CPT

## 2019-07-02 PROCEDURE — 2060000000 HC ICU INTERMEDIATE R&B

## 2019-07-02 PROCEDURE — 85730 THROMBOPLASTIN TIME PARTIAL: CPT

## 2019-07-02 PROCEDURE — 80053 COMPREHEN METABOLIC PANEL: CPT

## 2019-07-02 PROCEDURE — 87040 BLOOD CULTURE FOR BACTERIA: CPT

## 2019-07-02 PROCEDURE — 85610 PROTHROMBIN TIME: CPT

## 2019-07-02 PROCEDURE — 82140 ASSAY OF AMMONIA: CPT

## 2019-07-02 RX ORDER — AMIODARONE HYDROCHLORIDE 200 MG/1
200 TABLET ORAL DAILY
Status: DISCONTINUED | OUTPATIENT
Start: 2019-07-03 | End: 2019-07-06 | Stop reason: HOSPADM

## 2019-07-02 RX ORDER — TAMSULOSIN HYDROCHLORIDE 0.4 MG/1
0.4 CAPSULE ORAL NIGHTLY
Status: DISCONTINUED | OUTPATIENT
Start: 2019-07-02 | End: 2019-07-06 | Stop reason: HOSPADM

## 2019-07-02 RX ORDER — LACTULOSE 10 G/15ML
30 SOLUTION ORAL 2 TIMES DAILY
Status: DISCONTINUED | OUTPATIENT
Start: 2019-07-02 | End: 2019-07-06 | Stop reason: HOSPADM

## 2019-07-02 RX ORDER — SODIUM CHLORIDE 9 MG/ML
INJECTION, SOLUTION INTRAVENOUS CONTINUOUS
Status: DISCONTINUED | OUTPATIENT
Start: 2019-07-02 | End: 2019-07-06 | Stop reason: HOSPADM

## 2019-07-02 RX ORDER — DULOXETIN HYDROCHLORIDE 30 MG/1
30 CAPSULE, DELAYED RELEASE ORAL DAILY
Status: DISCONTINUED | OUTPATIENT
Start: 2019-07-03 | End: 2019-07-06 | Stop reason: HOSPADM

## 2019-07-02 RX ORDER — TORSEMIDE 20 MG/1
20 TABLET ORAL DAILY
Status: DISCONTINUED | OUTPATIENT
Start: 2019-07-03 | End: 2019-07-02

## 2019-07-02 RX ORDER — FAMOTIDINE 20 MG/1
20 TABLET, FILM COATED ORAL 2 TIMES DAILY
Status: DISCONTINUED | OUTPATIENT
Start: 2019-07-02 | End: 2019-07-06 | Stop reason: HOSPADM

## 2019-07-02 RX ORDER — SODIUM CHLORIDE 0.9 % (FLUSH) 0.9 %
10 SYRINGE (ML) INJECTION EVERY 12 HOURS SCHEDULED
Status: DISCONTINUED | OUTPATIENT
Start: 2019-07-02 | End: 2019-07-06 | Stop reason: HOSPADM

## 2019-07-02 RX ORDER — POTASSIUM CHLORIDE 20 MEQ/1
40 TABLET, EXTENDED RELEASE ORAL PRN
Status: DISCONTINUED | OUTPATIENT
Start: 2019-07-02 | End: 2019-07-06 | Stop reason: HOSPADM

## 2019-07-02 RX ORDER — POTASSIUM CHLORIDE 750 MG/1
50 TABLET, FILM COATED, EXTENDED RELEASE ORAL 2 TIMES DAILY
Status: DISCONTINUED | OUTPATIENT
Start: 2019-07-02 | End: 2019-07-06 | Stop reason: HOSPADM

## 2019-07-02 RX ORDER — POTASSIUM CHLORIDE 7.45 MG/ML
10 INJECTION INTRAVENOUS PRN
Status: DISCONTINUED | OUTPATIENT
Start: 2019-07-02 | End: 2019-07-06 | Stop reason: HOSPADM

## 2019-07-02 RX ORDER — POTASSIUM CHLORIDE 750 MG/1
50 CAPSULE, EXTENDED RELEASE ORAL 2 TIMES DAILY
Status: ON HOLD | COMMUNITY
End: 2019-07-17

## 2019-07-02 RX ORDER — THIAMINE MONONITRATE (VIT B1) 100 MG
100 TABLET ORAL DAILY
Status: DISCONTINUED | OUTPATIENT
Start: 2019-07-03 | End: 2019-07-06 | Stop reason: HOSPADM

## 2019-07-02 RX ORDER — SODIUM CHLORIDE 0.9 % (FLUSH) 0.9 %
10 SYRINGE (ML) INJECTION PRN
Status: DISCONTINUED | OUTPATIENT
Start: 2019-07-02 | End: 2019-07-06 | Stop reason: HOSPADM

## 2019-07-02 RX ORDER — NADOLOL 40 MG/1
20 TABLET ORAL DAILY
Status: DISCONTINUED | OUTPATIENT
Start: 2019-07-03 | End: 2019-07-06 | Stop reason: HOSPADM

## 2019-07-02 RX ORDER — ONDANSETRON 2 MG/ML
4 INJECTION INTRAMUSCULAR; INTRAVENOUS EVERY 6 HOURS PRN
Status: DISCONTINUED | OUTPATIENT
Start: 2019-07-02 | End: 2019-07-06 | Stop reason: HOSPADM

## 2019-07-02 RX ORDER — OMEPRAZOLE 20 MG/1
20 CAPSULE, DELAYED RELEASE ORAL NIGHTLY
Status: ON HOLD | COMMUNITY
End: 2019-08-19 | Stop reason: HOSPADM

## 2019-07-02 RX ADMIN — SODIUM CHLORIDE: 9 INJECTION, SOLUTION INTRAVENOUS at 20:31

## 2019-07-02 RX ADMIN — CEFTRIAXONE 1 G: 1 INJECTION, POWDER, FOR SOLUTION INTRAMUSCULAR; INTRAVENOUS at 17:01

## 2019-07-02 RX ADMIN — LACTULOSE: 10 SOLUTION ORAL at 20:30

## 2019-07-02 ASSESSMENT — PAIN SCALES - GENERAL
PAINLEVEL_OUTOF10: 10
PAINLEVEL_OUTOF10: 0

## 2019-07-02 ASSESSMENT — ENCOUNTER SYMPTOMS
DIARRHEA: 0
VOMITING: 0

## 2019-07-02 ASSESSMENT — PAIN DESCRIPTION - PAIN TYPE: TYPE: ACUTE PAIN

## 2019-07-02 NOTE — ED NOTES
Fall risk screening completed. Fall risk bracelet applied to patient. Non-skid socks provided and placed on patient. The fall risk is indicated using  dome light . Based on score, a bed alarm was indicated and applied. The call light is within the patient's reach, and instructions for use were provided. The bed is in the lowest position with wheels locked. The patient has been advised to notify staff, using the call light, if there is a need to get up or use restroom.   The patient verbalized understanding of safety precautions and how to contact staff for assistance. ;miguel angel Mcqueen RN  07/02/19 [phone]

## 2019-07-02 NOTE — ED NOTES
Bed: B-08  Expected date: 7/2/19  Expected time: 2:50 PM  Means of arrival: [facility]  Comments:  69M henry, kodi Baxter RN  07/02/19 1107
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
## 2019-07-03 LAB
AMMONIA: 72 UMOL/L (ref 16–60)
ANION GAP SERPL CALCULATED.3IONS-SCNC: 10 MMOL/L (ref 3–16)
BUN BLDV-MCNC: 15 MG/DL (ref 7–20)
CALCIUM SERPL-MCNC: 9.2 MG/DL (ref 8.3–10.6)
CHLORIDE BLD-SCNC: 116 MMOL/L (ref 99–110)
CO2: 23 MMOL/L (ref 21–32)
CREAT SERPL-MCNC: 1.1 MG/DL (ref 0.8–1.3)
GFR AFRICAN AMERICAN: >60
GFR NON-AFRICAN AMERICAN: >60
GLUCOSE BLD-MCNC: 98 MG/DL (ref 70–99)
HCT VFR BLD CALC: 27.1 % (ref 40.5–52.5)
HEMOGLOBIN: 9.1 G/DL (ref 13.5–17.5)
MAGNESIUM: 2 MG/DL (ref 1.8–2.4)
MCH RBC QN AUTO: 36.4 PG (ref 26–34)
MCHC RBC AUTO-ENTMCNC: 33.7 G/DL (ref 31–36)
MCV RBC AUTO: 108 FL (ref 80–100)
PDW BLD-RTO: 17.3 % (ref 12.4–15.4)
PLATELET # BLD: 76 K/UL (ref 135–450)
PMV BLD AUTO: 9.2 FL (ref 5–10.5)
POTASSIUM REFLEX MAGNESIUM: 2.9 MMOL/L (ref 3.5–5.1)
POTASSIUM SERPL-SCNC: 3.2 MMOL/L (ref 3.5–5.1)
RBC # BLD: 2.51 M/UL (ref 4.2–5.9)
SODIUM BLD-SCNC: 149 MMOL/L (ref 136–145)
WBC # BLD: 3.7 K/UL (ref 4–11)

## 2019-07-03 PROCEDURE — 6360000002 HC RX W HCPCS: Performed by: HOSPITALIST

## 2019-07-03 PROCEDURE — 85027 COMPLETE CBC AUTOMATED: CPT

## 2019-07-03 PROCEDURE — 83735 ASSAY OF MAGNESIUM: CPT

## 2019-07-03 PROCEDURE — 94760 N-INVAS EAR/PLS OXIMETRY 1: CPT

## 2019-07-03 PROCEDURE — 2580000003 HC RX 258: Performed by: NURSE PRACTITIONER

## 2019-07-03 PROCEDURE — 36415 COLL VENOUS BLD VENIPUNCTURE: CPT

## 2019-07-03 PROCEDURE — 2060000000 HC ICU INTERMEDIATE R&B

## 2019-07-03 PROCEDURE — 84132 ASSAY OF SERUM POTASSIUM: CPT

## 2019-07-03 PROCEDURE — 6370000000 HC RX 637 (ALT 250 FOR IP): Performed by: NURSE PRACTITIONER

## 2019-07-03 PROCEDURE — 2580000003 HC RX 258: Performed by: HOSPITALIST

## 2019-07-03 PROCEDURE — 6370000000 HC RX 637 (ALT 250 FOR IP): Performed by: HOSPITALIST

## 2019-07-03 PROCEDURE — 80048 BASIC METABOLIC PNL TOTAL CA: CPT

## 2019-07-03 PROCEDURE — 82140 ASSAY OF AMMONIA: CPT

## 2019-07-03 RX ADMIN — SODIUM CHLORIDE: 9 INJECTION, SOLUTION INTRAVENOUS at 06:57

## 2019-07-03 RX ADMIN — POTASSIUM CHLORIDE 10 MEQ: 7.46 INJECTION, SOLUTION INTRAVENOUS at 10:57

## 2019-07-03 RX ADMIN — POTASSIUM CHLORIDE 10 MEQ: 7.46 INJECTION, SOLUTION INTRAVENOUS at 11:58

## 2019-07-03 RX ADMIN — SODIUM CHLORIDE, PRESERVATIVE FREE 10 ML: 5 INJECTION INTRAVENOUS at 21:39

## 2019-07-03 RX ADMIN — LACTULOSE 30 G: 20 SOLUTION ORAL at 09:56

## 2019-07-03 RX ADMIN — FAMOTIDINE 20 MG: 20 TABLET ORAL at 09:56

## 2019-07-03 RX ADMIN — Medication 100 MG: at 09:56

## 2019-07-03 RX ADMIN — LACTULOSE: 10 SOLUTION ORAL at 00:22

## 2019-07-03 RX ADMIN — LACTULOSE: 10 SOLUTION ORAL at 04:33

## 2019-07-03 RX ADMIN — DULOXETINE HYDROCHLORIDE 30 MG: 30 CAPSULE, DELAYED RELEASE ORAL at 09:56

## 2019-07-03 RX ADMIN — NADOLOL 20 MG: 40 TABLET ORAL at 09:56

## 2019-07-03 RX ADMIN — RIFAXIMIN 550 MG: 550 TABLET ORAL at 21:30

## 2019-07-03 RX ADMIN — AMIODARONE HYDROCHLORIDE 200 MG: 200 TABLET ORAL at 09:56

## 2019-07-03 RX ADMIN — POTASSIUM CHLORIDE 10 MEQ: 7.46 INJECTION, SOLUTION INTRAVENOUS at 13:30

## 2019-07-03 RX ADMIN — RIFAXIMIN 550 MG: 550 TABLET ORAL at 09:56

## 2019-07-03 RX ADMIN — FAMOTIDINE 20 MG: 20 TABLET ORAL at 21:30

## 2019-07-03 RX ADMIN — POTASSIUM CHLORIDE 10 MEQ: 7.46 INJECTION, SOLUTION INTRAVENOUS at 08:16

## 2019-07-03 RX ADMIN — LACTULOSE 30 G: 20 SOLUTION ORAL at 21:30

## 2019-07-03 RX ADMIN — TAMSULOSIN HYDROCHLORIDE 0.4 MG: 0.4 CAPSULE ORAL at 21:30

## 2019-07-03 RX ADMIN — Medication 1 TABLET: at 09:56

## 2019-07-03 RX ADMIN — POTASSIUM CHLORIDE 10 MEQ: 7.46 INJECTION, SOLUTION INTRAVENOUS at 06:57

## 2019-07-03 RX ADMIN — ENOXAPARIN SODIUM 40 MG: 40 INJECTION SUBCUTANEOUS at 08:15

## 2019-07-03 RX ADMIN — POTASSIUM CHLORIDE 50 MEQ: 750 TABLET, FILM COATED, EXTENDED RELEASE ORAL at 21:30

## 2019-07-03 RX ADMIN — POTASSIUM CHLORIDE 10 MEQ: 7.46 INJECTION, SOLUTION INTRAVENOUS at 09:30

## 2019-07-03 ASSESSMENT — PAIN SCALES - GENERAL
PAINLEVEL_OUTOF10: 0

## 2019-07-03 NOTE — PROGRESS NOTES
4 Eyes Skin Assessment     The patient is being assess for  Admission    I agree that 2 RN's have performed a thorough Head to Toe Skin Assessment on the patient. ALL assessment sites listed below have been assessed. Areas assessed by both nurses:   [x]   Head, Face, and Ears   [x]   Shoulders, Back, and Chest  [x]   Arms, Elbows, and Hands   [x]   Coccyx, Sacrum, and IschIum  [x]   Legs, Feet, and Heels        Does the Patient have Skin Breakdown?   No         Julio Prevention initiated:  Yes   Wound Care Orders initiated:  NA      [facility] nurse consulted for Pressure Injury (Stage 3,4, Unstageable, DTI, NWPT, and Complex wounds), New and Established Ostomies:  NA      Nurse 1 eSignature: Electronically signed by Purnima Trinidad RN on 7/2/19 at 11:02 PM    **SHARE this note so that the co-signing nurse is able to place an eSignature**    Nurse 2 eSignature: Electronically signed by Christopher Dhillon RN on 7/3/2019 at 1:18 PM

## 2019-07-03 NOTE — PROGRESS NOTES
Pt is alert in bed. Pt passed the nursing swallow eval. PO medications given. Spoke with MD about lactulose enemas. See new orders. Will continue to monitor.

## 2019-07-03 NOTE — PROGRESS NOTES
Hospitalist Progress Note  7/3/2019 9:53 AM    PCP: Volodymyr Erazo MD    [id number]     Date of Admission: 7/2/2019                                                                                                                     HOSPITAL COURSE    Patient demographics:  The patient  Ashely Mcduffie is a 77 y.o. male     Significant past medical history:   Patient Active Problem List   Diagnosis    Hepatic encephalopathy ([address]    Hyperammonemia (HCC)    Alcoholic cirrhosis ([address]    Dementia    Thrombocytopenia (HCC)    Hypokalemia    Hypomagnesemia    Hyperbilirubinemia    Urinary tract infection associated with cystostomy catheter (HCC)    Generalized weakness    Paroxysmal atrial fibrillation ([address]         Presenting symptoms:  altered mental status        Diagnostic workup:      CONSULTS DURING ADMISSION :   IP CONSULT TO HOSPITALIST  IP CONSULT TO GI      Patient was diagnosed with:  Hepatic encephalopathy  Anxiety depression            Treatment while inpatient:                                                                                         ----------------------------------------------------------      SUBJECTIVE COMPLAINTS- follow-up for mental status change    Diet: Diet NPO Effective Now      OBJECTIVE:   Patient Active Problem List   Diagnosis    Hepatic encephalopathy ([address]    Hyperammonemia (HCC)    Alcoholic cirrhosis ([address]    Dementia    Thrombocytopenia (HCC)    Hypokalemia    Hypomagnesemia    Hyperbilirubinemia    Urinary tract infection associated with cystostomy catheter ([address]    Generalized weakness    Paroxysmal atrial fibrillation ([facility]       Allergies  Patient has no known allergies.     Medications    Scheduled Meds:   amiodarone  200 mg Oral Daily    DULoxetine  30 mg Oral Daily    lactulose  30 g Oral BID    magnesium cl-calcium carbonate  1 tablet Oral Daily    nadolol  20 mg Oral Daily    potassium chloride  50 mEq Oral BID    tamsulosin  0.4 mg

## 2019-07-03 NOTE — PLAN OF CARE
Problem: Falls - Risk of:  Goal: Will remain free from falls  Description  Will remain free from falls  Outcome: Ongoing  Goal: Absence of physical injury  Description  Absence of physical injury  Outcome: Ongoing     Problem: Confusion - Acute:  Goal: Absence of continued neurological deterioration signs and symptoms  Description  Absence of continued neurological deterioration signs and symptoms  Outcome: Ongoing  Goal: Mental status will be restored to baseline  Description  Mental status will be restored to baseline  Outcome: Ongoing     Problem: Discharge Planning:  Goal: Ability to perform activities of daily living will improve  Description  Ability to perform activities of daily living will improve  Outcome: Ongoing  Goal: Participates in care planning  Description  Participates in care planning  Outcome: Ongoing     Problem: Injury - Risk of, Physical Injury:  Goal: Will remain free from falls  Description  Will remain free from falls  Outcome: Ongoing  Goal: Absence of physical injury  Description  Absence of physical injury  Outcome: Ongoing     Problem: Mood - Altered:  Goal: Mood stable  Description  Mood stable  Outcome: Ongoing  Goal: Absence of abusive behavior  Description  Absence of abusive behavior  Outcome: Ongoing  Goal: Verbalizations of feeling emotionally comfortable while being cared for will increase  Description  Verbalizations of feeling emotionally comfortable while being cared for will increase  Outcome: Ongoing     Problem: Psychomotor Activity - Altered:  Goal: Absence of psychomotor disturbance signs and symptoms  Description  Absence of psychomotor disturbance signs and symptoms  Outcome: Ongoing     Problem: Sensory Perception - Impaired:  Goal: Demonstrations of improved sensory functioning will increase  Description  Demonstrations of improved sensory functioning will increase  Outcome: Ongoing  Goal: Decrease in sensory misperception frequency  Description  Decrease in sensory misperception frequency  Outcome: Ongoing  Goal: Able to refrain from responding to false sensory perceptions  Description  Able to refrain from responding to false sensory perceptions  Outcome: Ongoing  Goal: Demonstrates accurate environmental perceptions  Description  Demonstrates accurate environmental perceptions  Outcome: Ongoing  Goal: Able to distinguish between reality-based and nonreality-based thinking  Description  Able to distinguish between reality-based and nonreality-based thinking  Outcome: Ongoing  Goal: Able to interrupt nonreality-based thinking  Description  Able to interrupt nonreality-based thinking  Outcome: Ongoing     Problem: Sleep Pattern Disturbance:  Goal: Appears well-rested  Description  Appears well-rested  Outcome: Ongoing     Problem: Risk for Impaired Skin Integrity  Goal: Tissue integrity - skin and mucous membranes  Description  Structural intactness and normal physiological function of skin and  mucous membranes.   Outcome: Ongoing

## 2019-07-03 NOTE — PROGRESS NOTES
Patient receiving q4 lactulose enemas as ordered. Patient tolerating well. Patient becoming more alert as night goes on. Patient opens eyes to voices. Patient bed linens changed. Patient is resting quietly in bed. Will continue to monitor.

## 2019-07-04 LAB
AMMONIA: 30 UMOL/L (ref 16–60)
ANION GAP SERPL CALCULATED.3IONS-SCNC: 10 MMOL/L (ref 3–16)
BUN BLDV-MCNC: 12 MG/DL (ref 7–20)
CALCIUM SERPL-MCNC: 8.9 MG/DL (ref 8.3–10.6)
CHLORIDE BLD-SCNC: 115 MMOL/L (ref 99–110)
CO2: 21 MMOL/L (ref 21–32)
CREAT SERPL-MCNC: 1.1 MG/DL (ref 0.8–1.3)
GFR AFRICAN AMERICAN: >60
GFR NON-AFRICAN AMERICAN: >60
GLUCOSE BLD-MCNC: 93 MG/DL (ref 70–99)
HCT VFR BLD CALC: 26.9 % (ref 40.5–52.5)
HEMOGLOBIN: 8.9 G/DL (ref 13.5–17.5)
MCH RBC QN AUTO: 36.1 PG (ref 26–34)
MCHC RBC AUTO-ENTMCNC: 33.1 G/DL (ref 31–36)
MCV RBC AUTO: 109.1 FL (ref 80–100)
ORGANISM: ABNORMAL
PDW BLD-RTO: 17.4 % (ref 12.4–15.4)
PLATELET # BLD: 75 K/UL (ref 135–450)
PMV BLD AUTO: 9.2 FL (ref 5–10.5)
POTASSIUM SERPL-SCNC: 3.5 MMOL/L (ref 3.5–5.1)
POTASSIUM SERPL-SCNC: 4 MMOL/L (ref 3.5–5.1)
RBC # BLD: 2.47 M/UL (ref 4.2–5.9)
SODIUM BLD-SCNC: 146 MMOL/L (ref 136–145)
URINE CULTURE, ROUTINE: ABNORMAL
URINE CULTURE, ROUTINE: ABNORMAL
WBC # BLD: 3.5 K/UL (ref 4–11)

## 2019-07-04 PROCEDURE — 84132 ASSAY OF SERUM POTASSIUM: CPT

## 2019-07-04 PROCEDURE — 2580000003 HC RX 258: Performed by: HOSPITALIST

## 2019-07-04 PROCEDURE — 2060000000 HC ICU INTERMEDIATE R&B

## 2019-07-04 PROCEDURE — 80048 BASIC METABOLIC PNL TOTAL CA: CPT

## 2019-07-04 PROCEDURE — 82140 ASSAY OF AMMONIA: CPT

## 2019-07-04 PROCEDURE — 97530 THERAPEUTIC ACTIVITIES: CPT

## 2019-07-04 PROCEDURE — 97166 OT EVAL MOD COMPLEX 45 MIN: CPT

## 2019-07-04 PROCEDURE — 36415 COLL VENOUS BLD VENIPUNCTURE: CPT

## 2019-07-04 PROCEDURE — 6370000000 HC RX 637 (ALT 250 FOR IP): Performed by: HOSPITALIST

## 2019-07-04 PROCEDURE — 94760 N-INVAS EAR/PLS OXIMETRY 1: CPT

## 2019-07-04 PROCEDURE — 85027 COMPLETE CBC AUTOMATED: CPT

## 2019-07-04 PROCEDURE — 97162 PT EVAL MOD COMPLEX 30 MIN: CPT | Performed by: PHYSICAL THERAPIST

## 2019-07-04 PROCEDURE — 97530 THERAPEUTIC ACTIVITIES: CPT | Performed by: PHYSICAL THERAPIST

## 2019-07-04 PROCEDURE — 6360000002 HC RX W HCPCS: Performed by: HOSPITALIST

## 2019-07-04 RX ADMIN — CEFTRIAXONE 1 G: 1 INJECTION, POWDER, FOR SOLUTION INTRAMUSCULAR; INTRAVENOUS at 14:23

## 2019-07-04 RX ADMIN — AMIODARONE HYDROCHLORIDE 200 MG: 200 TABLET ORAL at 09:59

## 2019-07-04 RX ADMIN — POTASSIUM CHLORIDE 40 MEQ: 20 TABLET, EXTENDED RELEASE ORAL at 02:52

## 2019-07-04 RX ADMIN — RIFAXIMIN 550 MG: 550 TABLET ORAL at 22:39

## 2019-07-04 RX ADMIN — ONDANSETRON 4 MG: 2 INJECTION INTRAMUSCULAR; INTRAVENOUS at 14:25

## 2019-07-04 RX ADMIN — Medication 100 MG: at 09:59

## 2019-07-04 RX ADMIN — SODIUM CHLORIDE: 9 INJECTION, SOLUTION INTRAVENOUS at 00:43

## 2019-07-04 RX ADMIN — POTASSIUM CHLORIDE 50 MEQ: 750 TABLET, FILM COATED, EXTENDED RELEASE ORAL at 09:59

## 2019-07-04 RX ADMIN — Medication 1 TABLET: at 09:59

## 2019-07-04 RX ADMIN — POTASSIUM CHLORIDE 50 MEQ: 750 TABLET, FILM COATED, EXTENDED RELEASE ORAL at 22:38

## 2019-07-04 RX ADMIN — ENOXAPARIN SODIUM 40 MG: 40 INJECTION SUBCUTANEOUS at 09:59

## 2019-07-04 RX ADMIN — LACTULOSE 30 G: 20 SOLUTION ORAL at 22:39

## 2019-07-04 RX ADMIN — NADOLOL 20 MG: 40 TABLET ORAL at 09:59

## 2019-07-04 RX ADMIN — FAMOTIDINE 20 MG: 20 TABLET ORAL at 22:38

## 2019-07-04 RX ADMIN — LACTULOSE 30 G: 20 SOLUTION ORAL at 09:59

## 2019-07-04 RX ADMIN — SODIUM CHLORIDE: 9 INJECTION, SOLUTION INTRAVENOUS at 14:25

## 2019-07-04 RX ADMIN — FAMOTIDINE 20 MG: 20 TABLET ORAL at 10:05

## 2019-07-04 RX ADMIN — DULOXETINE HYDROCHLORIDE 30 MG: 30 CAPSULE, DELAYED RELEASE ORAL at 09:59

## 2019-07-04 RX ADMIN — RIFAXIMIN 550 MG: 550 TABLET ORAL at 09:59

## 2019-07-04 RX ADMIN — TAMSULOSIN HYDROCHLORIDE 0.4 MG: 0.4 CAPSULE ORAL at 22:38

## 2019-07-04 ASSESSMENT — PAIN SCALES - GENERAL: PAINLEVEL_OUTOF10: 0

## 2019-07-04 NOTE — PLAN OF CARE
Problem: Falls - Risk of:  Goal: Will remain free from falls  Description  Will remain free from falls  7/4/2019 0931 by Gonzalo Saba RN  Outcome: Ongoing     Problem: Falls - Risk of:  Goal: Absence of physical injury  Description  Absence of physical injury  7/4/2019 0931 by Gonzalo Saba RN  Outcome: Ongoing     Problem: Confusion - Acute:  Goal: Absence of continued neurological deterioration signs and symptoms  Description  Absence of continued neurological deterioration signs and symptoms  7/4/2019 0931 by Gonzalo Saba RN  Outcome: Ongoing     Problem: Injury - Risk of, Physical Injury:  Goal: Will remain free from falls  Description  Will remain free from falls  7/4/2019 0931 by Gonzalo Saba RN  Outcome: Ongoing     Problem: Injury - Risk of, Physical Injury:  Goal: Absence of physical injury  Description  Absence of physical injury  7/4/2019 0931 by Gonzalo Saba RN  Outcome: Ongoing     Problem: Mood - Altered:  Goal: Mood stable  Description  Mood stable  7/4/2019 0931 by Gonzalo Saba RN  Outcome: Ongoing  7/4/2019 0002 by Car Archuleta RN  Outcome: Ongoing     Problem: Mood - Altered:  Goal: Mood stable  Description  Mood stable  7/4/2019 0931 by Gonzalo Saba RN  Outcome: Ongoing   Patient remains free of falls or new physical injury, no new skin breakdown. Fall prevention and skin preservation measures in place. Patient is calm and cooperative, but withdrawn and slow to respond.

## 2019-07-04 NOTE — PROGRESS NOTES
Circulation problem, Dementia, Depressive disorder, GERD (gastroesophageal reflux disease), Hypomagnesemia, Hypopotassemia, SOB (shortness of breath), Spinal stenosis, Suicidal behavior, Syncope, Thrombocytopenia ([address] Traumatic brain injury ([address] Wears dentures, Wears glasses, and Wheezing. has a past surgical history that includes Upper gastrointestinal endoscopy; Colonoscopy; Upper gastrointestinal endoscopy (01/04/2018); and Colonoscopy (08/22/2018). Restrictions  Restrictions/Precautions  Restrictions/Precautions: Contact Precautions  Position Activity Restriction  Other position/activity restrictions: Telesitter  Vision/Hearing  Vision: Within Functional Limits  Hearing: Within functional limits     Subjective  General  Chart Reviewed: Yes  Patient assessed for rehabilitation services?: Yes  Additional Pertinent Hx: per ER note:  Arlette Powell is a 77 y.o. male who presents to the emergency department via EMS from the nursing home. Patient has been residing there for the past 3 weeks. He is accompanied by his wife. Wife states the patient has continued to drink alcohol until last month. He has history of alcoholic cirrhosis. He takes lactulose but over the past 3 days she states that he is not even been able to take the lactulose. She states that he could interact with her on Sunday night as he normally does and it has progressed to today when he does not even recognize her. She states this is abnormal for him. He has history of dementia. He has history of an indwelling Will catheter. Nursing facility checked his ammonia level yesterday and reported it was 200. However his confusion has increased today.   Response To Previous Treatment: Not applicable  Family / Caregiver Present: No  Follows Commands: Within Functional Limits(some delayed processing)  Subjective  Subjective: pt agreeable to working with therapy; some word finding issues; denies any pain  Pain Screening  Patient Currently in Pain: Denies          Orientation  Orientation  Overall Orientation Status: Impaired  Orientation Level: Oriented to person;Oriented to place(partial situation)  Social/Functional History  Social/Functional History  Type of Home: Facility([facility]  Home Layout: One level  Home Access: Level entry  Bathroom Shower/Tub: Walk-in shower  Bathroom Equipment: Shower chair  Bathroom Accessibility: Accessible  ADL Assistance: (Assist from staff at [facility])  Homemaking Assistance: (Facility completes)  Ambulation Assistance: Needs assistance(With therapy with RW)  Transfer Assistance: Needs assistance  Active : No  Additional Comments: Pt hospitalized in 5/2019 and discharged to [place] for rehab. Prior to hospitalization in May pt lived with spouse in one level house with 8-10 steps. Pt required assist from wife for bathing and homemaking and functional mobility   Cognition   Cognition  Overall Cognitive Status: Exceptions  Arousal/Alertness: Delayed responses to stimuli  Following Commands: Follows one step commands with increased time; Follows one step commands with repetition  Attention Span: Attends with cues to redirect; Difficulty dividing attention  Memory: Decreased recall of recent events;Decreased short term memory  Safety Judgement: Decreased awareness of need for assistance;Decreased awareness of need for safety  Problem Solving: Assistance required to generate solutions;Assistance required to implement solutions  Insights: Decreased awareness of deficits  Initiation: Requires cues for some  Sequencing: Requires cues for some    Objective          AROM RLE (degrees)  RLE AROM: WFL  AROM LLE (degrees)  LLE AROM : WFL  Strength RLE  Strength RLE: WFL  Strength LLE  Strength LLE: WFL        Bed mobility  Supine to Sit: Minimal assistance; Moderate assistance  Scooting:  Moderate assistance  Transfers  Sit to Stand: Contact guard assistance  Stand to sit: Contact guard

## 2019-07-04 NOTE — PROGRESS NOTES
145 149*  --   --  146*   K 3.4* 2.9* 3.2* 3.5 4.0    116*  --   --  115*   CO2 27 23  --   --  21   BUN 16 15  --   --  12   CREATININE 1.4* 1.1  --   --  1.1       LIVER PROFILE:   Recent Labs     07/02/19  1520   ALKPHOS 101   AST 46*   ALT 21   BILITOT 2.0*     No results for input(s): AMYLASE in the last 72 hours. No results for input(s): LIPASE in the last 72 hours. UA:  Recent Labs     07/02/19  1539   WBCUA 227*   RBCUA 9*       TROPONIN: No results for input(s): CKTOTAL, TROPONINI in the last 72 hours. Lab Results   Component Value Date/Time    URRFLXCULT Yes 07/02/2019 03:39 PM       No results for input(s): TSHREFLEX in the last 72 hours. No components found for: [id number]  POC GLUCOSE:  No results for input(s): POCGLU in the last 72 hours. No results for input(s): LABA1C in the last 72 hours. No results found for: LABA1C      ASSESSMENT AND PLAN  Acute/Subacute Hepatic Encephalopathy   Mental status is improving  Ammonia level improved  Continue patient on lactulose and rifaximin  Continue on nadolol   GI consult is appreciated  Advance diet as tolerate      Anxiety depression F 41.9  Continue on home medication    UTI  E. Coli 50 K but pyuria  Start on ceftriaxone        Code Status: Full Code        Dispo - continue care        The patient and / or the family were informed of the results of any tests, a time was given to answer questions, a plan was proposed and they agreed with plan. Rinku Granados MD    This note was transcribed using [id number] [employer]. Please disregard any translational errors.

## 2019-07-04 NOTE — PROGRESS NOTES
transfers with CGAx2. Pt completed short functional mobility with min Ax2 with hand held assist. Anticipate pt to require mod/max A for ADL needs at this time. Pt is unsafe to return home and would benefit from continued skilled therapy to increase mobility, safety, and independence prior to return home. Treatment Diagnosis: impaired func mob, transfers, and ADL tasks   Prognosis: Fair  Decision Making: Medium Complexity  History: PMH: TBI, CVA, ETOH abuse  Exam: ADLs, transfers, func mob, bed mob  Assistance / Modification: CGA/min Ax2 for mobility, mod/max A for ADLs  Patient Education: Role of OT, POC, safety   REQUIRES OT FOLLOW UP: Yes  Activity Tolerance  Activity Tolerance: Treatment limited secondary to decreased cognition  Safety Devices  Safety Devices in place: Yes(RN Dorcas Yusuf) notified)  Type of devices: Left in chair;Call light within reach; Chair alarm in place;Gait belt;Nurse notified           Patient Diagnosis(es): The primary encounter diagnosis was Altered mental status, unspecified altered mental status type. Diagnoses of Hepatic encephalopathy ([address] and Urinary tract infection associated with catheterization of urinary tract, unspecified indwelling urinary catheter type, initial encounter [facility]) were also pertinent to this visit. has a past medical history of Abnormality of gait, Alcohol dependence ([address] Alcoholic cirrhosis ([address] Anxiety, Arthritis, Basal cell carcinoma, Cerebral artery occlusion with cerebral infarction ([address] Circulation problem, Dementia, Depressive disorder, GERD (gastroesophageal reflux disease), Hypomagnesemia, Hypopotassemia, SOB (shortness of breath), Spinal stenosis, Suicidal behavior, Syncope, Thrombocytopenia ([address] Traumatic brain injury ([address] Wears dentures, Wears glasses, and Wheezing. has a past surgical history that includes Upper gastrointestinal endoscopy; Colonoscopy;  Upper gastrointestinal endoscopy (01/04/2018); and Colonoscopy (08/22/2018). Treatment Diagnosis: impaired func mob, transfers, and ADL tasks       Restrictions  Restrictions/Precautions  Restrictions/Precautions: Fall Risk  Position Activity Restriction  Other position/activity restrictions: Telesitter    Subjective   General  Chart Reviewed: Yes  Patient assessed for rehabilitation services?: Yes  Additional Pertinent Hx: Per Dr. Solomon Kohli 7/2/19: Pt is \"59 y.o.male with medical history significant for alcohol abuse and alcoholic cirrhosis, CVA, dementia, anxiety depression, GERD, spinal stenosisand COPD. Patient presented to the emergency room with generalized weakness and deterioration of mental status. \"  Family / Caregiver Present: No  Referring Practitioner: Solomon Kohli MD  Diagnosis: Hepatic encephalopathy   Subjective  Subjective: Pt met bedside for OT/PT evaluation. Agreeable for OOB activity and therapy evaluation. Patient Currently in Pain: Denies  Vital Signs  Temp: 97.8 °F (36.6 °C)  Temp Source: Oral  Pulse: 60  Heart Rate Source: Monitor  Resp: 18  BP: 131/67  BP Location: Right Arm  BP Upper/Lower: Upper  MAP (mmHg): 88  Patient Currently in Pain: Denies  Oxygen Therapy  SpO2: 100 %  O2 Device: None (Room air)     Social/Functional History  Social/Functional History  Type of Home: Facility([facility])  Home Layout: One level  Home Access: Level entry  Bathroom Shower/Tub: Walk-in shower  Bathroom Equipment: Shower chair  Bathroom Accessibility: Accessible  ADL Assistance: (Assist from staff at [facility])  Homemaking Assistance: (Facility completes)  Ambulation Assistance: Needs assistance(With therapy with RW)  Transfer Assistance: Needs assistance  Active : No  Additional Comments: Pt hospitalized in 5/2019 and discharged to [place] for rehab. Prior to hospitalization in May pt lived with spouse in one level house with 8-10 steps.  Pt required assist from wife for bathing and homemaking and functional mobility        Objective   Orientation  Overall Orientation Status: Impaired  Orientation Level: Oriented to place;Oriented to person;Disoriented to situation     Balance  Sitting Balance: Stand by assistance  Standing Balance: Contact guard assistance  Standing Balance  Time: ~1 minute  Activity: Transfers, func mob    Functional Mobility  Functional - Mobility Device: No device(Hand hold assist x2)  Activity: Other(bed > recliner )  Assist Level: Minimal assistance(Min Ax2 )  Functional Mobility Comments: Pt completed functional mobility with hand held assist min Ax2, no overt LOB but unsteady. Reports using RW at SNF. ADL  LE Dressing: (Assist for socks)  Toileting: (Catheter in place, felt like he was having a BM but was not )  Additional Comments: PTA pt has assistance for ADL tasks from SNF staff - pt unable to state specific level of assistance. Anticipate pt to require mod/max A for bathing/dressing/toileting needs at this time      Tone RUE  RUE Tone: Normotonic  Tone LUE  LUE Tone: Normotonic  Coordination  Movements Are Fluid And Coordinated: Yes     Bed mobility  Supine to Sit: Minimal assistance; Moderate assistance  Scooting: Moderate assistance     Transfers  Sit to stand: Contact guard assistance  Stand to sit: Contact guard assistance  Transfer Comments: CGAx2 for sit <> stand from EOB and sat to recliner chair      Cognition  Overall Cognitive Status: Exceptions  Arousal/Alertness: Delayed responses to stimuli  Following Commands: Follows one step commands with increased time; Follows one step commands with repetition  Attention Span: Attends with cues to redirect; Difficulty dividing attention  Memory: Decreased recall of recent events;Decreased short term memory  Safety Judgement: Decreased awareness of need for assistance;Decreased awareness of need for safety  Problem Solving: Assistance required to generate solutions;Assistance required to implement solutions  Insights: Decreased awareness of deficits  Initiation: Requires cues for some  Sequencing: Requires cues for some        Sensation  Overall Sensation Status: (Did not formally assess)        LUE AROM (degrees)  LUE General AROM: Limited ROM in shoulder flexion, distal UE WFL   Left Hand AROM (degrees)  Left Hand AROM: WFL  Left Hand General AROM: Digit deformity   RUE AROM (degrees)  RUE General AROM: Limited ROM in shoulder flexion, distal UE WFL   Right Hand AROM (degrees)  Right Hand AROM: WFL  Right Hand General AROM: Digit deformity      LUE Strength  Gross LUE Strength: WFL  RUE Strength  Gross RUE Strength: WFL           Plan   Plan  Times per week: 3-5  Times per day: Daily  Current Treatment Recommendations: Strengthening, Functional Mobility Training, Endurance Training, Balance Training, Safety Education & Training, Equipment Evaluation, Education, & procurement, Patient/Caregiver Education & Training, Self-Care / ADL      AM-PAC Score    Lili Merritt scored a 13/24 on the AM-[facility] ADL Inpatient form. Current research shows that an AM-PAC score of 17 or less is typically not associated with a discharge to the patient's home setting. Based on the patients AM-PAC score and their current ADL deficits, it is recommended that the patient have 3-5 sessions per week of Occupational Therapy at d/c to increase the patients independence.      [facility] Inpatient Daily Activity Raw Score: 13 (07/04/19 [facility])  AM-PAC Inpatient ADL T-Scale Score : 32.03 (07/04/19 1353)  ADL Inpatient CMS 0-100% Score: 63.03 (07/04/19 [facility])  ADL Inpatient CMS G-Code Modifier : CL (07/04/19 [facility])    Goals  Short term goals  Time Frame for Short term goals: prior to d/c  Short term goal 1: Pt will complete functional mobility and transfers with CGA  Short term goal 2: Pt will complete bathing with min A  Short term goal 3: Pt will complete dressing with min A  Short term goal 4: Pt will complete toileting with min A  Short term goal 5: Pt will complete grooming with setup   Patient Goals   Patient goals : Pt

## 2019-07-05 PROCEDURE — 97116 GAIT TRAINING THERAPY: CPT | Performed by: PHYSICAL THERAPIST

## 2019-07-05 PROCEDURE — 6360000002 HC RX W HCPCS: Performed by: HOSPITALIST

## 2019-07-05 PROCEDURE — 97530 THERAPEUTIC ACTIVITIES: CPT

## 2019-07-05 PROCEDURE — 6370000000 HC RX 637 (ALT 250 FOR IP): Performed by: HOSPITALIST

## 2019-07-05 PROCEDURE — 94760 N-INVAS EAR/PLS OXIMETRY 1: CPT

## 2019-07-05 PROCEDURE — 2060000000 HC ICU INTERMEDIATE R&B

## 2019-07-05 PROCEDURE — 97110 THERAPEUTIC EXERCISES: CPT | Performed by: PHYSICAL THERAPIST

## 2019-07-05 PROCEDURE — 97110 THERAPEUTIC EXERCISES: CPT

## 2019-07-05 PROCEDURE — 2580000003 HC RX 258: Performed by: HOSPITALIST

## 2019-07-05 RX ADMIN — POTASSIUM CHLORIDE 50 MEQ: 750 TABLET, FILM COATED, EXTENDED RELEASE ORAL at 09:14

## 2019-07-05 RX ADMIN — RIFAXIMIN 550 MG: 550 TABLET ORAL at 20:25

## 2019-07-05 RX ADMIN — SODIUM CHLORIDE: 9 INJECTION, SOLUTION INTRAVENOUS at 04:21

## 2019-07-05 RX ADMIN — ENOXAPARIN SODIUM 40 MG: 40 INJECTION SUBCUTANEOUS at 09:15

## 2019-07-05 RX ADMIN — FAMOTIDINE 20 MG: 20 TABLET ORAL at 20:25

## 2019-07-05 RX ADMIN — RIFAXIMIN 550 MG: 550 TABLET ORAL at 09:14

## 2019-07-05 RX ADMIN — TAMSULOSIN HYDROCHLORIDE 0.4 MG: 0.4 CAPSULE ORAL at 20:25

## 2019-07-05 RX ADMIN — SODIUM CHLORIDE: 9 INJECTION, SOLUTION INTRAVENOUS at 18:52

## 2019-07-05 RX ADMIN — LACTULOSE 30 G: 20 SOLUTION ORAL at 20:25

## 2019-07-05 RX ADMIN — FAMOTIDINE 20 MG: 20 TABLET ORAL at 09:14

## 2019-07-05 RX ADMIN — DULOXETINE HYDROCHLORIDE 30 MG: 30 CAPSULE, DELAYED RELEASE ORAL at 09:14

## 2019-07-05 RX ADMIN — Medication 1 TABLET: at 09:14

## 2019-07-05 RX ADMIN — LACTULOSE 30 G: 20 SOLUTION ORAL at 09:14

## 2019-07-05 RX ADMIN — POTASSIUM CHLORIDE 50 MEQ: 750 TABLET, FILM COATED, EXTENDED RELEASE ORAL at 20:25

## 2019-07-05 RX ADMIN — Medication 100 MG: at 09:14

## 2019-07-05 RX ADMIN — AMIODARONE HYDROCHLORIDE 200 MG: 200 TABLET ORAL at 09:14

## 2019-07-05 RX ADMIN — CEFTRIAXONE 1 G: 1 INJECTION, POWDER, FOR SOLUTION INTRAMUSCULAR; INTRAVENOUS at 13:19

## 2019-07-05 RX ADMIN — NADOLOL 20 MG: 40 TABLET ORAL at 09:14

## 2019-07-05 NOTE — PROGRESS NOTES
Span: Attends with cues to redirect; Difficulty dividing attention  Memory: Decreased recall of recent events;Decreased short term memory  Safety Judgement: Decreased awareness of need for assistance;Decreased awareness of need for safety  Problem Solving: Assistance required to generate solutions;Assistance required to implement solutions  Insights: Decreased awareness of deficits  Initiation: Requires cues for some  Sequencing: Requires cues for some  Objective   Bed mobility  Supine to Sit: Stand by assistance;Contact guard assistance(HOB elevated; increased time to complete)  Sit to Supine: Unable to assess  Scooting: Stand by assistance(to scoot EOB)  Transfers  Sit to Stand: Contact guard assistance  Stand to sit: Contact guard assistance  Ambulation  Ambulation?: Yes  Ambulation 1  Surface: level tile  Device: Rolling Walker  Assistance: Contact guard assistance;Minimal assistance  Quality of Gait: pt slightly unsteady and slightly impulsive; some ataxia noted with walking  Gait Deviations: Shuffles  Distance: 20'     Balance  Comments: SBA for sitting balance; CGA for static standing  Exercises  Comments: performed B LE ex in sitting x 15 reps         Comment: assisted with positioning in chair for comfort; LEs elevated           AM-PAC Score  -PAC Inpatient Mobility Raw Score : 14 (07/04/19 [facility]9)  -PAC Inpatient T-Scale Score : 38.1 (07/04/19 1359)  Mobility Inpatient CMS 0-100% Score: 61.29 (07/04/19 [facility]9)  Mobility Inpatient CMS G-Code Modifier : CL (07/04/19 1359)          Goals  Short term goals  Time Frame for Short term goals: by discharge - all goals ongoing 7-5  Short term goal 1: bed mob SBA  Short term goal 2: transfers SBA  Short term goal 3: amb 48' with RW SBA  Patient Goals   Patient goals : to get home    Plan    Plan  Times per week: 3-5  Current Treatment Recommendations: Functional Mobility Training  Safety Devices  Type of devices: Call light within reach, Chair alarm in place, Left in chair, Nurse notified     Therapy Time   Individual Concurrent Group Co-treatment   Time In          Time Out [phone]         Minutes 40              If patient is discharged prior to the next physical therapy visit;  Please see last written PT note for discharge status.     Haleigh Romero, PT, [id number]   [address] ROMERO, PT

## 2019-07-05 NOTE — CARE COORDINATION
Patient's mental status improving. Sw met with patient, no family present. Patient did confirm that he was at [facility] and plans on returning at discharge. Sw received call from patient's wife checking status on discharge to facility. Attempted to call patient back, but was not able to get through on both numbers listed. Will need to review IMM with patient's wife.      Trang Degroot, [facility]  [phone]  7/5/19 at 4:30 PM

## 2019-07-05 NOTE — DISCHARGE INSTR - COC
Continuity of Care Form    Patient Name: Maggie Huang   :  1950  MRN:  [id number]    6 [facility] date:  2019  Discharge date:  19    Code Status Order: Full Code   Advance Directives:   [facility] Documentation     Date/Time Healthcare Directive Type of Healthcare Directive Copy in [address] Agent's Name Healthcare Agent's Phone Number    [phone]  Unknown, patient unable to respond due to medical condition -- -- -- -- --          Admitting Physician:  Jose Snell MD  PCP: Nacho Cruz MD    Discharging Nurse: [facility] Unit/Room#: O3O-8425/0633-80  Discharging Unit Phone Number: [phone]    Emergency Contact:   Extended Emergency Contact Information  Primary Emergency Contact: Arben Graces  Address: [address] Phone: [phone]  Mobile Phone: [phone]  Relation: Spouse  Secondary Emergency Contact: [employer] Phone: [phone]  Relation: Child    Past Surgical History:  Past Surgical History:   Procedure Laterality Date    COLONOSCOPY      COLONOSCOPY  2018    hemorrhoids    UPPER GASTROINTESTINAL ENDOSCOPY      history of esophageal dilitation    UPPER GASTROINTESTINAL ENDOSCOPY  2018       Immunization History: There is no immunization history on file for this patient.     Active Problems:  Patient Active Problem List   Diagnosis Code    Hepatic encephalopathy ([facility] K72.90    Hyperammonemia (HCC) Y47.89    Alcoholic cirrhosis (HCC) Y63.34    Dementia F03.90    Thrombocytopenia ([facility] D69.6    Hypokalemia E87.6    Hypomagnesemia E83.42    Hyperbilirubinemia E80.6    Urinary tract infection associated with cystostomy catheter (HCC) T83.510A, N39.0    Generalized weakness R53.1    Paroxysmal atrial fibrillation (HCC) I48.0       Isolation/Infection:   Isolation          Contact        Patient Infection Status     Infection Encounter Level? Onset Date Added Added By Resolved Resolved By Review Date    MDRO (multi-drug resistant organism) No 07/02/19 07/04/19 Urine Culture             Nurse Assessment:  Last Vital Signs: /63   Pulse 67   Temp 98.2 °F (36.8 °C) (Oral)   Resp 18   Ht 6' (1.829 m)   Wt 205 lb 0.4 oz (93 kg)   SpO2 95%   BMI 27.81 kg/m²     Last documented pain score (0-10 scale): Pain Level: 0  Last Weight:   Wt Readings from Last 1 Encounters:   07/05/19 205 lb 0.4 oz (93 kg)     Mental Status:  oriented and alert, forgetful at times with poor safety awareness. IV Access:  - None    Nursing Mobility/ADLs:  Walking   Assisted  Transfer  Assisted  Bathing  Assisted  Dressing  Assisted  Toileting  Dependent  Feeding  Independent after set up  Med Admin  Assisted  Med Delivery   whole    Wound Care Documentation and Therapy:        Elimination:  Continence:   · Bowel: No  · Bladder: Yes  Urinary Catheter: suprapubic   Colostomy/Ileostomy/Ileal Conduit: No       Date of Last BM: 7/5    Intake/Output Summary (Last 24 hours) at 7/5/2019 1528  Last data filed at 7/5/2019 1213  Gross per 24 hour   Intake 720 ml   Output 1250 ml   Net -530 ml     I/O last 3 completed shifts: In: 5 [P.O.:720]  Out: 1250 [Urine:1250]    Safety Concerns: At Risk for Falls    Impairments/Disabilities:      Difficulty walking, forgetful. Nutrition Therapy:  Current Nutrition Therapy:   - Oral Diet:  Dental Soft    Routes of Feeding: Oral  Liquids: Thin Liquids  Daily Fluid Restriction: no  Last Modified Barium Swallow with Video (Video Swallowing Test): not done    Treatments at the Time of Hospital Discharge:   Respiratory Treatments: N/A  Oxygen Therapy:  is not on home oxygen therapy.   Ventilator:    - No ventilator support    Rehab Therapies: Physical Therapy and Occupational Therapy  Weight Bearing Status/Restrictions: No weight bearing restirctions  Other Medical Equipment (for information only, NOT a DME order):  walker  Other Treatments:     Patient's personal belongings (please select all that are sent with patient):  None    RN SIGNATURE:  Electronically signed by Gonzalo Saba RN on 7/6/19 at 3:30 PM    CASE MANAGEMENT/SOCIAL WORK SECTION    Inpatient Status Date: 7/2/19    Readmission Risk Assessment Score:  Readmission Risk              Risk of Unplanned Readmission:        20           Discharging to Facility/ Agency   · Name: [facility]  · [phone]  · Fax: [phone]    Dialysis Facility (if applicable)   · Name:  · Address:  · Dialysis Schedule:  · Phone:  · Fax:    / signature: Electronically signed by Surya Arteaga MSW, LSW on 7/5/19 at 3:28 PM      PHYSICIAN SECTION    Prognosis: Fair    Condition at Discharge: Stable    Rehab Potential (if transferring to Rehab): Fair    Recommended Labs or Other Treatments After Discharge: f/u GI 2 weeks     Physician Certification: I certify the above information and transfer of Kemi Valiente  is necessary for the continuing treatment of the diagnosis listed and that he requires [facility] for greater 30 days.      Update Admission H&P: No change in H&P    PHYSICIAN SIGNATURE:  Electronically signed by Sushma Hebert MD on 7/6/19 at 3:14 PM

## 2019-07-06 VITALS
DIASTOLIC BLOOD PRESSURE: 62 MMHG | BODY MASS INDEX: 28.4 KG/M2 | HEART RATE: 70 BPM | TEMPERATURE: 98.1 F | HEIGHT: 72 IN | WEIGHT: 209.66 LBS | SYSTOLIC BLOOD PRESSURE: 105 MMHG | OXYGEN SATURATION: 98 % | RESPIRATION RATE: 16 BRPM

## 2019-07-06 LAB
AMMONIA: 48 UMOL/L (ref 16–60)
ANION GAP SERPL CALCULATED.3IONS-SCNC: 5 MMOL/L (ref 3–16)
BASOPHILS ABSOLUTE: 0 K/UL (ref 0–0.2)
BASOPHILS RELATIVE PERCENT: 1 %
BUN BLDV-MCNC: 8 MG/DL (ref 7–20)
CALCIUM SERPL-MCNC: 8.6 MG/DL (ref 8.3–10.6)
CHLORIDE BLD-SCNC: 111 MMOL/L (ref 99–110)
CO2: 22 MMOL/L (ref 21–32)
CREAT SERPL-MCNC: 0.8 MG/DL (ref 0.8–1.3)
EOSINOPHILS ABSOLUTE: 0.1 K/UL (ref 0–0.6)
EOSINOPHILS RELATIVE PERCENT: 3.6 %
GFR AFRICAN AMERICAN: >60
GFR NON-AFRICAN AMERICAN: >60
GLUCOSE BLD-MCNC: 93 MG/DL (ref 70–99)
HCT VFR BLD CALC: 25.5 % (ref 40.5–52.5)
HEMOGLOBIN: 8.5 G/DL (ref 13.5–17.5)
LYMPHOCYTES ABSOLUTE: 1.3 K/UL (ref 1–5.1)
LYMPHOCYTES RELATIVE PERCENT: 42.6 %
MCH RBC QN AUTO: 36.4 PG (ref 26–34)
MCHC RBC AUTO-ENTMCNC: 33.4 G/DL (ref 31–36)
MCV RBC AUTO: 109 FL (ref 80–100)
MONOCYTES ABSOLUTE: 0.3 K/UL (ref 0–1.3)
MONOCYTES RELATIVE PERCENT: 10.4 %
NEUTROPHILS ABSOLUTE: 1.3 K/UL (ref 1.7–7.7)
NEUTROPHILS RELATIVE PERCENT: 42.4 %
PDW BLD-RTO: 17.2 % (ref 12.4–15.4)
PLATELET # BLD: 73 K/UL (ref 135–450)
PMV BLD AUTO: 9.3 FL (ref 5–10.5)
POTASSIUM REFLEX MAGNESIUM: 4.4 MMOL/L (ref 3.5–5.1)
RBC # BLD: 2.34 M/UL (ref 4.2–5.9)
SODIUM BLD-SCNC: 138 MMOL/L (ref 136–145)
WBC # BLD: 3.1 K/UL (ref 4–11)

## 2019-07-06 PROCEDURE — 6370000000 HC RX 637 (ALT 250 FOR IP): Performed by: HOSPITALIST

## 2019-07-06 PROCEDURE — 80048 BASIC METABOLIC PNL TOTAL CA: CPT

## 2019-07-06 PROCEDURE — 82140 ASSAY OF AMMONIA: CPT

## 2019-07-06 PROCEDURE — 36415 COLL VENOUS BLD VENIPUNCTURE: CPT

## 2019-07-06 PROCEDURE — 6360000002 HC RX W HCPCS: Performed by: HOSPITALIST

## 2019-07-06 PROCEDURE — 94761 N-INVAS EAR/PLS OXIMETRY MLT: CPT

## 2019-07-06 PROCEDURE — 85025 COMPLETE CBC W/AUTO DIFF WBC: CPT

## 2019-07-06 PROCEDURE — 2580000003 HC RX 258: Performed by: HOSPITALIST

## 2019-07-06 RX ADMIN — NADOLOL 20 MG: 40 TABLET ORAL at 09:21

## 2019-07-06 RX ADMIN — LACTULOSE 30 G: 20 SOLUTION ORAL at 09:23

## 2019-07-06 RX ADMIN — SODIUM CHLORIDE: 9 INJECTION, SOLUTION INTRAVENOUS at 06:56

## 2019-07-06 RX ADMIN — AMIODARONE HYDROCHLORIDE 200 MG: 200 TABLET ORAL at 09:21

## 2019-07-06 RX ADMIN — Medication 1 TABLET: at 09:21

## 2019-07-06 RX ADMIN — FAMOTIDINE 20 MG: 20 TABLET ORAL at 09:21

## 2019-07-06 RX ADMIN — ENOXAPARIN SODIUM 40 MG: 40 INJECTION SUBCUTANEOUS at 09:21

## 2019-07-06 RX ADMIN — DULOXETINE HYDROCHLORIDE 30 MG: 30 CAPSULE, DELAYED RELEASE ORAL at 09:21

## 2019-07-06 RX ADMIN — Medication 100 MG: at 09:21

## 2019-07-06 RX ADMIN — RIFAXIMIN 550 MG: 550 TABLET ORAL at 09:21

## 2019-07-06 RX ADMIN — CEFTRIAXONE 1 G: 1 INJECTION, POWDER, FOR SOLUTION INTRAMUSCULAR; INTRAVENOUS at 12:34

## 2019-07-06 RX ADMIN — POTASSIUM CHLORIDE 50 MEQ: 750 TABLET, FILM COATED, EXTENDED RELEASE ORAL at 09:21

## 2019-07-06 ASSESSMENT — PAIN SCALES - GENERAL
PAINLEVEL_OUTOF10: 0
PAINLEVEL_OUTOF10: 6

## 2019-07-07 LAB
BLOOD CULTURE, ROUTINE: NORMAL
CULTURE, BLOOD 2: NORMAL

## 2019-07-17 ENCOUNTER — APPOINTMENT (OUTPATIENT)
Dept: GENERAL RADIOLOGY | Age: 69
DRG: 442 | End: 2019-07-17
Payer: MEDICARE

## 2019-07-17 ENCOUNTER — APPOINTMENT (OUTPATIENT)
Dept: CT IMAGING | Age: 69
DRG: 442 | End: 2019-07-17
Payer: MEDICARE

## 2019-07-17 ENCOUNTER — HOSPITAL ENCOUNTER (INPATIENT)
Age: 69
LOS: 8 days | Discharge: NURSING, REHAB OR LONG-TERM CARE | DRG: 442 | End: 2019-07-25
Attending: EMERGENCY MEDICINE | Admitting: INTERNAL MEDICINE
Payer: MEDICARE

## 2019-07-17 DIAGNOSIS — K76.82 HEPATIC ENCEPHALOPATHY: Primary | ICD-10-CM

## 2019-07-17 DIAGNOSIS — E87.6 HYPOKALEMIA: ICD-10-CM

## 2019-07-17 DIAGNOSIS — N17.9 AKI (ACUTE KIDNEY INJURY) (HCC): ICD-10-CM

## 2019-07-17 DIAGNOSIS — D18.1 HYGROMA: ICD-10-CM

## 2019-07-17 LAB
A/G RATIO: 0.7 (ref 1.1–2.2)
ALBUMIN SERPL-MCNC: 2.6 G/DL (ref 3.4–5)
ALP BLD-CCNC: 114 U/L (ref 40–129)
ALT SERPL-CCNC: 22 U/L (ref 10–40)
AMMONIA: 155 UMOL/L (ref 16–60)
ANION GAP SERPL CALCULATED.3IONS-SCNC: 11 MMOL/L (ref 3–16)
AST SERPL-CCNC: 45 U/L (ref 15–37)
BASOPHILS ABSOLUTE: 0 K/UL (ref 0–0.2)
BASOPHILS RELATIVE PERCENT: 1.2 %
BILIRUB SERPL-MCNC: 1.8 MG/DL (ref 0–1)
BUN BLDV-MCNC: 17 MG/DL (ref 7–20)
CALCIUM SERPL-MCNC: 9.6 MG/DL (ref 8.3–10.6)
CHLORIDE BLD-SCNC: 101 MMOL/L (ref 99–110)
CO2: 32 MMOL/L (ref 21–32)
CREAT SERPL-MCNC: 1.4 MG/DL (ref 0.8–1.3)
EKG ATRIAL RATE: 64 BPM
EKG DIAGNOSIS: NORMAL
EKG P AXIS: 117 DEGREES
EKG P-R INTERVAL: 154 MS
EKG Q-T INTERVAL: 582 MS
EKG QRS DURATION: 100 MS
EKG QTC CALCULATION (BAZETT): 600 MS
EKG R AXIS: 25 DEGREES
EKG T AXIS: 25 DEGREES
EKG VENTRICULAR RATE: 64 BPM
EOSINOPHILS ABSOLUTE: 0.1 K/UL (ref 0–0.6)
EOSINOPHILS RELATIVE PERCENT: 2.3 %
GFR AFRICAN AMERICAN: >60
GFR NON-AFRICAN AMERICAN: 50
GLOBULIN: 3.5 G/DL
GLUCOSE BLD-MCNC: 102 MG/DL (ref 70–99)
GLUCOSE BLD-MCNC: 119 MG/DL (ref 70–99)
GLUCOSE BLD-MCNC: 125 MG/DL (ref 70–99)
GLUCOSE BLD-MCNC: 99 MG/DL (ref 70–99)
HCT VFR BLD CALC: 27.9 % (ref 40.5–52.5)
HEMOGLOBIN: 9.3 G/DL (ref 13.5–17.5)
LACTIC ACID: 2 MMOL/L (ref 0.4–2)
LIPASE: 37 U/L (ref 13–60)
LYMPHOCYTES ABSOLUTE: 1.2 K/UL (ref 1–5.1)
LYMPHOCYTES RELATIVE PERCENT: 36.2 %
MAGNESIUM: 1.7 MG/DL (ref 1.8–2.4)
MCH RBC QN AUTO: 35.2 PG (ref 26–34)
MCHC RBC AUTO-ENTMCNC: 33.2 G/DL (ref 31–36)
MCV RBC AUTO: 106.3 FL (ref 80–100)
MONOCYTES ABSOLUTE: 0.5 K/UL (ref 0–1.3)
MONOCYTES RELATIVE PERCENT: 14.8 %
NEUTROPHILS ABSOLUTE: 1.5 K/UL (ref 1.7–7.7)
NEUTROPHILS RELATIVE PERCENT: 45.5 %
PDW BLD-RTO: 16.7 % (ref 12.4–15.4)
PERFORMED ON: ABNORMAL
PERFORMED ON: ABNORMAL
PERFORMED ON: NORMAL
PLATELET # BLD: 75 K/UL (ref 135–450)
PMV BLD AUTO: 9.5 FL (ref 5–10.5)
POTASSIUM REFLEX MAGNESIUM: 2.9 MMOL/L (ref 3.5–5.1)
RBC # BLD: 2.63 M/UL (ref 4.2–5.9)
SODIUM BLD-SCNC: 144 MMOL/L (ref 136–145)
TOTAL PROTEIN: 6.1 G/DL (ref 6.4–8.2)
TROPONIN: <0.01 NG/ML
WBC # BLD: 3.4 K/UL (ref 4–11)

## 2019-07-17 PROCEDURE — 96365 THER/PROPH/DIAG IV INF INIT: CPT

## 2019-07-17 PROCEDURE — 2580000003 HC RX 258: Performed by: INTERNAL MEDICINE

## 2019-07-17 PROCEDURE — 1200000000 HC SEMI PRIVATE

## 2019-07-17 PROCEDURE — 6360000002 HC RX W HCPCS: Performed by: INTERNAL MEDICINE

## 2019-07-17 PROCEDURE — 83690 ASSAY OF LIPASE: CPT

## 2019-07-17 PROCEDURE — 80053 COMPREHEN METABOLIC PANEL: CPT

## 2019-07-17 PROCEDURE — 93005 ELECTROCARDIOGRAM TRACING: CPT | Performed by: EMERGENCY MEDICINE

## 2019-07-17 PROCEDURE — 93010 ELECTROCARDIOGRAM REPORT: CPT | Performed by: INTERNAL MEDICINE

## 2019-07-17 PROCEDURE — 2580000003 HC RX 258: Performed by: EMERGENCY MEDICINE

## 2019-07-17 PROCEDURE — 87040 BLOOD CULTURE FOR BACTERIA: CPT

## 2019-07-17 PROCEDURE — 83735 ASSAY OF MAGNESIUM: CPT

## 2019-07-17 PROCEDURE — 96361 HYDRATE IV INFUSION ADD-ON: CPT

## 2019-07-17 PROCEDURE — 99285 EMERGENCY DEPT VISIT HI MDM: CPT

## 2019-07-17 PROCEDURE — 85025 COMPLETE CBC W/AUTO DIFF WBC: CPT

## 2019-07-17 PROCEDURE — 71045 X-RAY EXAM CHEST 1 VIEW: CPT

## 2019-07-17 PROCEDURE — 83605 ASSAY OF LACTIC ACID: CPT

## 2019-07-17 PROCEDURE — 84484 ASSAY OF TROPONIN QUANT: CPT

## 2019-07-17 PROCEDURE — 36415 COLL VENOUS BLD VENIPUNCTURE: CPT

## 2019-07-17 PROCEDURE — 6370000000 HC RX 637 (ALT 250 FOR IP): Performed by: INTERNAL MEDICINE

## 2019-07-17 PROCEDURE — 82140 ASSAY OF AMMONIA: CPT

## 2019-07-17 PROCEDURE — 94760 N-INVAS EAR/PLS OXIMETRY 1: CPT

## 2019-07-17 PROCEDURE — 70450 CT HEAD/BRAIN W/O DYE: CPT

## 2019-07-17 PROCEDURE — 6360000002 HC RX W HCPCS: Performed by: EMERGENCY MEDICINE

## 2019-07-17 RX ORDER — SODIUM CHLORIDE 0.9 % (FLUSH) 0.9 %
10 SYRINGE (ML) INJECTION PRN
Status: DISCONTINUED | OUTPATIENT
Start: 2019-07-17 | End: 2019-07-25 | Stop reason: HOSPADM

## 2019-07-17 RX ORDER — ONDANSETRON 2 MG/ML
4 INJECTION INTRAMUSCULAR; INTRAVENOUS EVERY 6 HOURS PRN
Status: DISCONTINUED | OUTPATIENT
Start: 2019-07-17 | End: 2019-07-25 | Stop reason: HOSPADM

## 2019-07-17 RX ORDER — 0.9 % SODIUM CHLORIDE 0.9 %
1000 INTRAVENOUS SOLUTION INTRAVENOUS ONCE
Status: COMPLETED | OUTPATIENT
Start: 2019-07-17 | End: 2019-07-17

## 2019-07-17 RX ORDER — SODIUM CHLORIDE 0.9 % (FLUSH) 0.9 %
10 SYRINGE (ML) INJECTION EVERY 12 HOURS SCHEDULED
Status: DISCONTINUED | OUTPATIENT
Start: 2019-07-17 | End: 2019-07-25 | Stop reason: HOSPADM

## 2019-07-17 RX ORDER — POTASSIUM CHLORIDE 7.45 MG/ML
10 INJECTION INTRAVENOUS
Status: COMPLETED | OUTPATIENT
Start: 2019-07-17 | End: 2019-07-18

## 2019-07-17 RX ORDER — POTASSIUM CHLORIDE 7.45 MG/ML
10 INJECTION INTRAVENOUS ONCE
Status: COMPLETED | OUTPATIENT
Start: 2019-07-17 | End: 2019-07-17

## 2019-07-17 RX ORDER — MAGNESIUM SULFATE 1 G/100ML
1 INJECTION INTRAVENOUS ONCE
Status: COMPLETED | OUTPATIENT
Start: 2019-07-17 | End: 2019-07-17

## 2019-07-17 RX ORDER — DULOXETIN HYDROCHLORIDE 30 MG/1
30 CAPSULE, DELAYED RELEASE ORAL DAILY
Status: DISCONTINUED | OUTPATIENT
Start: 2019-07-17 | End: 2019-07-17

## 2019-07-17 RX ORDER — NITROFURANTOIN MACROCRYSTALS 50 MG/1
50 CAPSULE ORAL 2 TIMES DAILY
Status: ON HOLD | COMMUNITY
Start: 2019-07-12 | End: 2019-07-23 | Stop reason: HOSPADM

## 2019-07-17 RX ORDER — AMIODARONE HYDROCHLORIDE 200 MG/1
200 TABLET ORAL DAILY
Status: DISCONTINUED | OUTPATIENT
Start: 2019-07-17 | End: 2019-07-25 | Stop reason: HOSPADM

## 2019-07-17 RX ORDER — SODIUM CHLORIDE 9 MG/ML
INJECTION, SOLUTION INTRAVENOUS CONTINUOUS
Status: DISCONTINUED | OUTPATIENT
Start: 2019-07-17 | End: 2019-07-18

## 2019-07-17 RX ORDER — LACTULOSE 10 G/15ML
20 SOLUTION ORAL 3 TIMES DAILY
Status: DISCONTINUED | OUTPATIENT
Start: 2019-07-17 | End: 2019-07-25 | Stop reason: HOSPADM

## 2019-07-17 RX ADMIN — MAGNESIUM SULFATE HEPTAHYDRATE 1 G: 1 INJECTION, SOLUTION INTRAVENOUS at 11:55

## 2019-07-17 RX ADMIN — Medication 10 MEQ: at 20:42

## 2019-07-17 RX ADMIN — SODIUM CHLORIDE: 9 INJECTION, SOLUTION INTRAVENOUS at 08:22

## 2019-07-17 RX ADMIN — LACTULOSE: 10 SOLUTION ORAL at 17:07

## 2019-07-17 RX ADMIN — LACTULOSE: 10 SOLUTION ORAL at 21:30

## 2019-07-17 RX ADMIN — LACTULOSE: 10 SOLUTION ORAL at 13:14

## 2019-07-17 RX ADMIN — Medication 10 MEQ: at 23:21

## 2019-07-17 RX ADMIN — CEFTRIAXONE 1 G: 1 INJECTION, POWDER, FOR SOLUTION INTRAMUSCULAR; INTRAVENOUS at 17:07

## 2019-07-17 RX ADMIN — Medication 10 MEQ: at 04:30

## 2019-07-17 RX ADMIN — Medication 10 MEQ: at 21:30

## 2019-07-17 RX ADMIN — SODIUM CHLORIDE 1000 ML: 9 INJECTION, SOLUTION INTRAVENOUS at 02:49

## 2019-07-17 RX ADMIN — Medication 10 ML: at 10:18

## 2019-07-17 RX ADMIN — Medication 10 MEQ: at 18:40

## 2019-07-17 RX ADMIN — SODIUM CHLORIDE: 9 INJECTION, SOLUTION INTRAVENOUS at 20:45

## 2019-07-17 ASSESSMENT — PAIN SCALES - GENERAL
PAINLEVEL_OUTOF10: 0

## 2019-07-17 ASSESSMENT — PAIN SCALES - WONG BAKER: WONGBAKER_NUMERICALRESPONSE: 0

## 2019-07-17 NOTE — ED PROVIDER NOTES
on file    Highest education level: Not on file   Occupational History    Not on file   Social Needs    Financial resource strain: Not on file    Food insecurity:     Worry: Not on file     Inability: Not on file    Transportation needs:     Medical: Not on file     Non-medical: Not on file   Tobacco Use    Smoking status: Never Smoker    Smokeless tobacco: Never Used   Substance and Sexual Activity    Alcohol use: Yes     Alcohol/week: 1.0 standard drinks     Types: 1 Cans of beer per week     Comment: history of drinking a bottle of whiskey a day 1can a day    Drug use: No    Sexual activity: Not on file   Lifestyle    Physical activity:     Days per week: Not on file     Minutes per session: Not on file    Stress: Not on file   Relationships    Social connections:     Talks on phone: Not on file     Gets together: Not on file     Attends Zoroastrian service: Not on file     Active member of club or organization: Not on file     Attends meetings of clubs or organizations: Not on file     Relationship status: Not on file    Intimate partner violence:     Fear of current or ex partner: Not on file     Emotionally abused: Not on file     Physically abused: Not on file     Forced sexual activity: Not on file   Other Topics Concern    Not on file   Social History Narrative    Not on file     No current facility-administered medications for this encounter.       Current Outpatient Medications   Medication Sig Dispense Refill    rifaximin (XIFAXAN) 550 MG tablet Take 1 tablet by mouth 2 times daily 42 tablet 0    omeprazole (PRILOSEC) 20 MG delayed release capsule Take 20 mg by mouth nightly      potassium chloride (MICRO-K) 10 MEQ extended release capsule Take 50 mEq by mouth 2 times daily      lactulose (CHRONULAC) 10 GM/15ML solution Take 45 mLs by mouth 2 times daily  1    amiodarone (CORDARONE) 200 MG tablet Take 200 mg by mouth daily      DULoxetine (CYMBALTA) 30 MG extended release capsule Take by Dr. Fran Barth MD to [facility] on   7/17/2019 at 03:18. XR CHEST PORTABLE   Final Result   No acute disease. EKG INTERPRETATION  The Ekg interpreted by me shows  normal sinus rhythm with a rate of 64  Axis is   Normal  QTc is  Prolonged at 600  Intervals and Durations are unremarkable. ST Segments: nonspecific changes  No significant change from prior EKG dated 5/24/2019      PROCEDURES    ED COURSE/MDM  Hepatic encephalopathy, intercranial hemorrhage, subdural, anemia, UTI, pneumonia    Patient seen and evaluated. Patient is altered on arrival and unable to provide any further information on his history. Review of patient's chart from nursing facility does show that he has had hyperammonemia as well as history of liver cirrhosis. Patient likely having hepatic encephalopathy although will need full septic work-up as well as scan of the head. Patient vital signs stable on arrival.  Afebrile. Plan is for urinalysis, routine labs, lipase, troponin, lactic acid, ammonia, CT head, IV fluids and admission. ED Course as of Jul 17 0653   Wed Jul 17, 2019   0320 Received a call from radiology to notify me that patient CT head shows a left-sided hygroma which is approximately 3week old. No evidence of acute bleed or hemorrhage. Patient also has ammonia level of 155. IV fluids running. Consult placed to neurosurgery. [DS]   36 Spoke with Dr. Liam Vitale, neurosurgeon at [facility] to review patient's CT head with evidence of hygroma. Recommends monitoring patient and correcting the ammonia and platelet lefts. No acute intervention as there is no evidence of acute injury. [DS]   1569 Consult placed to Dr. Shireen Herrera, hospitalist for admission. Admission was accepted. [DS]      ED Course User Index  [DS] Tilda Cogan, MD         Patient was given scripts for the following medications. I counseled patient how to take these medications.    New Prescriptions    No medications on file     CRITICAL CARE TIME   Total Critical Care time was 33 minutes, excluding separately reportable procedures. There was a high probability of clinically significant/life threatening deterioration in the patient's condition which required my urgent intervention. CLINICAL IMPRESSION  1. Hepatic encephalopathy ([facility]    2. JAIME (acute kidney injury) ([facility]    3. Hygroma    4. Hypokalemia        Blood pressure 120/63, pulse 66, temperature 97.6 °F (36.4 °C), temperature source Axillary, resp. rate 13, height (P) 5' 6\" (1.676 m), weight (P) 195 lb 1.7 oz (88.5 kg), SpO2 95 %. DISPOSITION  Admitted    Disclaimer: All medical record entries made by [employer] dictation.       (Please note that this note was completed with a voice recognition program. Every attempt was made to edit the dictations, but inevitably there remain words that are mis-transcribed.)            Becky Lerma MD  07/17/19 5257

## 2019-07-17 NOTE — DISCHARGE INSTR - COC
(Axillary)   Resp 16   Ht 5' 6\" (1.676 m)   Wt 195 lb 1.7 oz (88.5 kg)   SpO2 96%   BMI 31.49 kg/m²     Last documented pain score (0-10 scale): Pain Level: 0  Last Weight:   Wt Readings from Last 1 Encounters:   07/17/19 195 lb 1.7 oz (88.5 kg)     Mental Status:  Alert and oriented x3 (person, place, situation, gives incorrect year)    IV Access:  - None    Nursing Mobility/ADLs:  Walking   Assisted  Transfer  Assisted  Bathing  Assisted  Dressing  Assisted  Toileting  Assisted  Feeding  Independent  Med [address] [place]  Med Delivery   whole    Wound Care Documentation and Therapy:   pt needs SOFIA hose on daily, positive orthostatic BP, use stedy for transfer      Elimination:  Continence:   · Bowel: Yes, incontinent at times   · Bladder: Yes  Urinary Catheter:  Suprapubic catheter    Colostomy/Ileostomy/Ileal Conduit: No       Date of Last BM: 7/24/19  No intake or output data in the 24 hours ending 07/17/19 1019  No intake/output data recorded. Safety Concerns:     History of Falls (last 30 days) and At Risk for Falls    Impairments/Disabilities:      None    Nutrition Therapy:  Current Nutrition Therapy:   - Oral Diet:  General    Routes of Feeding: Oral  Liquids: Thin Liquids  Daily Fluid Restriction: no  Last Modified Barium Swallow with Video (Video Swallowing Test): not done    Treatments at the Time of Hospital Discharge:   Respiratory Treatments: none  Oxygen Therapy:  is not on home oxygen therapy.   Ventilator:    - No ventilator support    Rehab Therapies: Physical Therapy and Occupational Therapy  Weight Bearing Status/Restrictions: No weight bearing restirctions  Other Medical Equipment (for information only, NOT a DME order):  walker  Other Treatments: ***    Patient's personal belongings (please select all that are sent with patient):  None    RN SIGNATURE:  Electronically signed by Shant Mancilla RN on 7/24/2019 at 5:14 PM      CASE MANAGEMENT/SOCIAL WORK SECTION    Inpatient Status
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
## 2019-07-17 NOTE — ED NOTES
Called staff at [id number] [facility] who report that pt is presently a resident there. He has a reported altered mental satus change over the last 3 days. Initially there were labs done at the facility, and pt was found to be hpokalemic and increased ammonia level of 193. At time of arrival in ER pt mumbles, able to state his name. Doesn't answer other questions.   +Moaning  Nursing home staff says that pt has not been able to complete ADL's yesterday, not normal mental status as per baseline     Sheryle Sabal, ASHISH  07/17/19 8586

## 2019-07-17 NOTE — ED NOTES
Bed: B-05  Expected date: 7/17/19  Expected time: 1:33 AM  Means of arrival: [facility]  Comments:  Quoc Jacobsen RN  07/17/19 5821

## 2019-07-18 ENCOUNTER — APPOINTMENT (OUTPATIENT)
Dept: CT IMAGING | Age: 69
DRG: 442 | End: 2019-07-18
Payer: MEDICARE

## 2019-07-18 LAB
ANION GAP SERPL CALCULATED.3IONS-SCNC: 11 MMOL/L (ref 3–16)
BASOPHILS ABSOLUTE: 0 K/UL (ref 0–0.2)
BASOPHILS RELATIVE PERCENT: 0.8 %
BUN BLDV-MCNC: 18 MG/DL (ref 7–20)
CALCIUM SERPL-MCNC: 9.5 MG/DL (ref 8.3–10.6)
CHLORIDE BLD-SCNC: 114 MMOL/L (ref 99–110)
CO2: 26 MMOL/L (ref 21–32)
CREAT SERPL-MCNC: 1.3 MG/DL (ref 0.8–1.3)
EOSINOPHILS ABSOLUTE: 0.1 K/UL (ref 0–0.6)
EOSINOPHILS RELATIVE PERCENT: 2.1 %
GFR AFRICAN AMERICAN: >60
GFR NON-AFRICAN AMERICAN: 55
GLUCOSE BLD-MCNC: 103 MG/DL (ref 70–99)
GLUCOSE BLD-MCNC: 93 MG/DL (ref 70–99)
GLUCOSE BLD-MCNC: 95 MG/DL (ref 70–99)
HCT VFR BLD CALC: 26.4 % (ref 40.5–52.5)
HEMOGLOBIN: 8.9 G/DL (ref 13.5–17.5)
LYMPHOCYTES ABSOLUTE: 1.5 K/UL (ref 1–5.1)
LYMPHOCYTES RELATIVE PERCENT: 41.2 %
MAGNESIUM: 1.8 MG/DL (ref 1.8–2.4)
MCH RBC QN AUTO: 35.8 PG (ref 26–34)
MCHC RBC AUTO-ENTMCNC: 33.5 G/DL (ref 31–36)
MCV RBC AUTO: 106.9 FL (ref 80–100)
MONOCYTES ABSOLUTE: 0.4 K/UL (ref 0–1.3)
MONOCYTES RELATIVE PERCENT: 12.2 %
NEUTROPHILS ABSOLUTE: 1.6 K/UL (ref 1.7–7.7)
NEUTROPHILS RELATIVE PERCENT: 43.7 %
PDW BLD-RTO: 17 % (ref 12.4–15.4)
PERFORMED ON: ABNORMAL
PERFORMED ON: NORMAL
PLATELET # BLD: 72 K/UL (ref 135–450)
PMV BLD AUTO: 9.3 FL (ref 5–10.5)
POTASSIUM SERPL-SCNC: 3.1 MMOL/L (ref 3.5–5.1)
RBC # BLD: 2.47 M/UL (ref 4.2–5.9)
SODIUM BLD-SCNC: 151 MMOL/L (ref 136–145)
WBC # BLD: 3.6 K/UL (ref 4–11)

## 2019-07-18 PROCEDURE — 70450 CT HEAD/BRAIN W/O DYE: CPT

## 2019-07-18 PROCEDURE — 6370000000 HC RX 637 (ALT 250 FOR IP): Performed by: INTERNAL MEDICINE

## 2019-07-18 PROCEDURE — 6360000002 HC RX W HCPCS: Performed by: INTERNAL MEDICINE

## 2019-07-18 PROCEDURE — 2580000003 HC RX 258: Performed by: INTERNAL MEDICINE

## 2019-07-18 PROCEDURE — 80048 BASIC METABOLIC PNL TOTAL CA: CPT

## 2019-07-18 PROCEDURE — 36415 COLL VENOUS BLD VENIPUNCTURE: CPT

## 2019-07-18 PROCEDURE — 1200000000 HC SEMI PRIVATE

## 2019-07-18 PROCEDURE — 85025 COMPLETE CBC W/AUTO DIFF WBC: CPT

## 2019-07-18 PROCEDURE — 94760 N-INVAS EAR/PLS OXIMETRY 1: CPT

## 2019-07-18 PROCEDURE — 83735 ASSAY OF MAGNESIUM: CPT

## 2019-07-18 RX ORDER — DEXTROSE AND SODIUM CHLORIDE 5; .45 G/100ML; G/100ML
INJECTION, SOLUTION INTRAVENOUS CONTINUOUS
Status: DISCONTINUED | OUTPATIENT
Start: 2019-07-18 | End: 2019-07-20

## 2019-07-18 RX ORDER — POTASSIUM CHLORIDE 1.5 G/1.77G
40 POWDER, FOR SOLUTION ORAL 2 TIMES DAILY
Status: DISCONTINUED | OUTPATIENT
Start: 2019-07-18 | End: 2019-07-18

## 2019-07-18 RX ADMIN — Medication 10 MEQ: at 00:55

## 2019-07-18 RX ADMIN — POTASSIUM BICARBONATE 40 MEQ: 782 TABLET, EFFERVESCENT ORAL at 20:32

## 2019-07-18 RX ADMIN — LACTULOSE 20 G: 20 SOLUTION ORAL at 20:32

## 2019-07-18 RX ADMIN — POTASSIUM BICARBONATE 40 MEQ: 782 TABLET, EFFERVESCENT ORAL at 14:28

## 2019-07-18 RX ADMIN — LACTULOSE 20 G: 20 SOLUTION ORAL at 14:28

## 2019-07-18 RX ADMIN — DEXTROSE AND SODIUM CHLORIDE: 5; 450 INJECTION, SOLUTION INTRAVENOUS at 20:33

## 2019-07-18 RX ADMIN — ENOXAPARIN SODIUM 40 MG: 40 INJECTION SUBCUTANEOUS at 11:01

## 2019-07-18 RX ADMIN — DEXTROSE AND SODIUM CHLORIDE: 5; 450 INJECTION, SOLUTION INTRAVENOUS at 11:01

## 2019-07-18 RX ADMIN — CEFTRIAXONE 1 G: 1 INJECTION, POWDER, FOR SOLUTION INTRAMUSCULAR; INTRAVENOUS at 17:43

## 2019-07-18 RX ADMIN — Medication 10 MEQ: at 02:42

## 2019-07-18 RX ADMIN — RIFAXIMIN 550 MG: 550 TABLET ORAL at 20:32

## 2019-07-18 RX ADMIN — LACTULOSE: 10 SOLUTION ORAL at 11:01

## 2019-07-18 ASSESSMENT — PAIN SCALES - PAIN ASSESSMENT IN ADVANCED DEMENTIA (PAINAD)
NEGVOCALIZATION: 1
CONSOLABILITY: 0
BODYLANGUAGE: 0
TOTALSCORE: 1
FACIALEXPRESSION: 0
BREATHING: 0

## 2019-07-18 ASSESSMENT — PAIN SCALES - GENERAL
PAINLEVEL_OUTOF10: 0
PAINLEVEL_OUTOF10: 0
PAINLEVEL_OUTOF10: 1

## 2019-07-18 NOTE — PROGRESS NOTES
aldactone and torsemide     Pancytopenia  - likely cirrhosis induced  - no signs of bleeding  - monitor     Hypokalemia  - replete  - recheck still low  - give oral K today    Hypomagnesemia  - replete  - recheck in AM    DVT Prophylaxis: lovenox  Diet: Diet NPO Effective Now  Code Status: Full Code    PT/OT Eval Status:  Will need once more alert    Alisha Avitia MD

## 2019-07-18 NOTE — PROGRESS NOTES
Gastroenterology Note  Patient:   Mary Pickens   :    1950   Facility:   [facility]   Date:     2019  Consultant:   Liliya Gustafson MD      Subjective:     77 y.o. male admitted 2019 with Hepatic encephalopathy ([facility] [K72.90] and seen for hepatic encephalopathy  .     Present  Diet Order: DIET FULL LIQUID;      Current Medications include:   Scheduled Meds:   potassium bicarb-citric acid  40 mEq Oral BID    [Held by provider] amiodarone  200 mg Oral Daily    rifaximin  550 mg Oral BID    sodium chloride flush  10 mL Intravenous 2 times per day    enoxaparin  40 mg Subcutaneous Daily    lactulose  20 g Oral TID    lactulose enema   Rectal TID    cefTRIAXone (ROCEPHIN) IV  1 g Intravenous Q24H     Continuous Infusions:   dextrose 5 % and 0.45 % NaCl 100 mL/hr at 19 1101     PRN Meds:.sodium chloride flush, magnesium hydroxide, ondansetron    Allergies: No Known Allergies    Objective:   Vital Signs:  Temp (24hrs), Av.9 °F (36.6 °C), Min:96.3 °F (35.7 °C), Max:98.6 °F (37 °C)     Systolic (96KOG), IPK:411 , Min:105 , DDM:818      Diastolic (20RNK), QAF:95, Min:56, Max:74     Pulse  Av.7  Min: 61  Max: 68  /71   Pulse 61   Temp 96.3 °F (35.7 °C) (Oral)   Resp 17   Ht 5' 6\" (1.676 m)   Wt 190 lb 11.2 oz (86.5 kg)   SpO2 97%   BMI 30.78 kg/m²      Physical Exam:   General appearance: alert and appears stated age  Lungs: clear to auscultation bilaterally  Heart: regular rate and rhythm, S1, S2 normal, no murmur, click, rub or gallop  Abdomen: soft, non-tender; bowel sounds normal; no masses,  no organomegaly    Lab and Imaging Review   Recent Labs     19  0231 19  0232 19  0242 19  0505   WBC  --  3.4*  --  3.6*   HGB  --  9.3*  --  8.9*   MCV  --  106.3*  --  106.9*   PLT  --  75*  --  72*     --   --  151*   K 2.9*  --   --  3.1*     --   --  114*   CO2 32  --   --  26   BUN 17  --   --  18

## 2019-07-19 LAB
ALBUMIN SERPL-MCNC: 2.3 G/DL (ref 3.4–5)
AMMONIA: 40 UMOL/L (ref 16–60)
ANION GAP SERPL CALCULATED.3IONS-SCNC: 10 MMOL/L (ref 3–16)
BUN BLDV-MCNC: 15 MG/DL (ref 7–20)
CALCIUM SERPL-MCNC: 9.3 MG/DL (ref 8.3–10.6)
CHLORIDE BLD-SCNC: 111 MMOL/L (ref 99–110)
CO2: 27 MMOL/L (ref 21–32)
CREAT SERPL-MCNC: 1.3 MG/DL (ref 0.8–1.3)
GFR AFRICAN AMERICAN: >60
GFR NON-AFRICAN AMERICAN: 55
GLUCOSE BLD-MCNC: 108 MG/DL (ref 70–99)
PHOSPHORUS: 3 MG/DL (ref 2.5–4.9)
POTASSIUM SERPL-SCNC: 3.1 MMOL/L (ref 3.5–5.1)
SODIUM BLD-SCNC: 148 MMOL/L (ref 136–145)

## 2019-07-19 PROCEDURE — 2580000003 HC RX 258: Performed by: INTERNAL MEDICINE

## 2019-07-19 PROCEDURE — 36415 COLL VENOUS BLD VENIPUNCTURE: CPT

## 2019-07-19 PROCEDURE — 82140 ASSAY OF AMMONIA: CPT

## 2019-07-19 PROCEDURE — 6370000000 HC RX 637 (ALT 250 FOR IP): Performed by: INTERNAL MEDICINE

## 2019-07-19 PROCEDURE — 1200000000 HC SEMI PRIVATE

## 2019-07-19 PROCEDURE — 6360000002 HC RX W HCPCS: Performed by: INTERNAL MEDICINE

## 2019-07-19 PROCEDURE — 80069 RENAL FUNCTION PANEL: CPT

## 2019-07-19 RX ADMIN — POTASSIUM BICARBONATE 40 MEQ: 782 TABLET, EFFERVESCENT ORAL at 21:39

## 2019-07-19 RX ADMIN — DEXTROSE AND SODIUM CHLORIDE: 5; 450 INJECTION, SOLUTION INTRAVENOUS at 06:41

## 2019-07-19 RX ADMIN — LACTULOSE 20 G: 20 SOLUTION ORAL at 09:38

## 2019-07-19 RX ADMIN — Medication 10 ML: at 09:39

## 2019-07-19 RX ADMIN — LACTULOSE 20 G: 20 SOLUTION ORAL at 15:17

## 2019-07-19 RX ADMIN — RIFAXIMIN 550 MG: 550 TABLET ORAL at 09:38

## 2019-07-19 RX ADMIN — CEFTRIAXONE 1 G: 1 INJECTION, POWDER, FOR SOLUTION INTRAMUSCULAR; INTRAVENOUS at 18:17

## 2019-07-19 RX ADMIN — RIFAXIMIN 550 MG: 550 TABLET ORAL at 21:39

## 2019-07-19 RX ADMIN — POTASSIUM BICARBONATE 40 MEQ: 782 TABLET, EFFERVESCENT ORAL at 09:37

## 2019-07-19 RX ADMIN — ENOXAPARIN SODIUM 40 MG: 40 INJECTION SUBCUTANEOUS at 09:38

## 2019-07-19 RX ADMIN — LACTULOSE 20 G: 20 SOLUTION ORAL at 21:39

## 2019-07-19 RX ADMIN — DEXTROSE AND SODIUM CHLORIDE: 5; 450 INJECTION, SOLUTION INTRAVENOUS at 18:17

## 2019-07-19 ASSESSMENT — PAIN SCALES - GENERAL
PAINLEVEL_OUTOF10: 0

## 2019-07-19 NOTE — PROGRESS NOTES
bilaterally. Full range of motion without deformity. Skin: Skin color, texture, turgor normal.  No rashes or lesions. Neurologic:  Neurovascularly intact without any focal sensory/motor deficits. Cranial nerves: II-XII intact, grossly non-focal.  Psychiatric: Alert and oriented x3  Capillary Refill: Brisk,< 3 seconds   Peripheral Pulses: +2 palpable, equal bilaterally       Labs:   Recent Labs     07/17/19  0232 07/18/19  0505   WBC 3.4* 3.6*   HGB 9.3* 8.9*   HCT 27.9* 26.4*   PLT 75* 72*     Recent Labs     07/17/19  0231 07/18/19  0505 07/19/19  0448    151* 148*   K 2.9* 3.1* 3.1*    114* 111*   CO2 32 26 27   BUN 17 18 15   CREATININE 1.4* 1.3 1.3   CALCIUM 9.6 9.5 9.3   PHOS  --   --  3.0     Recent Labs     07/17/19  0231   AST 45*   ALT 22   BILITOT 1.8*   ALKPHOS 114     No results for input(s): INR in the last 72 hours. Recent Labs     07/17/19  0231   TROPONINI <0.01       Urinalysis:      Lab Results   Component Value Date    NITRU Negative 07/02/2019    WBCUA 227 07/02/2019    BACTERIA 4+ 07/02/2019    RBCUA 9 07/02/2019    BLOODU SMALL 07/02/2019    SPECGRAV 1.015 07/02/2019    GLUCOSEU Negative 07/02/2019    GLUCOSEU NEGATIVE 06/27/2011       Radiology:  CT Head WO Contrast   Final Result   No change in subdural hematoma on the left. Majority of the fluid collection   is hypodense suggesting that it is remote in time course. Intervening areas   of hyperdensity suggest superimposed subacute hemorrhage      Atrophy with periventricular and scattered frontal parietal white matter   disease, likely due to small-vessel ischemic change         CT Head WO Contrast   Final Result   Findings suggest subacute hemorrhage within a pre-existing left subdural   hygroma. Critical results were called by Dr. Romana Heimlich, MD to [facility] on   7/17/2019 at 03:18. XR CHEST PORTABLE   Final Result   No acute disease.                  Assessment/Plan:    Hepatic Encephalopathy  -

## 2019-07-19 NOTE — PROGRESS NOTES
17  --   --  18 15   CREATININE 1.4*  --   --  1.3 1.3   GLUCOSE 119*  --   --  93 108*   CALCIUM 9.6  --   --  9.5 9.3   PROT 6.1*  --   --   --   --    LABALBU 2.6*  --   --   --  2.3*   AST 45*  --   --   --   --    ALT 22  --   --   --   --    ALKPHOS 114  --   --   --   --    BILITOT 1.8*  --   --   --   --    AMMONIA  --   --  155*  --  40   LIPASE 37.0  --   --   --   --    MG 1.70*  --   --  1.80  --        Assessment:     Patient Active Problem List    Diagnosis Date Noted    Hepatic encephalopathy ([facility] 05/24/2019    Hyperammonemia (HCC) 05/24/2019    Thrombocytopenia (HCC) 05/24/2019    Hypokalemia 05/24/2019    Hypomagnesemia 05/24/2019    Hyperbilirubinemia 05/24/2019    Urinary tract infection associated with cystostomy catheter ([facility] 05/24/2019    Generalized weakness 05/24/2019    Paroxysmal atrial fibrillation (HCC) 03/36/1562    Alcoholic cirrhosis (HCC)     Dementia        Plan:   1. He is not confused. Ammonia is normal. He is eating. He wants to go back to the [facility]. Continue lactulose and Xifaxan. Will sign off.

## 2019-07-20 LAB
ALBUMIN SERPL-MCNC: 2 G/DL (ref 3.4–5)
ANION GAP SERPL CALCULATED.3IONS-SCNC: 8 MMOL/L (ref 3–16)
BACTERIA: ABNORMAL /HPF
BILIRUBIN URINE: NEGATIVE
BLOOD, URINE: NEGATIVE
BUN BLDV-MCNC: 9 MG/DL (ref 7–20)
CALCIUM SERPL-MCNC: 8.6 MG/DL (ref 8.3–10.6)
CHLORIDE BLD-SCNC: 105 MMOL/L (ref 99–110)
CLARITY: ABNORMAL
CO2: 27 MMOL/L (ref 21–32)
COLOR: YELLOW
COMMENT UA: ABNORMAL
CREAT SERPL-MCNC: 1 MG/DL (ref 0.8–1.3)
EPITHELIAL CELLS, UA: 2 /HPF (ref 0–5)
GFR AFRICAN AMERICAN: >60
GFR NON-AFRICAN AMERICAN: >60
GLUCOSE BLD-MCNC: 109 MG/DL (ref 70–99)
GLUCOSE URINE: NEGATIVE MG/DL
HYALINE CASTS: 2 /LPF (ref 0–8)
KETONES, URINE: NEGATIVE MG/DL
LEUKOCYTE ESTERASE, URINE: ABNORMAL
MICROSCOPIC EXAMINATION: YES
NITRITE, URINE: NEGATIVE
PH UA: 6.5 (ref 5–8)
PHOSPHORUS: 3.2 MG/DL (ref 2.5–4.9)
POTASSIUM SERPL-SCNC: 3.3 MMOL/L (ref 3.5–5.1)
PROTEIN UA: NEGATIVE MG/DL
RBC UA: ABNORMAL /HPF (ref 0–2)
SODIUM BLD-SCNC: 140 MMOL/L (ref 136–145)
SPECIFIC GRAVITY UA: 1.02 (ref 1–1.03)
URINE REFLEX TO CULTURE: YES
URINE TYPE: ABNORMAL
UROBILINOGEN, URINE: 0.2 E.U./DL
WBC UA: 7 /HPF (ref 0–5)
YEAST: PRESENT /HPF

## 2019-07-20 PROCEDURE — 6360000002 HC RX W HCPCS: Performed by: INTERNAL MEDICINE

## 2019-07-20 PROCEDURE — 6370000000 HC RX 637 (ALT 250 FOR IP): Performed by: INTERNAL MEDICINE

## 2019-07-20 PROCEDURE — 81001 URINALYSIS AUTO W/SCOPE: CPT

## 2019-07-20 PROCEDURE — 94760 N-INVAS EAR/PLS OXIMETRY 1: CPT

## 2019-07-20 PROCEDURE — 1200000000 HC SEMI PRIVATE

## 2019-07-20 PROCEDURE — 97535 SELF CARE MNGMENT TRAINING: CPT

## 2019-07-20 PROCEDURE — 87186 SC STD MICRODIL/AGAR DIL: CPT

## 2019-07-20 PROCEDURE — 97166 OT EVAL MOD COMPLEX 45 MIN: CPT

## 2019-07-20 PROCEDURE — 97530 THERAPEUTIC ACTIVITIES: CPT | Performed by: PHYSICAL THERAPIST

## 2019-07-20 PROCEDURE — 87077 CULTURE AEROBIC IDENTIFY: CPT

## 2019-07-20 PROCEDURE — 87086 URINE CULTURE/COLONY COUNT: CPT

## 2019-07-20 PROCEDURE — 80069 RENAL FUNCTION PANEL: CPT

## 2019-07-20 PROCEDURE — 2580000003 HC RX 258: Performed by: INTERNAL MEDICINE

## 2019-07-20 PROCEDURE — 36415 COLL VENOUS BLD VENIPUNCTURE: CPT

## 2019-07-20 PROCEDURE — 97162 PT EVAL MOD COMPLEX 30 MIN: CPT | Performed by: PHYSICAL THERAPIST

## 2019-07-20 RX ADMIN — Medication 10 ML: at 20:57

## 2019-07-20 RX ADMIN — DEXTROSE AND SODIUM CHLORIDE: 5; 450 INJECTION, SOLUTION INTRAVENOUS at 04:56

## 2019-07-20 RX ADMIN — POTASSIUM BICARBONATE 40 MEQ: 782 TABLET, EFFERVESCENT ORAL at 08:58

## 2019-07-20 RX ADMIN — LACTULOSE 20 G: 20 SOLUTION ORAL at 08:58

## 2019-07-20 RX ADMIN — POTASSIUM BICARBONATE 40 MEQ: 782 TABLET, EFFERVESCENT ORAL at 20:58

## 2019-07-20 RX ADMIN — CEFTRIAXONE 1 G: 1 INJECTION, POWDER, FOR SOLUTION INTRAMUSCULAR; INTRAVENOUS at 16:46

## 2019-07-20 RX ADMIN — ENOXAPARIN SODIUM 40 MG: 40 INJECTION SUBCUTANEOUS at 08:58

## 2019-07-20 RX ADMIN — RIFAXIMIN 550 MG: 550 TABLET ORAL at 20:57

## 2019-07-20 RX ADMIN — LACTULOSE 20 G: 20 SOLUTION ORAL at 13:55

## 2019-07-20 RX ADMIN — RIFAXIMIN 550 MG: 550 TABLET ORAL at 08:58

## 2019-07-20 RX ADMIN — LACTULOSE 20 G: 20 SOLUTION ORAL at 20:57

## 2019-07-20 ASSESSMENT — PAIN SCALES - GENERAL
PAINLEVEL_OUTOF10: 0
PAINLEVEL_OUTOF10: 0

## 2019-07-20 NOTE — PROGRESS NOTES
awareness of need for safety  Insights: Decreased awareness of deficits  Initiation: Requires cues for some  Sequencing: Requires cues for some    Objective          AROM RLE (degrees)  RLE AROM: WFL  AROM LLE (degrees)  LLE AROM : WFL  Strength RLE  Strength RLE: WFL  Strength LLE  Strength LLE: WFL     Sensation  Overall Sensation Status: WFL  Bed mobility  Supine to Sit: Contact guard assistance(with HOB elevated)  Transfers  Sit to Stand: Contact guard assistance  Stand to sit: Contact guard assistance  Comment: verbal cues for hand placement  Ambulation  Ambulation?: Yes  Ambulation 1  Surface: level tile  Device: Standard Walker  Assistance: Contact guard assistance;Minimal assistance  Quality of Gait: slighlty unsteady; needed assist for maneuvering walker and for balance  Distance: 3-4' to [facility] chair  Comments: assisted with positioning in chair for comfort and pressure relief; LEs elevated  Stairs/Curb  Stairs?: No     Balance  Sitting - Static: Good  Sitting - Dynamic: Fair;+  Standing - Static: Fair  Standing - Dynamic: [address]  Times per week: 3-5  Current Treatment Recommendations: Functional Mobility Training  Safety Devices  Type of devices: Call light within reach, Chair alarm in place, Left in chair, Nurse notified      AM-PAC Score  AM-PAC Inpatient Mobility Raw Score : 17 (07/20/19 1034)  AM-PAC Inpatient T-Scale Score : 42.13 (07/20/19 1034)  Mobility Inpatient CMS 0-100% Score: 50.57 (07/20/19 1034)  Mobility Inpatient CMS G-Code Modifier : CK (07/20/19 1034)          Goals  Short term goals  Time Frame for Short term goals: by discharge  Short term goal 1: bed mob   Short term goal 2: transfers sup  Short term goal 3: amb 100' with RW SBA  Patient Goals   Patient goals : pt did not state a goal       Therapy Time   Individual Concurrent Group Co-treatment   Time In 1000         Time Out 0399         Minutes 33              If patient is discharged prior to the next physical therapy visit;  Please see last written PT note for discharge status.     Haleigh Romero, PT, [id number]   HALEIGH ROMERO, PT

## 2019-07-21 LAB
ALBUMIN SERPL-MCNC: 2.3 G/DL (ref 3.4–5)
ANION GAP SERPL CALCULATED.3IONS-SCNC: 6 MMOL/L (ref 3–16)
BUN BLDV-MCNC: 8 MG/DL (ref 7–20)
CALCIUM SERPL-MCNC: 8.7 MG/DL (ref 8.3–10.6)
CHLORIDE BLD-SCNC: 106 MMOL/L (ref 99–110)
CO2: 28 MMOL/L (ref 21–32)
CREAT SERPL-MCNC: 0.9 MG/DL (ref 0.8–1.3)
GFR AFRICAN AMERICAN: >60
GFR NON-AFRICAN AMERICAN: >60
GLUCOSE BLD-MCNC: 99 MG/DL (ref 70–99)
PHOSPHORUS: 3 MG/DL (ref 2.5–4.9)
POTASSIUM SERPL-SCNC: 3.5 MMOL/L (ref 3.5–5.1)
SODIUM BLD-SCNC: 140 MMOL/L (ref 136–145)

## 2019-07-21 PROCEDURE — 36415 COLL VENOUS BLD VENIPUNCTURE: CPT

## 2019-07-21 PROCEDURE — 94760 N-INVAS EAR/PLS OXIMETRY 1: CPT

## 2019-07-21 PROCEDURE — 6370000000 HC RX 637 (ALT 250 FOR IP): Performed by: INTERNAL MEDICINE

## 2019-07-21 PROCEDURE — 1200000000 HC SEMI PRIVATE

## 2019-07-21 PROCEDURE — 6360000002 HC RX W HCPCS: Performed by: INTERNAL MEDICINE

## 2019-07-21 PROCEDURE — 2580000003 HC RX 258: Performed by: INTERNAL MEDICINE

## 2019-07-21 PROCEDURE — 80069 RENAL FUNCTION PANEL: CPT

## 2019-07-21 RX ORDER — FLUCONAZOLE 2 MG/ML
200 INJECTION, SOLUTION INTRAVENOUS EVERY 24 HOURS
Status: COMPLETED | OUTPATIENT
Start: 2019-07-21 | End: 2019-07-23

## 2019-07-21 RX ADMIN — Medication 10 ML: at 09:09

## 2019-07-21 RX ADMIN — RIFAXIMIN 550 MG: 550 TABLET ORAL at 09:09

## 2019-07-21 RX ADMIN — MEROPENEM 500 MG: 500 INJECTION, POWDER, FOR SOLUTION INTRAVENOUS at 10:26

## 2019-07-21 RX ADMIN — FLUCONAZOLE, SODIUM CHLORIDE 200 MG: 2 INJECTION INTRAVENOUS at 09:08

## 2019-07-21 RX ADMIN — LACTULOSE 20 G: 20 SOLUTION ORAL at 20:44

## 2019-07-21 RX ADMIN — MEROPENEM 500 MG: 500 INJECTION, POWDER, FOR SOLUTION INTRAVENOUS at 15:35

## 2019-07-21 RX ADMIN — LACTULOSE 20 G: 20 SOLUTION ORAL at 14:46

## 2019-07-21 RX ADMIN — Medication 10 ML: at 20:44

## 2019-07-21 RX ADMIN — ENOXAPARIN SODIUM 40 MG: 40 INJECTION SUBCUTANEOUS at 09:09

## 2019-07-21 RX ADMIN — POTASSIUM BICARBONATE 40 MEQ: 782 TABLET, EFFERVESCENT ORAL at 09:09

## 2019-07-21 RX ADMIN — RIFAXIMIN 550 MG: 550 TABLET ORAL at 20:44

## 2019-07-21 RX ADMIN — AMIODARONE HYDROCHLORIDE 200 MG: 200 TABLET ORAL at 09:09

## 2019-07-21 RX ADMIN — MEROPENEM 500 MG: 500 INJECTION, POWDER, FOR SOLUTION INTRAVENOUS at 20:44

## 2019-07-21 RX ADMIN — POTASSIUM BICARBONATE 40 MEQ: 782 TABLET, EFFERVESCENT ORAL at 20:44

## 2019-07-21 RX ADMIN — LACTULOSE 20 G: 20 SOLUTION ORAL at 09:09

## 2019-07-21 ASSESSMENT — PAIN SCALES - GENERAL
PAINLEVEL_OUTOF10: 0
PAINLEVEL_OUTOF10: 0

## 2019-07-22 LAB
ALBUMIN SERPL-MCNC: 2.3 G/DL (ref 3.4–5)
ANION GAP SERPL CALCULATED.3IONS-SCNC: 7 MMOL/L (ref 3–16)
BLOOD CULTURE, ROUTINE: NORMAL
BUN BLDV-MCNC: 8 MG/DL (ref 7–20)
CALCIUM SERPL-MCNC: 8.6 MG/DL (ref 8.3–10.6)
CHLORIDE BLD-SCNC: 110 MMOL/L (ref 99–110)
CO2: 25 MMOL/L (ref 21–32)
CREAT SERPL-MCNC: 0.8 MG/DL (ref 0.8–1.3)
CULTURE, BLOOD 2: NORMAL
GFR AFRICAN AMERICAN: >60
GFR NON-AFRICAN AMERICAN: >60
GLUCOSE BLD-MCNC: 96 MG/DL (ref 70–99)
ORGANISM: ABNORMAL
ORGANISM: ABNORMAL
PHOSPHORUS: 2.9 MG/DL (ref 2.5–4.9)
POTASSIUM SERPL-SCNC: 3.8 MMOL/L (ref 3.5–5.1)
SODIUM BLD-SCNC: 142 MMOL/L (ref 136–145)
URINE CULTURE, ROUTINE: ABNORMAL

## 2019-07-22 PROCEDURE — 2580000003 HC RX 258: Performed by: INTERNAL MEDICINE

## 2019-07-22 PROCEDURE — 6360000002 HC RX W HCPCS: Performed by: INTERNAL MEDICINE

## 2019-07-22 PROCEDURE — 80069 RENAL FUNCTION PANEL: CPT

## 2019-07-22 PROCEDURE — 97530 THERAPEUTIC ACTIVITIES: CPT

## 2019-07-22 PROCEDURE — 97110 THERAPEUTIC EXERCISES: CPT

## 2019-07-22 PROCEDURE — 36415 COLL VENOUS BLD VENIPUNCTURE: CPT

## 2019-07-22 PROCEDURE — 1200000000 HC SEMI PRIVATE

## 2019-07-22 PROCEDURE — 6370000000 HC RX 637 (ALT 250 FOR IP): Performed by: INTERNAL MEDICINE

## 2019-07-22 RX ORDER — LINEZOLID 600 MG/1
600 TABLET, FILM COATED ORAL EVERY 12 HOURS SCHEDULED
Status: DISCONTINUED | OUTPATIENT
Start: 2019-07-22 | End: 2019-07-25 | Stop reason: HOSPADM

## 2019-07-22 RX ORDER — TORSEMIDE 20 MG/1
20 TABLET ORAL DAILY
Status: DISCONTINUED | OUTPATIENT
Start: 2019-07-22 | End: 2019-07-25 | Stop reason: HOSPADM

## 2019-07-22 RX ADMIN — TORSEMIDE 20 MG: 20 TABLET ORAL at 09:12

## 2019-07-22 RX ADMIN — LACTULOSE 20 G: 20 SOLUTION ORAL at 09:00

## 2019-07-22 RX ADMIN — Medication 10 ML: at 08:59

## 2019-07-22 RX ADMIN — LACTULOSE 20 G: 20 SOLUTION ORAL at 22:32

## 2019-07-22 RX ADMIN — MEROPENEM 500 MG: 500 INJECTION, POWDER, FOR SOLUTION INTRAVENOUS at 04:08

## 2019-07-22 RX ADMIN — LINEZOLID 600 MG: 600 TABLET, FILM COATED ORAL at 09:12

## 2019-07-22 RX ADMIN — LINEZOLID 600 MG: 600 TABLET, FILM COATED ORAL at 22:32

## 2019-07-22 RX ADMIN — RIFAXIMIN 550 MG: 550 TABLET ORAL at 09:00

## 2019-07-22 RX ADMIN — FLUCONAZOLE, SODIUM CHLORIDE 200 MG: 2 INJECTION INTRAVENOUS at 08:51

## 2019-07-22 RX ADMIN — AMIODARONE HYDROCHLORIDE 200 MG: 200 TABLET ORAL at 09:00

## 2019-07-22 RX ADMIN — POTASSIUM BICARBONATE 40 MEQ: 782 TABLET, EFFERVESCENT ORAL at 09:00

## 2019-07-22 RX ADMIN — Medication 10 ML: at 22:33

## 2019-07-22 RX ADMIN — ENOXAPARIN SODIUM 40 MG: 40 INJECTION SUBCUTANEOUS at 09:00

## 2019-07-22 RX ADMIN — RIFAXIMIN 550 MG: 550 TABLET ORAL at 22:32

## 2019-07-22 RX ADMIN — POTASSIUM BICARBONATE 40 MEQ: 782 TABLET, EFFERVESCENT ORAL at 22:32

## 2019-07-22 RX ADMIN — MEROPENEM 500 MG: 500 INJECTION, POWDER, FOR SOLUTION INTRAVENOUS at 08:50

## 2019-07-22 RX ADMIN — LACTULOSE 20 G: 20 SOLUTION ORAL at 13:43

## 2019-07-22 ASSESSMENT — PAIN SCALES - GENERAL
PAINLEVEL_OUTOF10: 0

## 2019-07-22 NOTE — PROGRESS NOTES
Occupational Therapy  Facility/Department: [facility] 5W PROGRESSIVE CARE  Daily Treatment Note  Let this serve as discharge note if patient is discharged prior to next OT session  NAME: Hilary Goel  : 1950  MRN: [id number]    Date of Service: 2019    Discharge Recommendations:  3-5 sessions per week(patient is planning on returning back to [facility] )    Amena scored a 15/24 on the AM-PAC ADL Inpatient form. Current research shows that an AM-PAC score of 17 or less is typically not associated with a discharge to the patient's home setting. Based on the patients AM-PAC score and their current ADL deficits, it is recommended that the patient have 3-5 sessions per week of Occupational Therapy at d/c to increase the patients independence. Assessment   Performance deficits / Impairments: Decreased functional mobility ; Decreased cognition;Decreased ADL status  Assessment: Patient is pleasant and cooperative. Patient continues with confusion, yet is able to follow directions and engage in therapy. Patient reports legs feeling weak and unsteady during fxl mobility. Cont per POC. Prognosis: Fair;Good  Patient Education: safe transfers, reasoning for tasks   REQUIRES OT FOLLOW UP: Yes  Activity Tolerance: Patient Tolerated treatment well  Safety Devices in place: Yes  Type of devices: Call light within reach; Chair alarm in place; Left in chair;Gait belt;Nurse notified(ASHISH Milan notified )       Patient Diagnosis(es): The primary encounter diagnosis was Hepatic encephalopathy ([address] Diagnoses of JAIME (acute kidney injury) ([address] Hygroma, and Hypokalemia were also pertinent to this visit.       has a past medical history of Abnormality of gait, Alcohol dependence ([address] Alcoholic cirrhosis ([address] Anxiety, Arthritis, Basal cell carcinoma, Cerebral artery occlusion with cerebral infarction ([address] Circulation problem, Dementia, Depressive disorder, GERD (gastroesophageal reflux disease), Hypomagnesemia, Hypopotassemia, MDRO (multiple drug resistant organisms) resistance, SOB (shortness of breath), Spinal stenosis, Suicidal behavior, Syncope, Thrombocytopenia (HCC), Traumatic brain injury ([facility] [address] [facility] VRE (vancomycin resistant enterococcus) culture positive, Wears dentures, Wears glasses, and Wheezing. has a past surgical history that includes Upper gastrointestinal endoscopy; Colonoscopy; Upper gastrointestinal endoscopy (01/04/2018); and Colonoscopy (08/22/2018). Restrictions  Restrictions/Precautions: Contact Precautions(MDRO)  Other position/activity restrictions: IV attached    Subjective  Chart Reviewed: Yes  Response to previous treatment: Patient with no complaints from previous session  Family / Caregiver Present: No  Diagnosis: AMS, hepatic encephalopathy  Subjective: Patient met b/s, lying supine, pleasant and agreeable to OT treat, does not report or indicate any pain     Orientation  Overall Orientation Status: Impaired  Orientation Level: Oriented to person;Oriented to place; Disoriented to time;Disoriented to situation    Objective   Balance  Sitting Balance: Supervision  Standing Balance: Contact guard assistance    Transfers  Sit to stand: (CGA from recliner and EOB, Min A from couch )  Stand to sit: Contact guard assistance    Functional Mobility  Functional - Mobility Device: Rolling Walker  Activity: Other  Assist Level: Contact guard assistance  Functional Mobility Comments: bed-recliner-couch-recliner with RW with CGA, legs shaky with patient reporting they feel weak when completing fxl mobility throughout room     Bed mobility  Supine to Sit: Stand by assistance(HOB up, use of HR )  Scooting: Stand by assistance    Cognition  Overall Cognitive Status: Exceptions  Following Commands:  Follows one step commands with repetition  Attention Span: Attends with cues to redirect  Memory: Decreased recall of biographical Information;Decreased recall of recent events  Safety Judgement:

## 2019-07-22 NOTE — PROGRESS NOTES
lactulose enema with improvement of mentation. Patient also had a CT of his head which showed a hygroma with possible subacute hemorrhage. Neurology was consulted and they read the scan and said that there was no hemorrhage. Patient mentation improved with lactulose and rifaximin. He was also found to have enterococcus UTI and was switched to linezolid.     Hepatic Encephalopathy- improved   -ct po lactulose and rifaximin      Alcoholic Liver Cirrhosis  - with hemorrhoids and esophageal varices  - GI consulted  - monitor  - restart  torsemide    UTI 2/2 Enterococcus  - switch to linezolid day 1      Pancytopenia  - likely cirrhosis induced  - no signs of bleeding  - monitor     Hypokalemia  - replete  - recheck stable    Hypomagnesemia  - replete  - resolved    DVT Prophylaxis: lovenox  Diet: DIET GENERAL;  Dietary Nutrition Supplements: Other Oral Supplement (see comment)  Code Status: Full Code    PT/OT Eval Status: Ordered    Dispo - tomorrow    Elmira Britton MD

## 2019-07-23 LAB
ALBUMIN SERPL-MCNC: 2.3 G/DL (ref 3.4–5)
ANION GAP SERPL CALCULATED.3IONS-SCNC: 8 MMOL/L (ref 3–16)
BUN BLDV-MCNC: 11 MG/DL (ref 7–20)
CALCIUM SERPL-MCNC: 8.5 MG/DL (ref 8.3–10.6)
CHLORIDE BLD-SCNC: 106 MMOL/L (ref 99–110)
CO2: 27 MMOL/L (ref 21–32)
CREAT SERPL-MCNC: 1.3 MG/DL (ref 0.8–1.3)
GFR AFRICAN AMERICAN: >60
GFR NON-AFRICAN AMERICAN: 55
GLUCOSE BLD-MCNC: 94 MG/DL (ref 70–99)
PHOSPHORUS: 3 MG/DL (ref 2.5–4.9)
PLATELET # BLD: 67 K/UL (ref 135–450)
POTASSIUM SERPL-SCNC: 3.6 MMOL/L (ref 3.5–5.1)
SODIUM BLD-SCNC: 141 MMOL/L (ref 136–145)

## 2019-07-23 PROCEDURE — 1200000000 HC SEMI PRIVATE

## 2019-07-23 PROCEDURE — 97116 GAIT TRAINING THERAPY: CPT

## 2019-07-23 PROCEDURE — 6370000000 HC RX 637 (ALT 250 FOR IP): Performed by: INTERNAL MEDICINE

## 2019-07-23 PROCEDURE — 94760 N-INVAS EAR/PLS OXIMETRY 1: CPT

## 2019-07-23 PROCEDURE — 97530 THERAPEUTIC ACTIVITIES: CPT

## 2019-07-23 PROCEDURE — 85049 AUTOMATED PLATELET COUNT: CPT

## 2019-07-23 PROCEDURE — 2580000003 HC RX 258: Performed by: INTERNAL MEDICINE

## 2019-07-23 PROCEDURE — 80069 RENAL FUNCTION PANEL: CPT

## 2019-07-23 PROCEDURE — 6360000002 HC RX W HCPCS: Performed by: INTERNAL MEDICINE

## 2019-07-23 PROCEDURE — 36415 COLL VENOUS BLD VENIPUNCTURE: CPT

## 2019-07-23 PROCEDURE — 97535 SELF CARE MNGMENT TRAINING: CPT

## 2019-07-23 RX ORDER — LACTULOSE 10 G/15ML
20 SOLUTION ORAL 3 TIMES DAILY
Qty: 2700 ML | Refills: 0 | Status: SHIPPED | OUTPATIENT
Start: 2019-07-23 | End: 2019-08-22

## 2019-07-23 RX ORDER — LINEZOLID 600 MG/1
600 TABLET, FILM COATED ORAL EVERY 12 HOURS SCHEDULED
Qty: 14 TABLET | Refills: 0 | Status: SHIPPED | OUTPATIENT
Start: 2019-07-23 | End: 2019-07-30

## 2019-07-23 RX ADMIN — LINEZOLID 600 MG: 600 TABLET, FILM COATED ORAL at 07:57

## 2019-07-23 RX ADMIN — ENOXAPARIN SODIUM 40 MG: 40 INJECTION SUBCUTANEOUS at 07:57

## 2019-07-23 RX ADMIN — TORSEMIDE 20 MG: 20 TABLET ORAL at 07:57

## 2019-07-23 RX ADMIN — AMIODARONE HYDROCHLORIDE 200 MG: 200 TABLET ORAL at 07:57

## 2019-07-23 RX ADMIN — FLUCONAZOLE, SODIUM CHLORIDE 200 MG: 2 INJECTION INTRAVENOUS at 07:56

## 2019-07-23 RX ADMIN — POTASSIUM BICARBONATE 40 MEQ: 782 TABLET, EFFERVESCENT ORAL at 07:57

## 2019-07-23 RX ADMIN — Medication 10 ML: at 22:22

## 2019-07-23 RX ADMIN — LACTULOSE 20 G: 20 SOLUTION ORAL at 21:16

## 2019-07-23 RX ADMIN — POTASSIUM BICARBONATE 40 MEQ: 782 TABLET, EFFERVESCENT ORAL at 21:15

## 2019-07-23 RX ADMIN — Medication 10 ML: at 07:58

## 2019-07-23 RX ADMIN — RIFAXIMIN 550 MG: 550 TABLET ORAL at 07:57

## 2019-07-23 RX ADMIN — LINEZOLID 600 MG: 600 TABLET, FILM COATED ORAL at 21:15

## 2019-07-23 RX ADMIN — LACTULOSE 20 G: 20 SOLUTION ORAL at 07:57

## 2019-07-23 RX ADMIN — RIFAXIMIN 550 MG: 550 TABLET ORAL at 21:15

## 2019-07-23 RX ADMIN — LACTULOSE 20 G: 20 SOLUTION ORAL at 13:33

## 2019-07-23 ASSESSMENT — PAIN SCALES - GENERAL
PAINLEVEL_OUTOF10: 0

## 2019-07-23 ASSESSMENT — PAIN SCALES - WONG BAKER
WONGBAKER_NUMERICALRESPONSE: 0

## 2019-07-23 NOTE — PLAN OF CARE
Problem: Risk for Impaired Skin Integrity  Goal: Tissue integrity - skin and mucous membranes  Description  Structural intactness and normal physiological function of skin and  mucous membranes. Outcome: Ongoing  Note:   Pt currently has some scattered bruising as well as redness over the scrotum. Pt has a suprapubic catheter with christian care completed. Pt is able to turn self in bed. Will continue to monitor and assess skin. Problem: Falls - Risk of:  Goal: Will remain free from falls  Description  Will remain free from falls  Outcome: Ongoing  Note:   Pt remains free from falls this shift. Pt is in bed with the alarm on, wheels locked, bed in the lowest position, non-skid socks on, and bedside table and call light within reach. Pt uses call light appropriately. Problem: Pain:  Goal: Pain level will decrease  Description  Pain level will decrease  Outcome: Ongoing  Note:   Pt does not complain of pain this shift. Will continue to monitor.

## 2019-07-23 NOTE — PLAN OF CARE
Patient is alert and oriented, up with assist with walker, call light within reach. Fall precautions in place. AM meds complete, patient tolerated well. VSS and WDL. No s/s of distress, no further needs noted at this time.  Electronically signed by Shant Mancilla RN on 7/23/2019 at 10:20 AM

## 2019-07-23 NOTE — PROGRESS NOTES
prior to end of may 2019. Steps to enter home prior to SNF           Orientation  Orientation  Overall Orientation Status: Impaired  Orientation Level: Oriented to person;Disoriented to place; Disoriented to time;Disoriented to situation  Cognition      Objective   Bed mobility  Supine to Sit: Unable to assess  Comment: OOB throughout treatment session   Transfers  Sit to Stand: Stand by assistance  Stand to sit: Stand by assistance  Comment: VC for hand placement for safety throughout   Ambulation  Ambulation?: Yes  Ambulation 1  Surface: level tile  Device: Rolling Walker  Assistance: Stand by assistance;Contact guard assistance  Quality of Gait: slight flexed posture, occassionally pushes RW too far forward/stepping out of walker requires cues for proper management but without physical assistance   Gait Deviations: Slow Ann;Decreased step length;Decreased step height;Deviated path  Distance: 61' with multiple turns in room, 36' with multiple turns in room   Stairs/Curb  Stairs?: No     Balance  Posture: Fair  Sitting - Static: Good  Sitting - Dynamic: Good  Standing - Static: Good  Standing - Dynamic: Fair;+  Comments: RW for stability                                     AM-PAC Score  AM-PAC Inpatient Mobility Raw Score : 17 (07/23/19 1621)  AM-PAC Inpatient T-Scale Score : 42.13 (07/23/19 1621)  Mobility Inpatient CMS 0-100% Score: 50.57 (07/23/19 1621)  Mobility Inpatient CMS G-Code Modifier : CK (07/23/19 1621)          Goals  Short term goals  Time Frame for Short term goals: by discharge  Short term goal 1: bed mob   Short term goal 2: transfers sup  Short term goal 3: amb 100' with RW SBA  Patient Goals   Patient goals : pt did not state a goal    Plan    Plan  Times per week: 3-5  Current Treatment Recommendations: Functional Mobility Training  Safety Devices  Type of devices: Call light within reach, Chair alarm in place, Left in chair, Nurse notified, Gait belt, Patient at risk for falls, Telesitter in use, All fall risk precautions in place  Restraints  Initially in place: No     Therapy Time   Individual Concurrent Group Co-treatment   Time In 1553         Time Out 1621         Minutes 28         Timed Code Treatment Minutes: 1709 Ang Melo PT    This note also serves as a D/C Summary in the event that this patient is discharged prior to the next therapy session.    Electronically signed by Forest Esquivel PT DPT [id number] on 7/23/2019 at 4:22 PM

## 2019-07-24 LAB
ALBUMIN SERPL-MCNC: 2.2 G/DL (ref 3.4–5)
ANION GAP SERPL CALCULATED.3IONS-SCNC: 10 MMOL/L (ref 3–16)
BUN BLDV-MCNC: 12 MG/DL (ref 7–20)
CALCIUM SERPL-MCNC: 8.4 MG/DL (ref 8.3–10.6)
CHLORIDE BLD-SCNC: 105 MMOL/L (ref 99–110)
CO2: 26 MMOL/L (ref 21–32)
CREAT SERPL-MCNC: 1.2 MG/DL (ref 0.8–1.3)
GFR AFRICAN AMERICAN: >60
GFR NON-AFRICAN AMERICAN: >60
GLUCOSE BLD-MCNC: 88 MG/DL (ref 70–99)
PHOSPHORUS: 4 MG/DL (ref 2.5–4.9)
POTASSIUM SERPL-SCNC: 3.6 MMOL/L (ref 3.5–5.1)
SODIUM BLD-SCNC: 141 MMOL/L (ref 136–145)

## 2019-07-24 PROCEDURE — 97535 SELF CARE MNGMENT TRAINING: CPT

## 2019-07-24 PROCEDURE — 6370000000 HC RX 637 (ALT 250 FOR IP): Performed by: INTERNAL MEDICINE

## 2019-07-24 PROCEDURE — 6360000002 HC RX W HCPCS: Performed by: INTERNAL MEDICINE

## 2019-07-24 PROCEDURE — 36415 COLL VENOUS BLD VENIPUNCTURE: CPT

## 2019-07-24 PROCEDURE — 94760 N-INVAS EAR/PLS OXIMETRY 1: CPT

## 2019-07-24 PROCEDURE — 97530 THERAPEUTIC ACTIVITIES: CPT | Performed by: PHYSICAL THERAPIST

## 2019-07-24 PROCEDURE — 2580000003 HC RX 258: Performed by: INTERNAL MEDICINE

## 2019-07-24 PROCEDURE — 80069 RENAL FUNCTION PANEL: CPT

## 2019-07-24 PROCEDURE — 97530 THERAPEUTIC ACTIVITIES: CPT

## 2019-07-24 PROCEDURE — 1200000000 HC SEMI PRIVATE

## 2019-07-24 PROCEDURE — 97116 GAIT TRAINING THERAPY: CPT | Performed by: PHYSICAL THERAPIST

## 2019-07-24 RX ADMIN — TORSEMIDE 20 MG: 20 TABLET ORAL at 08:59

## 2019-07-24 RX ADMIN — POTASSIUM BICARBONATE 40 MEQ: 782 TABLET, EFFERVESCENT ORAL at 08:59

## 2019-07-24 RX ADMIN — AMIODARONE HYDROCHLORIDE 200 MG: 200 TABLET ORAL at 08:59

## 2019-07-24 RX ADMIN — LACTULOSE 20 G: 20 SOLUTION ORAL at 14:38

## 2019-07-24 RX ADMIN — RIFAXIMIN 550 MG: 550 TABLET ORAL at 20:29

## 2019-07-24 RX ADMIN — RIFAXIMIN 550 MG: 550 TABLET ORAL at 08:59

## 2019-07-24 RX ADMIN — LINEZOLID 600 MG: 600 TABLET, FILM COATED ORAL at 08:59

## 2019-07-24 RX ADMIN — POTASSIUM BICARBONATE 40 MEQ: 782 TABLET, EFFERVESCENT ORAL at 20:30

## 2019-07-24 RX ADMIN — LACTULOSE 20 G: 20 SOLUTION ORAL at 08:59

## 2019-07-24 RX ADMIN — Medication 10 ML: at 09:00

## 2019-07-24 RX ADMIN — ENOXAPARIN SODIUM 40 MG: 40 INJECTION SUBCUTANEOUS at 08:59

## 2019-07-24 RX ADMIN — LACTULOSE 20 G: 20 SOLUTION ORAL at 20:29

## 2019-07-24 RX ADMIN — LINEZOLID 600 MG: 600 TABLET, FILM COATED ORAL at 20:29

## 2019-07-24 RX ADMIN — Medication 10 ML: at 20:30

## 2019-07-24 ASSESSMENT — PAIN SCALES - PAIN ASSESSMENT IN ADVANCED DEMENTIA (PAINAD)
CONSOLABILITY: 0
BREATHING: 0
BODYLANGUAGE: 0
FACIALEXPRESSION: 0
FACIALEXPRESSION: 0
NEGVOCALIZATION: 0
TOTALSCORE: 0
BREATHING: 0
CONSOLABILITY: 0
TOTALSCORE: 0
BODYLANGUAGE: 0
NEGVOCALIZATION: 0
FACIALEXPRESSION: 0
TOTALSCORE: 0
CONSOLABILITY: 0
NEGVOCALIZATION: 0
BREATHING: 0
BODYLANGUAGE: 0

## 2019-07-24 ASSESSMENT — PAIN SCALES - GENERAL
PAINLEVEL_OUTOF10: 0

## 2019-07-24 NOTE — PROGRESS NOTES
goal 3: amb 100' with RW SBA  Patient Goals   Patient goals : pt did not state a goal    Plan    Plan  Times per week: 3-5  Current Treatment Recommendations: Functional Mobility Training  Safety Devices  Type of devices: Call light within reach, Bed alarm in place, Left in bed, Nurse notified  Restraints  Initially in place: No     Therapy Time   Individual Concurrent Group Co-treatment   Time In 6818         Time Out 1450         Minutes 45              If patient is discharged prior to the next physical therapy visit;  Please see last written PT note for discharge status.     Haleigh Romero, PT, [phone]   HALEIGH ROMERO, PT

## 2019-07-24 NOTE — PROGRESS NOTES
cirrhosis ([address] Anxiety, Arthritis, Basal cell carcinoma, Cerebral artery occlusion with cerebral infarction ([address] Circulation problem, Dementia, Depressive disorder, GERD (gastroesophageal reflux disease), Hypomagnesemia, Hypopotassemia, MDRO (multiple drug resistant organisms) resistance, SOB (shortness of breath), Spinal stenosis, Suicidal behavior, Syncope, Thrombocytopenia (HCC), Traumatic brain injury ([address] VRE (vancomycin resistant enterococcus) culture positive, Wears dentures, Wears glasses, and Wheezing. has a past surgical history that includes Upper gastrointestinal endoscopy; Colonoscopy; Upper gastrointestinal endoscopy (01/04/2018); and Colonoscopy (08/22/2018). Restrictions  Restrictions/Precautions  Restrictions/Precautions: Contact Precautions(MDRO)  Position Activity Restriction  Other position/activity restrictions: christian  Baljit   General  Chart Reviewed: Yes  Patient assessed for rehabilitation services?: Yes  Additional Pertinent Hx: Abnormality of gait, Alcohol dependence ([address] Alcoholic cirrhosis ([address] Anxiety, Arthritis, Basal cell carcinoma, Cerebral artery occlusion with cerebral infarction ([address] Circulation problem, Dementia, Depressive disorder, GERD (gastroesophageal reflux disease), Hypomagnesemia, Hypopotassemia, MDRO (multiple drug resistant organisms) resistance, SOB (shortness of breath), Spinal stenosis, Suicidal behavior, Syncope, Thrombocytopenia ([address] Traumatic brain injury ([address] Wears dentures, Wears glasses, and Wheezing. Response to previous treatment: Patient with no complaints from previous session  Family / Caregiver Present: No  Diagnosis: AMS, hepatic encephalopathy  Subjective  Subjective: Pt  met BS, lying in bed, leaning to L side. Pt agreeable to OT. Pt denied pain.   General Comment  Comments: ok to see per RN      Orientation  Orientation  Overall Orientation Status: Within Functional Limits  Orientation Level: Oriented to person;Oriented to place;Oriented to time;Oriented to situation(looks to whiteboard for information. Reported events leading to admission and taking \"Lactulose\" now)  Objective    ADL  Grooming: Setup(seated, for face, hands. Declined brushing teeth)  UE Bathing: Setup;Verbal cueing(min cues for progression)  LE Bathing: Setup;Minimal assistance; Moderate assistance(seated toreach forward and cross LLE (unable to cross RLE) to wash feet. Stance at sink for post/pro areas w/assist for throughness)  UE Dressing: Minimal assistance(gown changed)  LE Dressing: Setup; Moderate assistance;Verbal cueing(seated to cross LLE to reach to start footie; assist to LLE and to don briefs over feet.  SB/CG in stance and assist over hips)  Additional Comments: sponge bath completed at sink, sitting/standing        Standing Balance  Time: 1-2 min  Activity:    Functional Mobility  Functional - Mobility Device: Rolling Walker  Activity: To/from bathroom  Assist Level: Contact guard assistance(to SBA)  Functional Mobility Comments: room functional mob, between transfers with RW with SB/CGA cues  Wheelchair Bed Transfers  Wheelchair/Bed - Technique: Ambulating  Equipment Used: Bed(recliner; chair with arms)  Level of Asssistance: Contact guard assistance;Stand by assistance  Wheelchair Transfers Comments: RW; cues for hand placement  Bed mobility  Supine to Sit: Stand by assistance(HOB raised, use of rail)  Sit to Supine: Unable to assess(up to chair)                          Cognition  Overall Cognitive Status: Exceptions  Arousal/Alertness: Appropriate responses to stimuli  Memory: Decreased recall of recent events;Decreased short term memory  Safety Judgement: Decreased awareness of need for assistance;Decreased awareness of need for safety  Initiation: Requires cues for some  Cognition Comment: aware needing to dial 9 to use phone [facility]  Times per week: 3-5x  Times per day: Daily  Current

## 2019-07-24 NOTE — DISCHARGE SUMMARY
vitamin B-1 (THIAMINE) 100 MG tablet  Take 100 mg by mouth daily                 No future appointments. Time Spent on discharge is more than 45 minutes in the examination, evaluation, counseling and review of medications and discharge plan. Signed:  Issac Burdcik MD   7/25/2019    The note was completed using EMR. Every effort was made to ensure accuracy; however, inadvertent computerized transcription errors may be present. Thank you Volodymyr Erazo MD for the opportunity to be involved in this patient's care. If you have any questions or concerns please feel free to contact me at [phone].

## 2019-07-25 VITALS
HEART RATE: 82 BPM | SYSTOLIC BLOOD PRESSURE: 109 MMHG | DIASTOLIC BLOOD PRESSURE: 63 MMHG | BODY MASS INDEX: 32.74 KG/M2 | TEMPERATURE: 99 F | RESPIRATION RATE: 16 BRPM | WEIGHT: 203.71 LBS | HEIGHT: 66 IN | OXYGEN SATURATION: 94 %

## 2019-07-25 LAB
ALBUMIN SERPL-MCNC: 2.3 G/DL (ref 3.4–5)
ANION GAP SERPL CALCULATED.3IONS-SCNC: 7 MMOL/L (ref 3–16)
BUN BLDV-MCNC: 15 MG/DL (ref 7–20)
CALCIUM SERPL-MCNC: 8.7 MG/DL (ref 8.3–10.6)
CHLORIDE BLD-SCNC: 105 MMOL/L (ref 99–110)
CO2: 29 MMOL/L (ref 21–32)
CREAT SERPL-MCNC: 1.5 MG/DL (ref 0.8–1.3)
GFR AFRICAN AMERICAN: 56
GFR NON-AFRICAN AMERICAN: 46
GLUCOSE BLD-MCNC: 92 MG/DL (ref 70–99)
PHOSPHORUS: 4.5 MG/DL (ref 2.5–4.9)
POTASSIUM SERPL-SCNC: 3.9 MMOL/L (ref 3.5–5.1)
SODIUM BLD-SCNC: 141 MMOL/L (ref 136–145)

## 2019-07-25 PROCEDURE — 80069 RENAL FUNCTION PANEL: CPT

## 2019-07-25 PROCEDURE — 6370000000 HC RX 637 (ALT 250 FOR IP): Performed by: INTERNAL MEDICINE

## 2019-07-25 PROCEDURE — 36415 COLL VENOUS BLD VENIPUNCTURE: CPT

## 2019-07-25 PROCEDURE — 94760 N-INVAS EAR/PLS OXIMETRY 1: CPT

## 2019-07-25 PROCEDURE — 2580000003 HC RX 258: Performed by: INTERNAL MEDICINE

## 2019-07-25 PROCEDURE — 97530 THERAPEUTIC ACTIVITIES: CPT | Performed by: PHYSICAL THERAPIST

## 2019-07-25 PROCEDURE — 6360000002 HC RX W HCPCS: Performed by: INTERNAL MEDICINE

## 2019-07-25 RX ORDER — TORSEMIDE 10 MG/1
10 TABLET ORAL EVERY OTHER DAY
Qty: 30 TABLET | Refills: 0
Start: 2019-07-25 | End: 2019-09-25 | Stop reason: DRUGHIGH

## 2019-07-25 RX ADMIN — ENOXAPARIN SODIUM 40 MG: 40 INJECTION SUBCUTANEOUS at 10:03

## 2019-07-25 RX ADMIN — RIFAXIMIN 550 MG: 550 TABLET ORAL at 10:02

## 2019-07-25 RX ADMIN — Medication 10 ML: at 10:05

## 2019-07-25 RX ADMIN — LACTULOSE 20 G: 20 SOLUTION ORAL at 10:03

## 2019-07-25 RX ADMIN — AMIODARONE HYDROCHLORIDE 200 MG: 200 TABLET ORAL at 10:03

## 2019-07-25 RX ADMIN — POTASSIUM BICARBONATE 40 MEQ: 782 TABLET, EFFERVESCENT ORAL at 10:02

## 2019-07-25 RX ADMIN — LINEZOLID 600 MG: 600 TABLET, FILM COATED ORAL at 10:03

## 2019-07-25 ASSESSMENT — PAIN SCALES - GENERAL
PAINLEVEL_OUTOF10: 0
PAINLEVEL_OUTOF10: 0

## 2019-07-25 ASSESSMENT — PAIN SCALES - PAIN ASSESSMENT IN ADVANCED DEMENTIA (PAINAD)
NEGVOCALIZATION: 0
FACIALEXPRESSION: 0
CONSOLABILITY: 0
NEGVOCALIZATION: 0
TOTALSCORE: 0
TOTALSCORE: 0
BREATHING: 0
CONSOLABILITY: 0
BODYLANGUAGE: 0
FACIALEXPRESSION: 0
FACIALEXPRESSION: 0
BREATHING: 0
TOTALSCORE: 0
CONSOLABILITY: 0
NEGVOCALIZATION: 0
BODYLANGUAGE: 0
BREATHING: 0
BODYLANGUAGE: 0

## 2019-07-25 NOTE — PROGRESS NOTES
Attempted to call wife, no answering machine, called Venkatesh at Home at [facility] and gave report, emphasized using stedy with pt and applying SOFIA hose as ordered by Dr. Bella George

## 2019-07-25 NOTE — PROGRESS NOTES
Hospitalist Progress Note      PCP: Regino Mcburney, MD    Date of Admission: 7/17/2019    Chief Complaint: AMS        Subjective: Patient seen and examined. Mentation appears back to baseline again. NO complaints    Dizzy on ambulation     Medications:  Reviewed    Infusion Medications     Scheduled Medications    torsemide  20 mg Oral Daily    linezolid  600 mg Oral 2 times per day    potassium bicarb-citric acid  40 mEq Oral BID    amiodarone  200 mg Oral Daily    rifaximin  550 mg Oral BID    sodium chloride flush  10 mL Intravenous 2 times per day    enoxaparin  40 mg Subcutaneous Daily    lactulose  20 g Oral TID     PRN Meds: sodium chloride flush, magnesium hydroxide, ondansetron      Intake/Output Summary (Last 24 hours) at 7/25/2019 0815  Last data filed at 7/25/2019 0415  Gross per 24 hour   Intake 600 ml   Output 1350 ml   Net -750 ml       Physical Exam Performed:    BP (!) 92/54   Pulse 81   Temp 98 °F (36.7 °C) (Oral)   Resp 16   Ht 5' 6\" (1.676 m)   Wt 203 lb 11.3 oz (92.4 kg)   SpO2 99%   BMI 32.88 kg/m²     General appearance: No apparent distress, appears stated age and cooperative. HEENT: Pupils equal, round, and reactive to light. Conjunctivae/corneas clear. Neck: Supple, with full range of motion. No jugular venous distention. Trachea midline. Respiratory:  Normal respiratory effort. Clear to auscultation, bilaterally without Rales/Wheezes/Rhonchi. Cardiovascular: Regular rate and rhythm with normal S1/S2 without murmurs, rubs or gallops. Abdomen: Suprapubic catheter in place  Musculoskeletal: No clubbing, cyanosis or edema bilaterally. Full range of motion without deformity. Skin: Skin color, texture, turgor normal.  No rashes or lesions. Neurologic:  Neurovascularly intact without any focal sensory/motor deficits.  Cranial nerves: II-XII intact, grossly non-focal.  Psychiatric: Alert oriented x2  Capillary Refill: Brisk,< 3 seconds   Peripheral Pulses: +2 palpable, subacute hemorrhage. Neurology was consulted and they read the scan and said that there was no hemorrhage. Patient mentation improved with lactulose and rifaximin. He was also found to have enterococcus UTI and was switched to linezolid. Patient's blood pressure is running on the lower side. His Demadex dose has been decreased.   Patient needs to wear SOFIA hose while ambulating    Hepatic Encephalopathy- improved   -ct po lactulose and rifaximin      Alcoholic Liver Cirrhosis  - with hemorrhoids and esophageal varices  - GI consulted  - monitor  - restart  torsemide    UTI 2/2 Enterococcus- due to [facility]  - switch to linezolid day 4     Pancytopenia  - likely cirrhosis induced  - no signs of bleeding  - monitor     Hypokalemia  - replete  - recheck stable    Hypomagnesemia  - replete  - resolved    DVT Prophylaxis: lovenox  Diet: DIET GENERAL;  Dietary Nutrition Supplements: Other Oral Supplement (see comment)  Code Status: Full Code    PT/OT Eval Status: Ordered    Dispo - discharged    Evens Graves MD

## 2019-07-25 NOTE — CARE COORDINATION
Patient will be d/c to Home at [facility] at 11:30am by [address] ambulance. Report: [phone]    Sw left message with Gregg Olson at Home at [facility] to inform of time. IMM reviewed with patient's wife.      Surya Arteaga, [address] [place] [address]  [phone]  7/25/19 at 10:50 AM

## 2019-07-25 NOTE — PROGRESS NOTES
Occupational Therapy    Attempted treatment but nursing reports pt was just lowered to the floor and they had concerns for orthostatic BP. Will follow for tx as appropriate.      Geovanni Acuna OTR/L

## 2019-08-01 ENCOUNTER — APPOINTMENT (OUTPATIENT)
Dept: GENERAL RADIOLOGY | Age: 69
DRG: 388 | End: 2019-08-01
Payer: MEDICARE

## 2019-08-01 ENCOUNTER — HOSPITAL ENCOUNTER (INPATIENT)
Age: 69
LOS: 18 days | Discharge: NURSING, REHAB OR LONG-TERM CARE | DRG: 388 | End: 2019-08-19
Attending: EMERGENCY MEDICINE | Admitting: HOSPITALIST
Payer: MEDICARE

## 2019-08-01 ENCOUNTER — APPOINTMENT (OUTPATIENT)
Dept: CT IMAGING | Age: 69
DRG: 388 | End: 2019-08-01
Payer: MEDICARE

## 2019-08-01 DIAGNOSIS — R41.82 ALTERED MENTAL STATUS, UNSPECIFIED ALTERED MENTAL STATUS TYPE: ICD-10-CM

## 2019-08-01 DIAGNOSIS — E87.6 HYPOKALEMIA: ICD-10-CM

## 2019-08-01 DIAGNOSIS — K70.30 ALCOHOLIC CIRRHOSIS OF LIVER WITHOUT ASCITES (HCC): ICD-10-CM

## 2019-08-01 DIAGNOSIS — K56.2 CECAL VOLVULUS (HCC): Primary | ICD-10-CM

## 2019-08-01 PROBLEM — N17.9 AKI (ACUTE KIDNEY INJURY) (HCC): Status: ACTIVE | Noted: 2019-08-01

## 2019-08-01 PROBLEM — R10.9 ABDOMINAL PAIN: Status: ACTIVE | Noted: 2019-08-01

## 2019-08-01 LAB
AMMONIA: 45 UMOL/L (ref 16–60)
ANION GAP SERPL CALCULATED.3IONS-SCNC: 13 MMOL/L (ref 3–16)
BACTERIA: ABNORMAL /HPF
BASE EXCESS VENOUS: -3.3 MMOL/L
BASOPHILS ABSOLUTE: 0 K/UL (ref 0–0.2)
BASOPHILS RELATIVE PERCENT: 0.7 %
BILIRUBIN URINE: NEGATIVE
BLOOD, URINE: ABNORMAL
BUN BLDV-MCNC: 27 MG/DL (ref 7–20)
CALCIUM SERPL-MCNC: 9.7 MG/DL (ref 8.3–10.6)
CARBOXYHEMOGLOBIN: 2 %
CASTS: ABNORMAL /LPF
CHLORIDE BLD-SCNC: 108 MMOL/L (ref 99–110)
CLARITY: ABNORMAL
CO2: 22 MMOL/L (ref 21–32)
COLOR: YELLOW
CREAT SERPL-MCNC: 1.6 MG/DL (ref 0.8–1.3)
EOSINOPHILS ABSOLUTE: 0.1 K/UL (ref 0–0.6)
EOSINOPHILS RELATIVE PERCENT: 2.6 %
GFR AFRICAN AMERICAN: 52
GFR NON-AFRICAN AMERICAN: 43
GLUCOSE BLD-MCNC: 105 MG/DL (ref 70–99)
GLUCOSE BLD-MCNC: 121 MG/DL (ref 70–99)
GLUCOSE URINE: NEGATIVE MG/DL
HCO3 VENOUS: 22 MMOL/L (ref 23–29)
HCT VFR BLD CALC: 26.3 % (ref 40.5–52.5)
HEMOGLOBIN: 8.8 G/DL (ref 13.5–17.5)
KETONES, URINE: NEGATIVE MG/DL
LEUKOCYTE ESTERASE, URINE: ABNORMAL
LYMPHOCYTES ABSOLUTE: 1.6 K/UL (ref 1–5.1)
LYMPHOCYTES RELATIVE PERCENT: 33.7 %
MAGNESIUM: 2.2 MG/DL (ref 1.8–2.4)
MCH RBC QN AUTO: 34.7 PG (ref 26–34)
MCHC RBC AUTO-ENTMCNC: 33.3 G/DL (ref 31–36)
MCV RBC AUTO: 104 FL (ref 80–100)
METHEMOGLOBIN VENOUS: 1 %
MICROSCOPIC EXAMINATION: YES
MONOCYTES ABSOLUTE: 0.2 K/UL (ref 0–1.3)
MONOCYTES RELATIVE PERCENT: 5.3 %
NEUTROPHILS ABSOLUTE: 2.7 K/UL (ref 1.7–7.7)
NEUTROPHILS RELATIVE PERCENT: 57.7 %
NITRITE, URINE: NEGATIVE
O2 CONTENT, VEN: 9 ML/DL
O2 SAT, VEN: 82 %
O2 THERAPY: ABNORMAL
PCO2, VEN: 44.4 MMHG (ref 40–50)
PDW BLD-RTO: 16.9 % (ref 12.4–15.4)
PERFORMED ON: ABNORMAL
PH UA: 6 (ref 5–8)
PH VENOUS: 7.32 (ref 7.35–7.45)
PLATELET # BLD: 67 K/UL (ref 135–450)
PMV BLD AUTO: 9.7 FL (ref 5–10.5)
PO2, VEN: 53 MMHG
POTASSIUM REFLEX MAGNESIUM: 2.7 MMOL/L (ref 3.5–5.1)
PROTEIN UA: 100 MG/DL
RBC # BLD: 2.53 M/UL (ref 4.2–5.9)
RBC UA: ABNORMAL /HPF (ref 0–2)
SODIUM BLD-SCNC: 143 MMOL/L (ref 136–145)
SPECIFIC GRAVITY UA: 1.01 (ref 1–1.03)
TCO2 CALC VENOUS: 23 MMOL/L
TROPONIN: <0.01 NG/ML
URINE REFLEX TO CULTURE: YES
URINE TYPE: ABNORMAL
UROBILINOGEN, URINE: 0.2 E.U./DL
WBC # BLD: 4.7 K/UL (ref 4–11)
WBC UA: ABNORMAL /HPF (ref 0–5)
YEAST: PRESENT /HPF

## 2019-08-01 PROCEDURE — 87186 SC STD MICRODIL/AGAR DIL: CPT

## 2019-08-01 PROCEDURE — 84484 ASSAY OF TROPONIN QUANT: CPT

## 2019-08-01 PROCEDURE — 1200000000 HC SEMI PRIVATE

## 2019-08-01 PROCEDURE — 6360000002 HC RX W HCPCS: Performed by: EMERGENCY MEDICINE

## 2019-08-01 PROCEDURE — 85025 COMPLETE CBC W/AUTO DIFF WBC: CPT

## 2019-08-01 PROCEDURE — 82140 ASSAY OF AMMONIA: CPT

## 2019-08-01 PROCEDURE — 71045 X-RAY EXAM CHEST 1 VIEW: CPT

## 2019-08-01 PROCEDURE — 83735 ASSAY OF MAGNESIUM: CPT

## 2019-08-01 PROCEDURE — 87086 URINE CULTURE/COLONY COUNT: CPT

## 2019-08-01 PROCEDURE — 99285 EMERGENCY DEPT VISIT HI MDM: CPT

## 2019-08-01 PROCEDURE — 81001 URINALYSIS AUTO W/SCOPE: CPT

## 2019-08-01 PROCEDURE — 96365 THER/PROPH/DIAG IV INF INIT: CPT

## 2019-08-01 PROCEDURE — 74176 CT ABD & PELVIS W/O CONTRAST: CPT

## 2019-08-01 PROCEDURE — 93005 ELECTROCARDIOGRAM TRACING: CPT | Performed by: EMERGENCY MEDICINE

## 2019-08-01 PROCEDURE — 87077 CULTURE AEROBIC IDENTIFY: CPT

## 2019-08-01 PROCEDURE — 80048 BASIC METABOLIC PNL TOTAL CA: CPT

## 2019-08-01 PROCEDURE — 99222 1ST HOSP IP/OBS MODERATE 55: CPT | Performed by: SURGERY

## 2019-08-01 PROCEDURE — 82803 BLOOD GASES ANY COMBINATION: CPT

## 2019-08-01 PROCEDURE — 87184 SC STD DISK METHOD PER PLATE: CPT

## 2019-08-01 RX ORDER — LINEZOLID 2 MG/ML
600 INJECTION, SOLUTION INTRAVENOUS EVERY 12 HOURS
Status: DISCONTINUED | OUTPATIENT
Start: 2019-08-02 | End: 2019-08-02

## 2019-08-01 RX ORDER — POTASSIUM CHLORIDE 20 MEQ/1
40 TABLET, EXTENDED RELEASE ORAL PRN
Status: DISCONTINUED | OUTPATIENT
Start: 2019-08-01 | End: 2019-08-17

## 2019-08-01 RX ORDER — ONDANSETRON 2 MG/ML
4 INJECTION INTRAMUSCULAR; INTRAVENOUS EVERY 6 HOURS PRN
Status: DISCONTINUED | OUTPATIENT
Start: 2019-08-01 | End: 2019-08-19 | Stop reason: HOSPADM

## 2019-08-01 RX ORDER — SODIUM CHLORIDE 0.9 % (FLUSH) 0.9 %
10 SYRINGE (ML) INJECTION EVERY 12 HOURS SCHEDULED
Status: DISCONTINUED | OUTPATIENT
Start: 2019-08-02 | End: 2019-08-19 | Stop reason: HOSPADM

## 2019-08-01 RX ORDER — SODIUM CHLORIDE, SODIUM LACTATE, POTASSIUM CHLORIDE, CALCIUM CHLORIDE 600; 310; 30; 20 MG/100ML; MG/100ML; MG/100ML; MG/100ML
INJECTION, SOLUTION INTRAVENOUS CONTINUOUS
Status: DISCONTINUED | OUTPATIENT
Start: 2019-08-02 | End: 2019-08-10

## 2019-08-01 RX ORDER — POTASSIUM CHLORIDE 7.45 MG/ML
10 INJECTION INTRAVENOUS PRN
Status: DISCONTINUED | OUTPATIENT
Start: 2019-08-01 | End: 2019-08-17

## 2019-08-01 RX ORDER — SODIUM CHLORIDE 0.9 % (FLUSH) 0.9 %
10 SYRINGE (ML) INJECTION PRN
Status: DISCONTINUED | OUTPATIENT
Start: 2019-08-01 | End: 2019-08-19 | Stop reason: HOSPADM

## 2019-08-01 RX ORDER — FLUCONAZOLE 2 MG/ML
200 INJECTION, SOLUTION INTRAVENOUS EVERY 24 HOURS
Status: DISCONTINUED | OUTPATIENT
Start: 2019-08-02 | End: 2019-08-02

## 2019-08-01 RX ORDER — POTASSIUM CHLORIDE 7.45 MG/ML
10 INJECTION INTRAVENOUS ONCE
Status: COMPLETED | OUTPATIENT
Start: 2019-08-01 | End: 2019-08-01

## 2019-08-01 RX ADMIN — POTASSIUM CHLORIDE 10 MEQ: 7.46 INJECTION, SOLUTION INTRAVENOUS at 21:10

## 2019-08-01 ASSESSMENT — PAIN SCALES - PAIN ASSESSMENT IN ADVANCED DEMENTIA (PAINAD)
BREATHING: 0
BREATHING: 1
BODYLANGUAGE: 0
FACIALEXPRESSION: 1
TOTALSCORE: 0
BODYLANGUAGE: 0
FACIALEXPRESSION: 0
TOTALSCORE: 4
NEGVOCALIZATION: 0
NEGVOCALIZATION: 1
CONSOLABILITY: 0
CONSOLABILITY: 1

## 2019-08-01 ASSESSMENT — PAIN SCALES - GENERAL
PAINLEVEL_OUTOF10: 0
PAINLEVEL_OUTOF10: 0

## 2019-08-01 NOTE — ED PROVIDER NOTES
mouth daily       ALLERGIES     Patient has no known allergies. FAMILY HISTORY       Family History   Problem Relation Age of Onset    Emphysema Father         SOCIAL HISTORY       Social History     Socioeconomic History    Marital status:      Spouse name: None    Number of children: None    Years of education: None    Highest education level: None   Occupational History    None   Social Needs    Financial resource strain: None    Food insecurity:     Worry: None     Inability: None    Transportation needs:     Medical: None     Non-medical: None   Tobacco Use    Smoking status: Never Smoker    Smokeless tobacco: Never Used   Substance and Sexual Activity    Alcohol use: Yes     Alcohol/week: 1.0 standard drinks     Types: 1 Cans of beer per week     Comment: history of drinking a bottle of whiskey a day 1can a day    Drug use: No    Sexual activity: None   Lifestyle    Physical activity:     Days per week: None     Minutes per session: None    Stress: None   Relationships    Social connections:     Talks on phone: None     Gets together: None     Attends Restorationist service: None     Active member of club or organization: None     Attends meetings of clubs or organizations: None     Relationship status: None    Intimate partner violence:     Fear of current or ex partner: None     Emotionally abused: None     Physically abused: None     Forced sexual activity: None   Other Topics Concern    None   Social History Narrative    None       SCREENINGS           PHYSICAL EXAM    (up to 7 for level 4, 8 or more for level 5)     ED Triage Vitals [08/01/19 1833]   BP Temp Temp Source Pulse Resp SpO2 Height Weight   120/69 97.7 °F (36.5 °C) Oral 56 16 95 % -- --       Physical Exam    General: Chronically confused altered mental status. Nontoxic appearance. Well-developed well-nourished obese male with abdominal pain from a nursing home.   Markedly distended abdomen  HEENT: Normocephalic

## 2019-08-02 ENCOUNTER — ANESTHESIA (OUTPATIENT)
Dept: ENDOSCOPY | Age: 69
DRG: 388 | End: 2019-08-02
Payer: MEDICARE

## 2019-08-02 ENCOUNTER — ANESTHESIA EVENT (OUTPATIENT)
Dept: ENDOSCOPY | Age: 69
DRG: 388 | End: 2019-08-02
Payer: MEDICARE

## 2019-08-02 VITALS
RESPIRATION RATE: 15 BRPM | SYSTOLIC BLOOD PRESSURE: 99 MMHG | OXYGEN SATURATION: 100 % | DIASTOLIC BLOOD PRESSURE: 53 MMHG

## 2019-08-02 PROBLEM — G93.41 ACUTE METABOLIC ENCEPHALOPATHY: Status: ACTIVE | Noted: 2019-05-24

## 2019-08-02 PROBLEM — R79.89 ELEVATED LACTIC ACID LEVEL: Status: ACTIVE | Noted: 2019-08-02

## 2019-08-02 LAB
A/G RATIO: 0.7 (ref 1.1–2.2)
ALBUMIN SERPL-MCNC: 2.4 G/DL (ref 3.4–5)
ALP BLD-CCNC: 85 U/L (ref 40–129)
ALT SERPL-CCNC: 17 U/L (ref 10–40)
ANION GAP SERPL CALCULATED.3IONS-SCNC: 14 MMOL/L (ref 3–16)
AST SERPL-CCNC: 39 U/L (ref 15–37)
BASOPHILS ABSOLUTE: 0 K/UL (ref 0–0.2)
BASOPHILS RELATIVE PERCENT: 0.7 %
BILIRUB SERPL-MCNC: 1.4 MG/DL (ref 0–1)
BUN BLDV-MCNC: 22 MG/DL (ref 7–20)
C DIFF TOXIN/ANTIGEN: NORMAL
CALCIUM SERPL-MCNC: 8.9 MG/DL (ref 8.3–10.6)
CHLORIDE BLD-SCNC: 109 MMOL/L (ref 99–110)
CO2: 19 MMOL/L (ref 21–32)
CREAT SERPL-MCNC: 1.4 MG/DL (ref 0.8–1.3)
EKG ATRIAL RATE: 58 BPM
EKG DIAGNOSIS: NORMAL
EKG Q-T INTERVAL: 576 MS
EKG QRS DURATION: 104 MS
EKG QTC CALCULATION (BAZETT): 565 MS
EKG R AXIS: 51 DEGREES
EKG T AXIS: 60 DEGREES
EKG VENTRICULAR RATE: 58 BPM
EOSINOPHILS ABSOLUTE: 0.1 K/UL (ref 0–0.6)
EOSINOPHILS RELATIVE PERCENT: 2.4 %
GFR AFRICAN AMERICAN: >60
GFR NON-AFRICAN AMERICAN: 50
GLOBULIN: 3.3 G/DL
GLUCOSE BLD-MCNC: 108 MG/DL (ref 70–99)
HCT VFR BLD CALC: 24.8 % (ref 40.5–52.5)
HEMOGLOBIN: 8.3 G/DL (ref 13.5–17.5)
LACTIC ACID: 2.4 MMOL/L (ref 0.4–2)
LACTIC ACID: 3 MMOL/L (ref 0.4–2)
LACTIC ACID: 3.4 MMOL/L (ref 0.4–2)
LYMPHOCYTES ABSOLUTE: 1.3 K/UL (ref 1–5.1)
LYMPHOCYTES RELATIVE PERCENT: 28 %
MAGNESIUM: 1.9 MG/DL (ref 1.8–2.4)
MCH RBC QN AUTO: 34.8 PG (ref 26–34)
MCHC RBC AUTO-ENTMCNC: 33.4 G/DL (ref 31–36)
MCV RBC AUTO: 104.3 FL (ref 80–100)
MONOCYTES ABSOLUTE: 0.2 K/UL (ref 0–1.3)
MONOCYTES RELATIVE PERCENT: 3.8 %
NEUTROPHILS ABSOLUTE: 3 K/UL (ref 1.7–7.7)
NEUTROPHILS RELATIVE PERCENT: 65.1 %
PDW BLD-RTO: 16.5 % (ref 12.4–15.4)
PLATELET # BLD: 49 K/UL (ref 135–450)
PMV BLD AUTO: 9.9 FL (ref 5–10.5)
POTASSIUM REFLEX MAGNESIUM: 2.6 MMOL/L (ref 3.5–5.1)
POTASSIUM SERPL-SCNC: 2.5 MMOL/L (ref 3.5–5.1)
POTASSIUM SERPL-SCNC: 2.8 MMOL/L (ref 3.5–5.1)
PROCALCITONIN: 0.09 NG/ML (ref 0–0.15)
RBC # BLD: 2.38 M/UL (ref 4.2–5.9)
SODIUM BLD-SCNC: 142 MMOL/L (ref 136–145)
TOTAL PROTEIN: 5.7 G/DL (ref 6.4–8.2)
WBC # BLD: 4.7 K/UL (ref 4–11)

## 2019-08-02 PROCEDURE — 2709999900 HC NON-CHARGEABLE SUPPLY: Performed by: INTERNAL MEDICINE

## 2019-08-02 PROCEDURE — 87449 NOS EACH ORGANISM AG IA: CPT

## 2019-08-02 PROCEDURE — 84145 PROCALCITONIN (PCT): CPT

## 2019-08-02 PROCEDURE — 2580000003 HC RX 258: Performed by: NURSE PRACTITIONER

## 2019-08-02 PROCEDURE — 84132 ASSAY OF SERUM POTASSIUM: CPT

## 2019-08-02 PROCEDURE — 99223 1ST HOSP IP/OBS HIGH 75: CPT | Performed by: INTERNAL MEDICINE

## 2019-08-02 PROCEDURE — 6360000002 HC RX W HCPCS: Performed by: INTERNAL MEDICINE

## 2019-08-02 PROCEDURE — 36415 COLL VENOUS BLD VENIPUNCTURE: CPT

## 2019-08-02 PROCEDURE — 3609009500 HC COLONOSCOPY DIAGNOSTIC OR SCREENING: Performed by: INTERNAL MEDICINE

## 2019-08-02 PROCEDURE — 87324 CLOSTRIDIUM AG IA: CPT

## 2019-08-02 PROCEDURE — 2500000003 HC RX 250 WO HCPCS: Performed by: NURSE ANESTHETIST, CERTIFIED REGISTERED

## 2019-08-02 PROCEDURE — 87040 BLOOD CULTURE FOR BACTERIA: CPT

## 2019-08-02 PROCEDURE — 94760 N-INVAS EAR/PLS OXIMETRY 1: CPT

## 2019-08-02 PROCEDURE — 7100000000 HC PACU RECOVERY - FIRST 15 MIN: Performed by: INTERNAL MEDICINE

## 2019-08-02 PROCEDURE — 7100000001 HC PACU RECOVERY - ADDTL 15 MIN: Performed by: INTERNAL MEDICINE

## 2019-08-02 PROCEDURE — 85025 COMPLETE CBC W/AUTO DIFF WBC: CPT

## 2019-08-02 PROCEDURE — 6360000002 HC RX W HCPCS: Performed by: NURSE ANESTHETIST, CERTIFIED REGISTERED

## 2019-08-02 PROCEDURE — 6360000002 HC RX W HCPCS: Performed by: NURSE PRACTITIONER

## 2019-08-02 PROCEDURE — 2580000003 HC RX 258: Performed by: INTERNAL MEDICINE

## 2019-08-02 PROCEDURE — 80053 COMPREHEN METABOLIC PANEL: CPT

## 2019-08-02 PROCEDURE — 83605 ASSAY OF LACTIC ACID: CPT

## 2019-08-02 PROCEDURE — 99232 SBSQ HOSP IP/OBS MODERATE 35: CPT | Performed by: SURGERY

## 2019-08-02 PROCEDURE — 3700000001 HC ADD 15 MINUTES (ANESTHESIA): Performed by: INTERNAL MEDICINE

## 2019-08-02 PROCEDURE — 3700000000 HC ANESTHESIA ATTENDED CARE: Performed by: INTERNAL MEDICINE

## 2019-08-02 PROCEDURE — 1200000000 HC SEMI PRIVATE

## 2019-08-02 PROCEDURE — 93010 ELECTROCARDIOGRAM REPORT: CPT | Performed by: INTERNAL MEDICINE

## 2019-08-02 PROCEDURE — 83735 ASSAY OF MAGNESIUM: CPT

## 2019-08-02 PROCEDURE — 2580000003 HC RX 258: Performed by: NURSE ANESTHETIST, CERTIFIED REGISTERED

## 2019-08-02 PROCEDURE — 0DJD8ZZ INSPECTION OF LOWER INTESTINAL TRACT, VIA NATURAL OR ARTIFICIAL OPENING ENDOSCOPIC: ICD-10-PCS | Performed by: INTERNAL MEDICINE

## 2019-08-02 RX ORDER — 0.9 % SODIUM CHLORIDE 0.9 %
1500 INTRAVENOUS SOLUTION INTRAVENOUS ONCE
Status: COMPLETED | OUTPATIENT
Start: 2019-08-02 | End: 2019-08-02

## 2019-08-02 RX ORDER — MIDODRINE HYDROCHLORIDE 5 MG/1
5 TABLET ORAL 2 TIMES DAILY
Status: ON HOLD | COMMUNITY
End: 2019-08-19 | Stop reason: HOSPADM

## 2019-08-02 RX ORDER — POTASSIUM CHLORIDE 750 MG/1
10 CAPSULE, EXTENDED RELEASE ORAL DAILY
Status: ON HOLD | COMMUNITY
End: 2019-08-19 | Stop reason: HOSPADM

## 2019-08-02 RX ORDER — LIDOCAINE HYDROCHLORIDE 20 MG/ML
INJECTION, SOLUTION INFILTRATION; PERINEURAL PRN
Status: DISCONTINUED | OUTPATIENT
Start: 2019-08-02 | End: 2019-08-02 | Stop reason: SDUPTHER

## 2019-08-02 RX ORDER — PROPOFOL 10 MG/ML
INJECTION, EMULSION INTRAVENOUS CONTINUOUS PRN
Status: DISCONTINUED | OUTPATIENT
Start: 2019-08-02 | End: 2019-08-02 | Stop reason: SDUPTHER

## 2019-08-02 RX ORDER — ACETAMINOPHEN 325 MG/1
650 TABLET ORAL EVERY 4 HOURS PRN
COMMUNITY

## 2019-08-02 RX ADMIN — PIPERACILLIN SODIUM,TAZOBACTAM SODIUM 3.38 G: 3; .375 INJECTION, POWDER, FOR SOLUTION INTRAVENOUS at 14:51

## 2019-08-02 RX ADMIN — PHENYLEPHRINE HYDROCHLORIDE 100 MCG: 10 INJECTION INTRAVENOUS at 12:43

## 2019-08-02 RX ADMIN — SODIUM CHLORIDE 1500 ML: 9 INJECTION, SOLUTION INTRAVENOUS at 09:33

## 2019-08-02 RX ADMIN — Medication 10 MEQ: at 10:18

## 2019-08-02 RX ADMIN — Medication 10 MEQ: at 22:29

## 2019-08-02 RX ADMIN — Medication 10 MEQ: at 23:26

## 2019-08-02 RX ADMIN — Medication 10 MEQ: at 17:09

## 2019-08-02 RX ADMIN — Medication 10 MEQ: at 04:28

## 2019-08-02 RX ADMIN — FLUCONAZOLE 200 MG: 200 INJECTION, SOLUTION INTRAVENOUS at 01:47

## 2019-08-02 RX ADMIN — SODIUM CHLORIDE, PRESERVATIVE FREE 10 ML: 5 INJECTION INTRAVENOUS at 08:50

## 2019-08-02 RX ADMIN — PROPOFOL 120 MCG/KG/MIN: 10 INJECTION, EMULSION INTRAVENOUS at 12:36

## 2019-08-02 RX ADMIN — PIPERACILLIN SODIUM,TAZOBACTAM SODIUM 3.38 G: 3; .375 INJECTION, POWDER, FOR SOLUTION INTRAVENOUS at 23:23

## 2019-08-02 RX ADMIN — Medication 10 MEQ: at 20:21

## 2019-08-02 RX ADMIN — PHENYLEPHRINE HYDROCHLORIDE 100 MCG: 10 INJECTION INTRAVENOUS at 12:50

## 2019-08-02 RX ADMIN — Medication 10 MEQ: at 05:42

## 2019-08-02 RX ADMIN — SODIUM CHLORIDE, POTASSIUM CHLORIDE, SODIUM LACTATE AND CALCIUM CHLORIDE: 600; 310; 30; 20 INJECTION, SOLUTION INTRAVENOUS at 12:32

## 2019-08-02 RX ADMIN — Medication 10 MEQ: at 21:16

## 2019-08-02 RX ADMIN — Medication 10 MEQ: at 03:28

## 2019-08-02 RX ADMIN — Medication 10 MEQ: at 18:14

## 2019-08-02 RX ADMIN — LIDOCAINE HYDROCHLORIDE 50 MG: 20 INJECTION, SOLUTION INFILTRATION; PERINEURAL at 12:36

## 2019-08-02 RX ADMIN — SODIUM CHLORIDE, POTASSIUM CHLORIDE, SODIUM LACTATE AND CALCIUM CHLORIDE: 600; 310; 30; 20 INJECTION, SOLUTION INTRAVENOUS at 00:15

## 2019-08-02 RX ADMIN — ENOXAPARIN SODIUM 40 MG: 40 INJECTION SUBCUTANEOUS at 08:49

## 2019-08-02 RX ADMIN — LINEZOLID 600 MG: 600 INJECTION, SOLUTION INTRAVENOUS at 00:40

## 2019-08-02 RX ADMIN — Medication 10 MEQ: at 08:52

## 2019-08-02 RX ADMIN — Medication 10 MEQ: at 07:35

## 2019-08-02 ASSESSMENT — ENCOUNTER SYMPTOMS: SHORTNESS OF BREATH: 1

## 2019-08-02 ASSESSMENT — PULMONARY FUNCTION TESTS
PIF_VALUE: 0

## 2019-08-02 ASSESSMENT — PAIN SCALES - GENERAL
PAINLEVEL_OUTOF10: 0

## 2019-08-02 ASSESSMENT — LIFESTYLE VARIABLES: SMOKING_STATUS: 0

## 2019-08-02 ASSESSMENT — PAIN SCALES - WONG BAKER: WONGBAKER_NUMERICALRESPONSE: 0

## 2019-08-02 NOTE — H&P
GASTROINTESTINAL ENDOSCOPY  01/04/2018       Medications Prior to Admission:      Prior to Admission medications    Medication Sig Start Date End Date Taking? Authorizing Provider   torsemide (DEMADEX) 10 MG tablet Take 1 tablet by mouth every other day 7/25/19   Diana Sofia MD   lactulose (CHRONULAC) 10 GM/15ML solution Take 30 mLs by mouth 3 times daily 7/23/19 8/22/19  Ange Sofia MD   rifaximin (XIFAXAN) 550 MG tablet Take 1 tablet by mouth 2 times daily 7/6/19   Eleuterio Alfredo MD   omeprazole (PRILOSEC) 20 MG delayed release capsule Take 20 mg by mouth nightly    Historical Provider, MD   amiodarone (CORDARONE) 200 MG tablet Take 200 mg by mouth daily    Historical Provider, MD   DULoxetine (CYMBALTA) 30 MG extended release capsule Take 30 mg by mouth daily    Historical Provider, MD   tamsulosin (FLOMAX) 0.4 MG capsule Take 0.4 mg by mouth nightly    Historical Provider, MD   miconazole (MICOTIN) 2 % powder Apply topically 2 times daily Apply topically 2 times daily to scrotum    Historical Provider, MD   mupirocin (BACTROBAN) 2 % cream Apply topically 3 times daily as needed (redness to suprapubic catheter insertion area) Apply topically 3 times daily. Historical Provider, MD   vitamin B-1 (THIAMINE) 100 MG tablet Take 100 mg by mouth daily    Historical Provider, MD   magnesium chloride (MAG DELAY) 535 (64 Mg) MG TBCR extended release tablet Take 64 mg by mouth daily  10/31/17   Historical Provider, MD   folic acid (FOLVITE) 838 MCG tablet 1 tablet by mouth daily 3/7/16   Historical Provider, MD   nadolol (CORGARD) 20 MG tablet Take 20 mg by mouth daily  11/19/15   Historical Provider, MD       Allergies:  Patient has no known allergies. Social History:      The patient currently lives at a nursing facility. TOBACCO:   reports that he has never smoked.  He has never used smokeless tobacco.  ETOH:   reports that he drinks about 1.0 standard drinks of alcohol per week.      Family History:      Reviewed in detail positive as follows:        Problem Relation Age of Onset    Emphysema Father        REVIEW OF SYSTEMS:   Pertinent positives as noted in the HPI. All other systems reviewed and negative. PHYSICAL EXAM PERFORMED:    BP (!) 143/80   Pulse 70   Temp 98.1 °F (36.7 °C) (Oral)   Resp 16   Ht 5' 6\" (1.676 m)   Wt 211 lb 3.2 oz (95.8 kg)   SpO2 99%   BMI 34.09 kg/m²     General appearance:  Lethargic and unable to be fully alert. No apparent distress, appears stated age. HEENT:  Normal cephalic, atraumatic without obvious deformity. Pupils equal, round, and reactive to light. Scleral icterus noted. Neck: Supple, with full range of motion. No jugular venous distention. Trachea midline. Respiratory:  Normal respiratory effort. Clear to auscultation, bilaterally without Rales/Wheezes/Rhonchi. Cardiovascular:  Regular rate and rhythm without murmurs, rubs or gallops. Abdomen: Markedly distended with LLQ tenderness, no rebound or guarding. High-pitched bowel sounds. Musculoskeletal:  No clubbing, cyanosis or edema bilaterally. Full range of motion without deformity. Skin: Skin color, texture, turgor normal.  No rashes or lesions. Neurologic:  Nonfocal exam. Unable to obtain full exam due to mentation. Psychiatric:  Alert and oriented, thought content appropriate, normal insight  Capillary Refill: Brisk,< 3 seconds   Peripheral Pulses: +2 palpable, equal bilaterally       Labs:     Recent Labs     08/01/19 1925   WBC 4.7   HGB 8.8*   HCT 26.3*   PLT 67*     Recent Labs     08/01/19 1925      K 2.7*      CO2 22   BUN 27*   CREATININE 1.6*   CALCIUM 9.7     No results for input(s): AST, ALT, BILIDIR, BILITOT, ALKPHOS in the last 72 hours. No results for input(s): INR in the last 72 hours.   Recent Labs     08/01/19 1925   TROPONINI <0.01       Urinalysis:      Lab Results   Component Value Date    NITRU Negative 08/01/2019    WBCUA 6-10 08/01/2019    BACTERIA Rare 08/01/2019    RBCUA 20-50 08/01/2019    BLOODU LARGE 08/01/2019    SPECGRAV 1.015 08/01/2019    GLUCOSEU Negative 08/01/2019    GLUCOSEU NEGATIVE 06/27/2011       Radiology:     CXR: I have reviewed the CXR with the following interpretation:   EKG:  I have reviewed the EKG with the following interpretation:     CT ABDOMEN PELVIS WO CONTRAST Additional Contrast? None   Final Result   Markedly abnormal dilated cecum. The cecum appears to be folded anteriorly. There is surrounding injection of the mesenteric fat. Appearance is   suggestive of early cecal volvulus-cecal bascule. Reactive fluid is seen in   the mesentery and pelvis      Cholelithiasis         XR CHEST PORTABLE   Final Result   Stable portable study. ASSESSMENT:    Active Hospital Problems    Diagnosis Date Noted    Abdominal pain [R10.9] 08/01/2019    JAIME (acute kidney injury) ([address] [N17.9] 08/01/2019    Hypokalemia [E87.6] 05/24/2019    Urinary tract infection associated with cystostomy catheter ([address] [T83.510A, N39.0] 23/87/9214    Alcoholic cirrhosis ([address] [K24.79]     Dementia [F03.90]          PLAN:  Abdominal pain  -CT abdomen and pelvis without contrast showed a markedly abnormal dilated cecum appearing to be folded anteriorly.   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  -Likely secondary to above imaging findings  -GI and general surgery consulted  -NPO    JAIME  - crt baseline ranges from 0.8-1.3 today 1.6  - prerenal, due to dehydration  - IVF  - Avoid nephrotoxins  - Renally dose medications    Hypokalemia  - was hypokalemic on admission 7/17/2019  - replete  - recheck levels    Urinary tract infection associated with cystostomy catheter   -Was being treated with linezolid for UTI before discharge on 7/25  - Start back on abx therapy with Linezolid  - Fluconazole for yeast infection  - Previous cultures result reviewed which showed enterococcus faecium VRE 7/20/19  - Urine

## 2019-08-02 NOTE — PROGRESS NOTES
4 Eyes Skin Assessment     The patient is being assess for  Admission    I agree that 2 RN's have performed a thorough Head to Toe Skin Assessment on the patient. ALL assessment sites listed below have been assessed. Areas assessed by both nurses:   [x]   Head, Face, and Ears   [x]   Shoulders, Back, and Chest  [x]   Arms, Elbows, and Hands   [x]   Coccyx, Sacrum, and IschIum  [x]   Legs, Feet, and Heels        Does the Patient have Skin Breakdown?   No         Julio Prevention initiated:  Yes   Wound Care Orders initiated:  NA      [facility] nurse consulted for Pressure Injury (Stage 3,4, Unstageable, DTI, NWPT, and Complex wounds), New and Established Ostomies:  NA      Nurse 1 eSignature: Electronically signed by Daniel Camara RN on 8/2/19 at 12:23 AM    **SHARE this note so that the co-signing nurse is able to place an eSignature**    Nurse 2 eSignature: Electronically signed by Aiden Mcdonough RN on 8/2/19 at 12:26 AM
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
## 2019-08-03 LAB
A/G RATIO: 0.8 (ref 1.1–2.2)
ALBUMIN SERPL-MCNC: 2.6 G/DL (ref 3.4–5)
ALP BLD-CCNC: 72 U/L (ref 40–129)
ALT SERPL-CCNC: 18 U/L (ref 10–40)
ANION GAP SERPL CALCULATED.3IONS-SCNC: 13 MMOL/L (ref 3–16)
AST SERPL-CCNC: 40 U/L (ref 15–37)
BASOPHILS ABSOLUTE: 0 K/UL (ref 0–0.2)
BASOPHILS RELATIVE PERCENT: 0.8 %
BILIRUB SERPL-MCNC: 1.8 MG/DL (ref 0–1)
BUN BLDV-MCNC: 16 MG/DL (ref 7–20)
CALCIUM SERPL-MCNC: 8.8 MG/DL (ref 8.3–10.6)
CHLORIDE BLD-SCNC: 116 MMOL/L (ref 99–110)
CO2: 17 MMOL/L (ref 21–32)
CREAT SERPL-MCNC: 1.2 MG/DL (ref 0.8–1.3)
EOSINOPHILS ABSOLUTE: 0.1 K/UL (ref 0–0.6)
EOSINOPHILS RELATIVE PERCENT: 3 %
GFR AFRICAN AMERICAN: >60
GFR NON-AFRICAN AMERICAN: >60
GLOBULIN: 3.1 G/DL
GLUCOSE BLD-MCNC: 81 MG/DL (ref 70–99)
HCT VFR BLD CALC: 27.2 % (ref 40.5–52.5)
HEMOGLOBIN: 9.1 G/DL (ref 13.5–17.5)
LACTIC ACID: 2.8 MMOL/L (ref 0.4–2)
LACTIC ACID: 3 MMOL/L (ref 0.4–2)
LACTIC ACID: 3.5 MMOL/L (ref 0.4–2)
LYMPHOCYTES ABSOLUTE: 1.7 K/UL (ref 1–5.1)
LYMPHOCYTES RELATIVE PERCENT: 35.5 %
MCH RBC QN AUTO: 35 PG (ref 26–34)
MCHC RBC AUTO-ENTMCNC: 33.4 G/DL (ref 31–36)
MCV RBC AUTO: 104.7 FL (ref 80–100)
MONOCYTES ABSOLUTE: 0.1 K/UL (ref 0–1.3)
MONOCYTES RELATIVE PERCENT: 3 %
NEUTROPHILS ABSOLUTE: 2.7 K/UL (ref 1.7–7.7)
NEUTROPHILS RELATIVE PERCENT: 57.7 %
PDW BLD-RTO: 16.5 % (ref 12.4–15.4)
PLATELET # BLD: 44 K/UL (ref 135–450)
PMV BLD AUTO: 8.7 FL (ref 5–10.5)
POTASSIUM SERPL-SCNC: 2.8 MMOL/L (ref 3.5–5.1)
POTASSIUM SERPL-SCNC: 3.2 MMOL/L (ref 3.5–5.1)
POTASSIUM SERPL-SCNC: 3.2 MMOL/L (ref 3.5–5.1)
RBC # BLD: 2.59 M/UL (ref 4.2–5.9)
SODIUM BLD-SCNC: 146 MMOL/L (ref 136–145)
TOTAL PROTEIN: 5.7 G/DL (ref 6.4–8.2)
WBC # BLD: 4.7 K/UL (ref 4–11)

## 2019-08-03 PROCEDURE — 2580000003 HC RX 258: Performed by: NURSE PRACTITIONER

## 2019-08-03 PROCEDURE — 6360000002 HC RX W HCPCS: Performed by: INTERNAL MEDICINE

## 2019-08-03 PROCEDURE — 80053 COMPREHEN METABOLIC PANEL: CPT

## 2019-08-03 PROCEDURE — 1200000000 HC SEMI PRIVATE

## 2019-08-03 PROCEDURE — 6360000002 HC RX W HCPCS: Performed by: NURSE PRACTITIONER

## 2019-08-03 PROCEDURE — 85025 COMPLETE CBC W/AUTO DIFF WBC: CPT

## 2019-08-03 PROCEDURE — 94760 N-INVAS EAR/PLS OXIMETRY 1: CPT

## 2019-08-03 PROCEDURE — 36415 COLL VENOUS BLD VENIPUNCTURE: CPT

## 2019-08-03 PROCEDURE — 99233 SBSQ HOSP IP/OBS HIGH 50: CPT | Performed by: INTERNAL MEDICINE

## 2019-08-03 PROCEDURE — 84132 ASSAY OF SERUM POTASSIUM: CPT

## 2019-08-03 PROCEDURE — 83605 ASSAY OF LACTIC ACID: CPT

## 2019-08-03 PROCEDURE — 2580000003 HC RX 258: Performed by: INTERNAL MEDICINE

## 2019-08-03 RX ORDER — 0.9 % SODIUM CHLORIDE 0.9 %
1500 INTRAVENOUS SOLUTION INTRAVENOUS ONCE
Status: COMPLETED | OUTPATIENT
Start: 2019-08-03 | End: 2019-08-03

## 2019-08-03 RX ADMIN — Medication 10 MEQ: at 13:21

## 2019-08-03 RX ADMIN — Medication 10 MEQ: at 10:38

## 2019-08-03 RX ADMIN — SODIUM CHLORIDE, POTASSIUM CHLORIDE, SODIUM LACTATE AND CALCIUM CHLORIDE: 600; 310; 30; 20 INJECTION, SOLUTION INTRAVENOUS at 13:21

## 2019-08-03 RX ADMIN — Medication 10 MEQ: at 09:42

## 2019-08-03 RX ADMIN — ENOXAPARIN SODIUM 40 MG: 40 INJECTION SUBCUTANEOUS at 08:46

## 2019-08-03 RX ADMIN — SODIUM CHLORIDE 1500 ML: 9 INJECTION, SOLUTION INTRAVENOUS at 09:36

## 2019-08-03 RX ADMIN — SODIUM CHLORIDE, PRESERVATIVE FREE 10 ML: 5 INJECTION INTRAVENOUS at 08:46

## 2019-08-03 RX ADMIN — SODIUM CHLORIDE, POTASSIUM CHLORIDE, SODIUM LACTATE AND CALCIUM CHLORIDE: 600; 310; 30; 20 INJECTION, SOLUTION INTRAVENOUS at 03:44

## 2019-08-03 RX ADMIN — Medication 10 MEQ: at 11:45

## 2019-08-03 RX ADMIN — Medication 10 MEQ: at 08:46

## 2019-08-03 RX ADMIN — PIPERACILLIN SODIUM,TAZOBACTAM SODIUM 3.38 G: 3; .375 INJECTION, POWDER, FOR SOLUTION INTRAVENOUS at 06:32

## 2019-08-03 RX ADMIN — PIPERACILLIN SODIUM,TAZOBACTAM SODIUM 3.38 G: 3; .375 INJECTION, POWDER, FOR SOLUTION INTRAVENOUS at 14:43

## 2019-08-03 RX ADMIN — Medication 10 MEQ: at 06:57

## 2019-08-03 RX ADMIN — PIPERACILLIN SODIUM,TAZOBACTAM SODIUM 3.38 G: 3; .375 INJECTION, POWDER, FOR SOLUTION INTRAVENOUS at 23:44

## 2019-08-03 ASSESSMENT — PAIN SCALES - GENERAL
PAINLEVEL_OUTOF10: 0

## 2019-08-03 NOTE — PROGRESS NOTES
Patient's potassium noted to be 2.8 this morning. Started patient on 1st dose of potassium per potassium protocol (see MAR). Will continue to monitor.     Electronically signed by Danna Darling RN on 8/3/2019 at 6:54 AM

## 2019-08-03 NOTE — PROGRESS NOTES
Surgical History:   Procedure Laterality Date    COLONOSCOPY      COLONOSCOPY  08/22/2018    hemorrhoids    COLONOSCOPY N/A 8/2/2019    COLONOSCOPY DIAGNOSTIC performed by Lulu Coates MD at [facility]      history of esophageal dilitation    UPPER GASTROINTESTINAL ENDOSCOPY  01/04/2018       Current Medications:    Outpatient Medications Marked as Taking for the 8/1/19 encounter [facility] Encounter)   Medication Sig Dispense Refill    midodrine (PROAMATINE) 5 MG tablet Take 5 mg by mouth 2 times daily      potassium chloride (MICRO-K) 10 MEQ extended release capsule Take 10 mEq by mouth daily      acetaminophen (TYLENOL) 325 MG tablet Take 650 mg by mouth every 4 hours as needed for Pain      torsemide (DEMADEX) 10 MG tablet Take 1 tablet by mouth every other day 30 tablet 0    lactulose (CHRONULAC) 10 GM/15ML solution Take 30 mLs by mouth 3 times daily 2700 mL 0    rifaximin (XIFAXAN) 550 MG tablet Take 1 tablet by mouth 2 times daily 42 tablet 0    omeprazole (PRILOSEC) 20 MG delayed release capsule Take 20 mg by mouth nightly      amiodarone (CORDARONE) 200 MG tablet Take 200 mg by mouth daily      DULoxetine (CYMBALTA) 30 MG extended release capsule Take 30 mg by mouth daily      tamsulosin (FLOMAX) 0.4 MG capsule Take 0.4 mg by mouth nightly      miconazole (MICOTIN) 2 % powder Apply topically 2 times daily Apply topically 2 times daily to scrotum      mupirocin (BACTROBAN) 2 % cream Apply topically 3 times daily as needed (redness to suprapubic catheter insertion area) Apply topically 3 times daily.        vitamin B-1 (THIAMINE) 100 MG tablet Take 100 mg by mouth daily      magnesium chloride (MAG DELAY) 535 (64 Mg) MG TBCR extended release tablet Take 64 mg by mouth daily       folic acid (FOLVITE) 968 MCG tablet 1 tablet by mouth daily      nadolol (CORGARD) 20 MG tablet Take 20 mg by mouth daily          Allergies:  Linezolid    Immunizations : There is no immunization history on file for this patient. Social History:     Social History     Tobacco Use    Smoking status: Never Smoker    Smokeless tobacco: Never Used   Substance Use Topics    Alcohol use: Yes     Alcohol/week: 1.0 standard drinks     Types: 1 Cans of beer per week     Comment: history of drinking a bottle of whiskey a day 1can a day    Drug use: No     Social History     Tobacco Use   Smoking Status Never Smoker   Smokeless Tobacco Never Used      Family History   Problem Relation Age of Onset    Emphysema Father           REVIEW OF SYSTEMS:    No fever / chills / sweats. No weight loss. No visual change, eye pain, eye discharge. No oral lesion, sore throat, dysphagia. Denies cough / sputum/Sob   Denies chest pain, palpitations/ dizziness  Denies nausea/ vomiting/abdominal pain/diarrhea. Denies dysuria or change in urinary function. Denies joint swelling or pain. No myalgia, arthralgia. No rashes, skin lesions   Denies focal weakness, sensory change or other neurologic symptoms  No lymph node swelling or tenderness.     Change in mentation+ low platelets encephalopathy+     PHYSICAL EXAM:      Vitals:    BP (!) 156/79   Pulse 67   Temp 97.8 °F (36.6 °C) (Oral)   Resp 16   Ht 5' 6\" (1.676 m)   Wt 210 lb 12.2 oz (95.6 kg)   SpO2 99%   BMI 34.02 kg/m²   General Appearance: more alert+    acute distress, ++ pallor, ++ icterus   Skin: warm and dry, no rash or erythema  Head: normocephalic and atraumatic  Eyes: pupils equal, round, and reactive to light, conjunctivae normal  ENT: tympanic membrane, external ear and ear canal normal bilaterally, nose without deformity, nasal mucosa and turbinates normal without polyps  Neck: supple and non-tender without mass, no thyromegaly  no cervical lymphadenopathy  Pulmonary/Chest: clear to auscultation bilaterally- no wheezes, rales or rhonchi, normal air movement, no respiratory distress  Cardiovascular: normal rate, regular piperacillin-tazobactam  3.375 g Intravenous Q8H    sodium chloride flush  10 mL Intravenous 2 times per day       Continuous Infusions:   lactated ringers 100 mL/hr at 08/03/19 0344       PRN Meds:  sodium chloride flush, magnesium hydroxide, ondansetron, potassium chloride **OR** potassium alternative oral replacement **OR** potassium chloride      Assessment:   Change in Mentation  Lactic acidosis  Cirrhois of LIVER with portal HTN  CVA history   H/o Hepatic encephalopathy  ? Dementia  CT abd/pelvis on this admit ? Distended cecum  Supra pubic catheter  JAIME on this admit  Urine cx from Supra pubic catheters not helpful at all     Suspect change in mentation multifactorial with on going encephalopathy and he has lactic acidosis and this could be from Linezolid use with associated Encephalopathy. Ammonia on this admit normal and CT abd/pelvis some concern for intra abdominal process.      Some clinical improvement and lactic acid still elevated suspect this to be from LINEZOLID and if Blood cx negative will d/c Zosyn soon     Labs, Microbiology, Radiology and all the pertinent results from current hospitalization and  care every where were reviewed  by me as a part of the evaluation   Plan:   1. Linezolid list as allergy  due to side effects and VRE in Urine does not need treatment  2. Correct Lactic acidosis  3. Watch ammonia  4. Watch platelets  5. IV Zosyn x 3.375 gm x Q 8 HRS will d/c if Blood cx Negative   6. Watch abdomen for any worsening of Cecal distension      Discussed with patient/Family d/w RN    Thanks for allowing me to participate in your patient's care and please call me with any questions or concerns.     Bakari Wolff MD  Infectious Disease  [facility]) Physician  Phone: [phone]   Fax : [phone]

## 2019-08-03 NOTE — PROGRESS NOTES
--   --  16   CREATININE 1.6*  --  1.4*  --   --  1.2   GLUCOSE 121*  --  108*  --   --  81   CALCIUM 9.7  --  8.9  --   --  8.8   PROT  --   --  5.7*  --   --  5.7*   LABALBU  --   --  2.4*  --   --  2.6*   AST  --   --  39*  --   --  40*   ALT  --   --  17  --   --  18   ALKPHOS  --   --  85  --   --  72   BILITOT  --   --  1.4*  --   --  1.8*   AMMONIA 45  --   --   --   --   --    MG 2.20  --  1.90  --   --   --     < > = values in this interval not displayed. Assessment:     Patient Active Problem List    Diagnosis Date Noted    Cecal volvulus ([facility]     Altered mental status     Elevated lactic acid level 08/02/2019    Abdominal pain 08/01/2019    JAIME (acute kidney injury) ([facility] 08/01/2019    Acute metabolic encephalopathy 09/54/5269    Hyperammonemia ([facility] 05/24/2019    Thrombocytopenia ([facility] 05/24/2019    Hypokalemia 05/24/2019    Hypomagnesemia 05/24/2019    Hyperbilirubinemia 05/24/2019    Urinary tract infection associated with cystostomy catheter ([facility] 05/24/2019    Generalized weakness 05/24/2019    Paroxysmal atrial fibrillation ([facility] 86/21/0987    Alcoholic cirrhosis ([facility]     Dementia        Plan:   1. No volvulus. Can advance diet.

## 2019-08-04 LAB
A/G RATIO: 0.7 (ref 1.1–2.2)
ALBUMIN SERPL-MCNC: 2.4 G/DL (ref 3.4–5)
ALP BLD-CCNC: 65 U/L (ref 40–129)
ALT SERPL-CCNC: 16 U/L (ref 10–40)
ANION GAP SERPL CALCULATED.3IONS-SCNC: 11 MMOL/L (ref 3–16)
AST SERPL-CCNC: 36 U/L (ref 15–37)
BASOPHILS ABSOLUTE: 0 K/UL (ref 0–0.2)
BASOPHILS RELATIVE PERCENT: 0.8 %
BILIRUB SERPL-MCNC: 1.6 MG/DL (ref 0–1)
BUN BLDV-MCNC: 10 MG/DL (ref 7–20)
CALCIUM SERPL-MCNC: 8.7 MG/DL (ref 8.3–10.6)
CHLORIDE BLD-SCNC: 114 MMOL/L (ref 99–110)
CO2: 20 MMOL/L (ref 21–32)
CREAT SERPL-MCNC: 1 MG/DL (ref 0.8–1.3)
EOSINOPHILS ABSOLUTE: 0.1 K/UL (ref 0–0.6)
EOSINOPHILS RELATIVE PERCENT: 3.2 %
GFR AFRICAN AMERICAN: >60
GFR NON-AFRICAN AMERICAN: >60
GLOBULIN: 3.3 G/DL
GLUCOSE BLD-MCNC: 84 MG/DL (ref 70–99)
HCT VFR BLD CALC: 22.9 % (ref 40.5–52.5)
HEMOGLOBIN: 7.8 G/DL (ref 13.5–17.5)
LACTIC ACID: 1.8 MMOL/L (ref 0.4–2)
LYMPHOCYTES ABSOLUTE: 1.3 K/UL (ref 1–5.1)
LYMPHOCYTES RELATIVE PERCENT: 42.9 %
MCH RBC QN AUTO: 34.8 PG (ref 26–34)
MCHC RBC AUTO-ENTMCNC: 33.9 G/DL (ref 31–36)
MCV RBC AUTO: 102.7 FL (ref 80–100)
MONOCYTES ABSOLUTE: 0.1 K/UL (ref 0–1.3)
MONOCYTES RELATIVE PERCENT: 2.9 %
NEUTROPHILS ABSOLUTE: 1.5 K/UL (ref 1.7–7.7)
NEUTROPHILS RELATIVE PERCENT: 50.2 %
PDW BLD-RTO: 16.2 % (ref 12.4–15.4)
PLATELET # BLD: 34 K/UL (ref 135–450)
PLATELET SLIDE REVIEW: ABNORMAL
PMV BLD AUTO: 8.4 FL (ref 5–10.5)
POTASSIUM SERPL-SCNC: 2.6 MMOL/L (ref 3.5–5.1)
POTASSIUM SERPL-SCNC: 3.2 MMOL/L (ref 3.5–5.1)
RBC # BLD: 2.23 M/UL (ref 4.2–5.9)
SLIDE REVIEW: ABNORMAL
SODIUM BLD-SCNC: 145 MMOL/L (ref 136–145)
TOTAL PROTEIN: 5.7 G/DL (ref 6.4–8.2)
WBC # BLD: 3.1 K/UL (ref 4–11)

## 2019-08-04 PROCEDURE — 6370000000 HC RX 637 (ALT 250 FOR IP): Performed by: INTERNAL MEDICINE

## 2019-08-04 PROCEDURE — 2500000003 HC RX 250 WO HCPCS: Performed by: INTERNAL MEDICINE

## 2019-08-04 PROCEDURE — 99233 SBSQ HOSP IP/OBS HIGH 50: CPT | Performed by: INTERNAL MEDICINE

## 2019-08-04 PROCEDURE — 2580000003 HC RX 258: Performed by: INTERNAL MEDICINE

## 2019-08-04 PROCEDURE — 80053 COMPREHEN METABOLIC PANEL: CPT

## 2019-08-04 PROCEDURE — 85025 COMPLETE CBC W/AUTO DIFF WBC: CPT

## 2019-08-04 PROCEDURE — 83605 ASSAY OF LACTIC ACID: CPT

## 2019-08-04 PROCEDURE — 6360000002 HC RX W HCPCS: Performed by: NURSE PRACTITIONER

## 2019-08-04 PROCEDURE — 36415 COLL VENOUS BLD VENIPUNCTURE: CPT

## 2019-08-04 PROCEDURE — 6360000002 HC RX W HCPCS: Performed by: INTERNAL MEDICINE

## 2019-08-04 PROCEDURE — 84132 ASSAY OF SERUM POTASSIUM: CPT

## 2019-08-04 PROCEDURE — 94760 N-INVAS EAR/PLS OXIMETRY 1: CPT

## 2019-08-04 PROCEDURE — 2580000003 HC RX 258: Performed by: NURSE PRACTITIONER

## 2019-08-04 PROCEDURE — 1200000000 HC SEMI PRIVATE

## 2019-08-04 RX ORDER — SODIUM CHLORIDE 0.9 % (FLUSH) 0.9 %
10 SYRINGE (ML) INJECTION PRN
Status: DISCONTINUED | OUTPATIENT
Start: 2019-08-04 | End: 2019-08-04 | Stop reason: SDUPTHER

## 2019-08-04 RX ORDER — NADOLOL 20 MG/1
20 TABLET ORAL DAILY
Status: DISCONTINUED | OUTPATIENT
Start: 2019-08-04 | End: 2019-08-19 | Stop reason: HOSPADM

## 2019-08-04 RX ORDER — AMIODARONE HYDROCHLORIDE 200 MG/1
200 TABLET ORAL DAILY
Status: DISCONTINUED | OUTPATIENT
Start: 2019-08-04 | End: 2019-08-19 | Stop reason: HOSPADM

## 2019-08-04 RX ORDER — THIAMINE MONONITRATE (VIT B1) 100 MG
100 TABLET ORAL DAILY
Status: DISCONTINUED | OUTPATIENT
Start: 2019-08-04 | End: 2019-08-19 | Stop reason: HOSPADM

## 2019-08-04 RX ORDER — SODIUM CHLORIDE 0.9 % (FLUSH) 0.9 %
10 SYRINGE (ML) INJECTION EVERY 12 HOURS SCHEDULED
Status: DISCONTINUED | OUTPATIENT
Start: 2019-08-04 | End: 2019-08-04 | Stop reason: SDUPTHER

## 2019-08-04 RX ORDER — POTASSIUM CHLORIDE 750 MG/1
10 TABLET, FILM COATED, EXTENDED RELEASE ORAL DAILY
Status: DISCONTINUED | OUTPATIENT
Start: 2019-08-04 | End: 2019-08-17

## 2019-08-04 RX ORDER — LIDOCAINE HYDROCHLORIDE 10 MG/ML
5 INJECTION, SOLUTION EPIDURAL; INFILTRATION; INTRACAUDAL; PERINEURAL ONCE
Status: DISCONTINUED | OUTPATIENT
Start: 2019-08-04 | End: 2019-08-19 | Stop reason: HOSPADM

## 2019-08-04 RX ORDER — DULOXETIN HYDROCHLORIDE 30 MG/1
30 CAPSULE, DELAYED RELEASE ORAL DAILY
Status: DISCONTINUED | OUTPATIENT
Start: 2019-08-04 | End: 2019-08-19 | Stop reason: HOSPADM

## 2019-08-04 RX ORDER — FOLIC ACID 1 MG/1
1 TABLET ORAL DAILY
Status: DISCONTINUED | OUTPATIENT
Start: 2019-08-04 | End: 2019-08-19 | Stop reason: HOSPADM

## 2019-08-04 RX ADMIN — Medication 10 MEQ: at 12:30

## 2019-08-04 RX ADMIN — PIPERACILLIN SODIUM,TAZOBACTAM SODIUM 3.38 G: 3; .375 INJECTION, POWDER, FOR SOLUTION INTRAVENOUS at 10:29

## 2019-08-04 RX ADMIN — Medication 10 MEQ: at 14:46

## 2019-08-04 RX ADMIN — RIFAXIMIN 550 MG: 550 TABLET ORAL at 21:33

## 2019-08-04 RX ADMIN — Medication 10 MEQ: at 10:29

## 2019-08-04 RX ADMIN — SODIUM CHLORIDE, POTASSIUM CHLORIDE, SODIUM LACTATE AND CALCIUM CHLORIDE: 600; 310; 30; 20 INJECTION, SOLUTION INTRAVENOUS at 18:59

## 2019-08-04 RX ADMIN — MICONAZOLE NITRATE: 20 POWDER TOPICAL at 21:33

## 2019-08-04 RX ADMIN — Medication 100 MG: at 09:41

## 2019-08-04 RX ADMIN — POTASSIUM CHLORIDE 10 MEQ: 750 TABLET, FILM COATED, EXTENDED RELEASE ORAL at 09:41

## 2019-08-04 RX ADMIN — NADOLOL 20 MG: 20 TABLET ORAL at 10:44

## 2019-08-04 RX ADMIN — PIPERACILLIN SODIUM,TAZOBACTAM SODIUM 3.38 G: 3; .375 INJECTION, POWDER, FOR SOLUTION INTRAVENOUS at 23:57

## 2019-08-04 RX ADMIN — MICONAZOLE NITRATE: 20 POWDER TOPICAL at 10:44

## 2019-08-04 RX ADMIN — Medication 1 TABLET: at 10:44

## 2019-08-04 RX ADMIN — RIFAXIMIN 550 MG: 550 TABLET ORAL at 09:41

## 2019-08-04 RX ADMIN — AMIODARONE HYDROCHLORIDE 200 MG: 200 TABLET ORAL at 09:41

## 2019-08-04 RX ADMIN — Medication 10 MEQ: at 11:32

## 2019-08-04 RX ADMIN — FOLIC ACID 1 MG: 1 TABLET ORAL at 09:41

## 2019-08-04 RX ADMIN — Medication 10 MEQ: at 13:44

## 2019-08-04 RX ADMIN — SODIUM CHLORIDE, POTASSIUM CHLORIDE, SODIUM LACTATE AND CALCIUM CHLORIDE: 600; 310; 30; 20 INJECTION, SOLUTION INTRAVENOUS at 05:27

## 2019-08-04 RX ADMIN — Medication 10 MEQ: at 16:02

## 2019-08-04 RX ADMIN — DULOXETINE HYDROCHLORIDE 30 MG: 30 CAPSULE, DELAYED RELEASE ORAL at 09:41

## 2019-08-04 RX ADMIN — PIPERACILLIN SODIUM,TAZOBACTAM SODIUM 3.38 G: 3; .375 INJECTION, POWDER, FOR SOLUTION INTRAVENOUS at 15:02

## 2019-08-04 ASSESSMENT — PAIN SCALES - GENERAL
PAINLEVEL_OUTOF10: 0

## 2019-08-04 NOTE — PROGRESS NOTES
Hospitalist Progress Note      PCP: MOI MOURA    Date of Admission: 8/1/2019        Subjective: pleasant, denies chest pain, nausea, vomiting or abdominal pain. Medications:  Reviewed    Infusion Medications    lactated ringers 100 mL/hr at 08/04/19 0527     Scheduled Medications    piperacillin-tazobactam  3.375 g Intravenous Q8H    sodium chloride flush  10 mL Intravenous 2 times per day     PRN Meds: sodium chloride flush, magnesium hydroxide, ondansetron, potassium chloride **OR** potassium alternative oral replacement **OR** potassium chloride      Intake/Output Summary (Last 24 hours) at 8/4/2019 0849  Last data filed at 8/4/2019 6762  Gross per 24 hour   Intake 5099.35 ml   Output 2300 ml   Net 2799.35 ml       Physical Exam Performed:    BP (!) 163/84   Pulse 67   Temp 97.9 °F (36.6 °C) (Oral)   Resp 18   Ht 5' 6\" (1.676 m)   Wt 209 lb 7 oz (95 kg)   SpO2 93%   BMI 33.80 kg/m²     General appearance: No apparent distress  Neck: Supple  Respiratory:  Normal respiratory effort. Clear to auscultation, bilaterally without Rales/Wheezes/Rhonchi. Cardiovascular: Regular rate and rhythm with normal S1/S2 without murmurs, rubs or gallops. Abdomen: Soft, non-tender, non-distended   Musculoskeletal: No clubbing  Skin: Skin color, texture, turgor normal.  No rashes or lesions. Psychiatric: Alert   Capillary Refill: Brisk,< 3 seconds   Peripheral Pulses: +2 palpable, equal bilaterally       Labs:   Recent Labs     08/02/19 0522 08/03/19 0557 08/04/19  0640   WBC 4.7 4.7 3.1*   HGB 8.3* 9.1* 7.8*   HCT 24.8* 27.2* 22.9*   PLT 49* 44* 34*     Recent Labs     08/02/19 0522 08/03/19  0557 08/03/19 2032 08/04/19  0640     --  146*  --  145   K 2.6*   < > 2.8* 3.2* 2.6*     --  116*  --  114*   CO2 19*  --  17*  --  20*   BUN 22*  --  16  --  10   CREATININE 1.4*  --  1.2  --  1.0   CALCIUM 8.9  --  8.8  --  8.7    < > = values in this interval not displayed.      Recent Labs

## 2019-08-04 NOTE — PROGRESS NOTES
Gastroenterology Note  Patient:   Yael Marroquin   :    1950   Facility:   [facility]   Date:     2019  Consultant:   Warren Gibbons MD      Subjective:     77 y.o. male admitted 2019 with Abdominal pain [R10.9] and seen for cirrhosis. .    Present  Diet Order: DIET GENERAL;      Current Medications include:   Scheduled Meds:   potassium chloride  10 mEq Oral Daily    amiodarone  200 mg Oral Daily    DULoxetine  30 mg Oral Daily    folic acid  1 mg Oral Daily    miconazole   Topical BID    nadolol  20 mg Oral Daily    rifaximin  550 mg Oral BID    vitamin B-1  100 mg Oral Daily    magnesium cl-calcium carbonate  1 tablet Oral Daily    piperacillin-tazobactam  3.375 g Intravenous Q8H    sodium chloride flush  10 mL Intravenous 2 times per day     Continuous Infusions:   lactated ringers 100 mL/hr at 19 0527     PRN Meds:.sodium chloride flush, magnesium hydroxide, ondansetron, potassium chloride **OR** potassium alternative oral replacement **OR** potassium chloride    Allergies:    Allergies   Allergen Reactions    Linezolid Other (See Comments)     Lactic acidosis and encephalopathy       Objective:   Vital Signs:  Temp (24hrs), Av.8 °F (36.6 °C), Min:97.4 °F (36.3 °C), Max:98 °F (26.1 °C)     Systolic (22AGL), ZWL:521 , Min:128 , CAW:105      Diastolic (04GJK), TRJ:81, Min:60, Max:84     Pulse  Av  Min: 65  Max: 70  BP (!) 163/84   Pulse 67   Temp 97.9 °F (36.6 °C) (Oral)   Resp 18   Ht 5' 6\" (1.676 m)   Wt 209 lb 7 oz (95 kg)   SpO2 93%   BMI 33.80 kg/m²      Physical Exam:   General appearance: alert and appears stated age  Lungs: clear to auscultation bilaterally  Heart: regular rate and rhythm, S1, S2 normal, no murmur, click, rub or gallop  Abdomen: soft, non-tender; bowel sounds normal; no masses,  no organomegaly    Lab and Imaging Review   Recent Labs     195  [birth year]22  1956 19 08/04/19  0640   WBC 4.7  --  4.7  --  4.7  --  3.1*   HGB 8.8*  --  8.3*  --  9.1*  --  7.8*   .0*  --  104.3*  --  104.7*  --  102.7*   PLT 67*  --  49*  --  44*  --  34*     --  142  --  146*  --  145   K 2.7*   < > 2.6*   < > 2.8* 3.2* 2.6*     --  109  --  116*  --  114*   CO2 22  --  19*  --  17*  --  20*   BUN 27*  --  22*  --  16  --  10   CREATININE 1.6*  --  1.4*  --  1.2  --  1.0   GLUCOSE 121*  --  108*  --  81  --  84   CALCIUM 9.7  --  8.9  --  8.8  --  8.7   PROT  --   --  5.7*  --  5.7*  --  5.7*   LABALBU  --   --  2.4*  --  2.6*  --  2.4*   AST  --   --  39*  --  40*  --  36   ALT  --   --  17  --  18  --  16   ALKPHOS  --   --  85  --  72  --  65   BILITOT  --   --  1.4*  --  1.8*  --  1.6*   AMMONIA 45  --   --   --   --   --   --    MG 2.20  --  1.90  --   --   --   --     < > = values in this interval not displayed. Assessment:     Patient Active Problem List    Diagnosis Date Noted    Cecal volvulus ([facility]     Altered mental status     Elevated lactic acid level 08/02/2019    Abdominal pain 08/01/2019    JAIME (acute kidney injury) ([facility] 08/01/2019    Acute metabolic encephalopathy 03/10/8883    Hyperammonemia ([facility] 05/24/2019    Thrombocytopenia ([facility] 05/24/2019    Hypokalemia 05/24/2019    Hypomagnesemia 05/24/2019    Hyperbilirubinemia 05/24/2019    Urinary tract infection associated with cystostomy catheter ([facility] 05/24/2019    Generalized weakness 05/24/2019    Paroxysmal atrial fibrillation ([facility] 54/53/4798    Alcoholic cirrhosis ([facility]     Dementia        Plan:   1. Potassium remains low. He is eating. Would consider lactulose and stop magnesium hydroxide.

## 2019-08-05 LAB
A/G RATIO: 0.8 (ref 1.1–2.2)
ALBUMIN SERPL-MCNC: 2.3 G/DL (ref 3.4–5)
ALP BLD-CCNC: 67 U/L (ref 40–129)
ALT SERPL-CCNC: 16 U/L (ref 10–40)
AMMONIA: 35 UMOL/L (ref 16–60)
ANION GAP SERPL CALCULATED.3IONS-SCNC: 9 MMOL/L (ref 3–16)
AST SERPL-CCNC: 37 U/L (ref 15–37)
BASOPHILS ABSOLUTE: 0 K/UL (ref 0–0.2)
BASOPHILS RELATIVE PERCENT: 0.8 %
BILIRUB SERPL-MCNC: 1.7 MG/DL (ref 0–1)
BUN BLDV-MCNC: 9 MG/DL (ref 7–20)
CALCIUM SERPL-MCNC: 8.7 MG/DL (ref 8.3–10.6)
CHLORIDE BLD-SCNC: 110 MMOL/L (ref 99–110)
CO2: 22 MMOL/L (ref 21–32)
CREAT SERPL-MCNC: 1.1 MG/DL (ref 0.8–1.3)
EOSINOPHILS ABSOLUTE: 0.1 K/UL (ref 0–0.6)
EOSINOPHILS RELATIVE PERCENT: 2.9 %
GFR AFRICAN AMERICAN: >60
GFR NON-AFRICAN AMERICAN: >60
GLOBULIN: 2.9 G/DL
GLUCOSE BLD-MCNC: 86 MG/DL (ref 70–99)
HCT VFR BLD CALC: 21.5 % (ref 40.5–52.5)
HEMOGLOBIN: 7.2 G/DL (ref 13.5–17.5)
LACTIC ACID: 2.1 MMOL/L (ref 0.4–2)
LYMPHOCYTES ABSOLUTE: 1.3 K/UL (ref 1–5.1)
LYMPHOCYTES RELATIVE PERCENT: 33.7 %
MAGNESIUM: 1.6 MG/DL (ref 1.8–2.4)
MCH RBC QN AUTO: 33.9 PG (ref 26–34)
MCHC RBC AUTO-ENTMCNC: 33.5 G/DL (ref 31–36)
MCV RBC AUTO: 101.3 FL (ref 80–100)
MONOCYTES ABSOLUTE: 0.2 K/UL (ref 0–1.3)
MONOCYTES RELATIVE PERCENT: 4.3 %
NEUTROPHILS ABSOLUTE: 2.3 K/UL (ref 1.7–7.7)
NEUTROPHILS RELATIVE PERCENT: 58.3 %
ORGANISM: ABNORMAL
PDW BLD-RTO: 16 % (ref 12.4–15.4)
PLATELET # BLD: 29 K/UL (ref 135–450)
PMV BLD AUTO: 9.3 FL (ref 5–10.5)
POTASSIUM SERPL-SCNC: 3.1 MMOL/L (ref 3.5–5.1)
RBC # BLD: 2.13 M/UL (ref 4.2–5.9)
SODIUM BLD-SCNC: 141 MMOL/L (ref 136–145)
TOTAL PROTEIN: 5.2 G/DL (ref 6.4–8.2)
URINE CULTURE, ROUTINE: ABNORMAL
URINE CULTURE, ROUTINE: ABNORMAL
WBC # BLD: 3.9 K/UL (ref 4–11)

## 2019-08-05 PROCEDURE — 83735 ASSAY OF MAGNESIUM: CPT

## 2019-08-05 PROCEDURE — 80053 COMPREHEN METABOLIC PANEL: CPT

## 2019-08-05 PROCEDURE — 2580000003 HC RX 258: Performed by: INTERNAL MEDICINE

## 2019-08-05 PROCEDURE — 2580000003 HC RX 258: Performed by: NURSE PRACTITIONER

## 2019-08-05 PROCEDURE — 2700000000 HC OXYGEN THERAPY PER DAY

## 2019-08-05 PROCEDURE — 1200000000 HC SEMI PRIVATE

## 2019-08-05 PROCEDURE — 36415 COLL VENOUS BLD VENIPUNCTURE: CPT

## 2019-08-05 PROCEDURE — 6370000000 HC RX 637 (ALT 250 FOR IP): Performed by: NURSE PRACTITIONER

## 2019-08-05 PROCEDURE — 99232 SBSQ HOSP IP/OBS MODERATE 35: CPT | Performed by: INTERNAL MEDICINE

## 2019-08-05 PROCEDURE — 6360000002 HC RX W HCPCS: Performed by: INTERNAL MEDICINE

## 2019-08-05 PROCEDURE — 82140 ASSAY OF AMMONIA: CPT

## 2019-08-05 PROCEDURE — 85025 COMPLETE CBC W/AUTO DIFF WBC: CPT

## 2019-08-05 PROCEDURE — 83605 ASSAY OF LACTIC ACID: CPT

## 2019-08-05 PROCEDURE — 94761 N-INVAS EAR/PLS OXIMETRY MLT: CPT

## 2019-08-05 PROCEDURE — 6370000000 HC RX 637 (ALT 250 FOR IP): Performed by: INTERNAL MEDICINE

## 2019-08-05 RX ADMIN — SODIUM CHLORIDE, PRESERVATIVE FREE 10 ML: 5 INJECTION INTRAVENOUS at 09:17

## 2019-08-05 RX ADMIN — RIFAXIMIN 550 MG: 550 TABLET ORAL at 21:46

## 2019-08-05 RX ADMIN — MICONAZOLE NITRATE: 20 POWDER TOPICAL at 21:52

## 2019-08-05 RX ADMIN — AMIODARONE HYDROCHLORIDE 200 MG: 200 TABLET ORAL at 09:16

## 2019-08-05 RX ADMIN — RIFAXIMIN 550 MG: 550 TABLET ORAL at 09:16

## 2019-08-05 RX ADMIN — PIPERACILLIN SODIUM,TAZOBACTAM SODIUM 3.38 G: 3; .375 INJECTION, POWDER, FOR SOLUTION INTRAVENOUS at 06:48

## 2019-08-05 RX ADMIN — FOLIC ACID 1 MG: 1 TABLET ORAL at 09:16

## 2019-08-05 RX ADMIN — DULOXETINE HYDROCHLORIDE 30 MG: 30 CAPSULE, DELAYED RELEASE ORAL at 09:16

## 2019-08-05 RX ADMIN — POTASSIUM CHLORIDE 10 MEQ: 750 TABLET, FILM COATED, EXTENDED RELEASE ORAL at 09:16

## 2019-08-05 RX ADMIN — NADOLOL 20 MG: 20 TABLET ORAL at 09:16

## 2019-08-05 RX ADMIN — POTASSIUM CHLORIDE 40 MEQ: 20 TABLET, EXTENDED RELEASE ORAL at 09:16

## 2019-08-05 RX ADMIN — Medication 1 TABLET: at 09:16

## 2019-08-05 RX ADMIN — Medication 100 MG: at 09:16

## 2019-08-05 RX ADMIN — MICONAZOLE NITRATE: 20 POWDER TOPICAL at 09:16

## 2019-08-05 RX ADMIN — SODIUM CHLORIDE, POTASSIUM CHLORIDE, SODIUM LACTATE AND CALCIUM CHLORIDE: 600; 310; 30; 20 INJECTION, SOLUTION INTRAVENOUS at 23:27

## 2019-08-05 ASSESSMENT — PAIN SCALES - PAIN ASSESSMENT IN ADVANCED DEMENTIA (PAINAD)
FACIALEXPRESSION: 0
BODYLANGUAGE: 0
BREATHING: 0
CONSOLABILITY: 0
TOTALSCORE: 0
NEGVOCALIZATION: 0
CONSOLABILITY: 0
BODYLANGUAGE: 0
NEGVOCALIZATION: 0
TOTALSCORE: 0
TOTALSCORE: 0
CONSOLABILITY: 0
BREATHING: 0
TOTALSCORE: 0
NEGVOCALIZATION: 0
NEGVOCALIZATION: 0
BREATHING: 0
BODYLANGUAGE: 0
FACIALEXPRESSION: 0
FACIALEXPRESSION: 0
BODYLANGUAGE: 0
CONSOLABILITY: 0
FACIALEXPRESSION: 0
BODYLANGUAGE: 0
TOTALSCORE: 0
BREATHING: 0
CONSOLABILITY: 0
FACIALEXPRESSION: 0
NEGVOCALIZATION: 0
BREATHING: 0

## 2019-08-05 ASSESSMENT — PAIN SCALES - WONG BAKER
WONGBAKER_NUMERICALRESPONSE: 0

## 2019-08-05 ASSESSMENT — PAIN SCALES - GENERAL
PAINLEVEL_OUTOF10: 0

## 2019-08-05 NOTE — PROGRESS NOTES
appearing to be folded anteriorly. GS consulted, GI consulted, colonoscopy without ischemia or volvulus, advancing diet. 2. Thought to have UTI, likely colonization, on diflucan, received zyvox, h/o VRE, but currently with thrombocytopenia, we discontinued zyvox and continue to monitor for signs and symptoms of UTI. 3. JAIME, iv fluids for now  4. Elevated LA, WNL today. 5. Severe hypokalemia, still replacing per protocol. Discussed with nurse. 6. Alcoholic cirrhosis, monitor for now  7. Thrombocytopenia, likely liver disease related, keeps worsening, no signs of active bleeding. Will hold lovenox. 8. Acute encephalopathy, metabolic ,multofactorial, monitor for now, treatment as above. Appears drowsy today. Check ammonia and consider starting PO lactulose. 9. Generalized weakness, as above.   10. Essential hypertension, we will start po meds, was hypotensive on presentation.        DVT Prophylaxis: SCD  Diet: DIET GENERAL;  Code Status: Full Code        Dispo - Ongoing management     Kateryna Weathers MD

## 2019-08-05 NOTE — PROGRESS NOTES
Microbiology, Radiology and all the pertinent results from current hospitalization and  care every where were reviewed  by me as a part of the evaluation   Plan:   1. Linezolid list as allergy  due to side effects and VRE in Urine does not need treatment  2. Correct Lactic acidosis  3. Watch ammonia  4. Watch platelets  5. D/c IV Zosyn   6. Watch abdomen for any worsening of Cecal distension  7.s/p Colonoscopy normal no volvulus  8. URINE CULTURE needs no Treatment as this is Colonization of Supra pubic catheter and Urine cultures not helpful   9. [id number] [address]illo for d/c planning when ok with primary         Discussed with patient/Family d/w RN    Thanks for allowing me to participate in your patient's care and please call me with any questions or concerns.     Arnold Keita MD  Infectious Disease  [address] Physician  Phone: [phone]   Fax : [phone]

## 2019-08-05 NOTE — PROGRESS NOTES
Pt more awake when family at bedside. Wife currently in room. Pt remains alert to self only. Pt continues with poor appetite. Wife said she'll try to get pt to eat more. I&O monitored. IV fluids infusing. Suprapubic cath remains in place. Will monitor.    Electronically signed by Helder Shaffer RN on 8/5/2019 at 4:34 PM

## 2019-08-06 LAB
A/G RATIO: 0.8 (ref 1.1–2.2)
ALBUMIN SERPL-MCNC: 2.6 G/DL (ref 3.4–5)
ALP BLD-CCNC: 81 U/L (ref 40–129)
ALT SERPL-CCNC: 17 U/L (ref 10–40)
ANION GAP SERPL CALCULATED.3IONS-SCNC: 10 MMOL/L (ref 3–16)
AST SERPL-CCNC: 36 U/L (ref 15–37)
BASOPHILS ABSOLUTE: 0 K/UL (ref 0–0.2)
BASOPHILS RELATIVE PERCENT: 0.7 %
BILIRUB SERPL-MCNC: 1.6 MG/DL (ref 0–1)
BUN BLDV-MCNC: 8 MG/DL (ref 7–20)
CALCIUM SERPL-MCNC: 8.8 MG/DL (ref 8.3–10.6)
CHLORIDE BLD-SCNC: 107 MMOL/L (ref 99–110)
CO2: 22 MMOL/L (ref 21–32)
CREAT SERPL-MCNC: 1 MG/DL (ref 0.8–1.3)
EOSINOPHILS ABSOLUTE: 0.1 K/UL (ref 0–0.6)
EOSINOPHILS RELATIVE PERCENT: 2.2 %
GFR AFRICAN AMERICAN: >60
GFR NON-AFRICAN AMERICAN: >60
GLOBULIN: 3.2 G/DL
GLUCOSE BLD-MCNC: 97 MG/DL (ref 70–99)
HCT VFR BLD CALC: 21.5 % (ref 40.5–52.5)
HEMOGLOBIN: 7.3 G/DL (ref 13.5–17.5)
LYMPHOCYTES ABSOLUTE: 1.1 K/UL (ref 1–5.1)
LYMPHOCYTES RELATIVE PERCENT: 29.7 %
MAGNESIUM: 1.6 MG/DL (ref 1.8–2.4)
MCH RBC QN AUTO: 34.7 PG (ref 26–34)
MCHC RBC AUTO-ENTMCNC: 34.1 G/DL (ref 31–36)
MCV RBC AUTO: 101.8 FL (ref 80–100)
MONOCYTES ABSOLUTE: 0.2 K/UL (ref 0–1.3)
MONOCYTES RELATIVE PERCENT: 6.5 %
NEUTROPHILS ABSOLUTE: 2.3 K/UL (ref 1.7–7.7)
NEUTROPHILS RELATIVE PERCENT: 60.9 %
PDW BLD-RTO: 15.8 % (ref 12.4–15.4)
PLATELET # BLD: 23 K/UL (ref 135–450)
PMV BLD AUTO: 8.3 FL (ref 5–10.5)
POTASSIUM REFLEX MAGNESIUM: 3.1 MMOL/L (ref 3.5–5.1)
RBC # BLD: 2.12 M/UL (ref 4.2–5.9)
SODIUM BLD-SCNC: 139 MMOL/L (ref 136–145)
TOTAL PROTEIN: 5.8 G/DL (ref 6.4–8.2)
WBC # BLD: 3.7 K/UL (ref 4–11)

## 2019-08-06 PROCEDURE — 85025 COMPLETE CBC W/AUTO DIFF WBC: CPT

## 2019-08-06 PROCEDURE — 2580000003 HC RX 258: Performed by: NURSE PRACTITIONER

## 2019-08-06 PROCEDURE — 80053 COMPREHEN METABOLIC PANEL: CPT

## 2019-08-06 PROCEDURE — 99232 SBSQ HOSP IP/OBS MODERATE 35: CPT | Performed by: INTERNAL MEDICINE

## 2019-08-06 PROCEDURE — 1200000000 HC SEMI PRIVATE

## 2019-08-06 PROCEDURE — 2700000000 HC OXYGEN THERAPY PER DAY

## 2019-08-06 PROCEDURE — 83735 ASSAY OF MAGNESIUM: CPT

## 2019-08-06 PROCEDURE — 36415 COLL VENOUS BLD VENIPUNCTURE: CPT

## 2019-08-06 PROCEDURE — 6370000000 HC RX 637 (ALT 250 FOR IP): Performed by: INTERNAL MEDICINE

## 2019-08-06 PROCEDURE — 6370000000 HC RX 637 (ALT 250 FOR IP): Performed by: NURSE PRACTITIONER

## 2019-08-06 PROCEDURE — 94761 N-INVAS EAR/PLS OXIMETRY MLT: CPT

## 2019-08-06 RX ADMIN — AMIODARONE HYDROCHLORIDE 200 MG: 200 TABLET ORAL at 08:27

## 2019-08-06 RX ADMIN — POTASSIUM CHLORIDE 10 MEQ: 750 TABLET, FILM COATED, EXTENDED RELEASE ORAL at 08:27

## 2019-08-06 RX ADMIN — MICONAZOLE NITRATE: 20 POWDER TOPICAL at 20:42

## 2019-08-06 RX ADMIN — RIFAXIMIN 550 MG: 550 TABLET ORAL at 20:42

## 2019-08-06 RX ADMIN — Medication 1 TABLET: at 08:28

## 2019-08-06 RX ADMIN — NADOLOL 20 MG: 20 TABLET ORAL at 08:28

## 2019-08-06 RX ADMIN — DULOXETINE HYDROCHLORIDE 30 MG: 30 CAPSULE, DELAYED RELEASE ORAL at 08:27

## 2019-08-06 RX ADMIN — Medication 100 MG: at 08:27

## 2019-08-06 RX ADMIN — FOLIC ACID 1 MG: 1 TABLET ORAL at 08:28

## 2019-08-06 RX ADMIN — SODIUM CHLORIDE, POTASSIUM CHLORIDE, SODIUM LACTATE AND CALCIUM CHLORIDE: 600; 310; 30; 20 INJECTION, SOLUTION INTRAVENOUS at 11:34

## 2019-08-06 RX ADMIN — MICONAZOLE NITRATE: 20 POWDER TOPICAL at 11:33

## 2019-08-06 RX ADMIN — POTASSIUM BICARBONATE 40 MEQ: 782 TABLET, EFFERVESCENT ORAL at 11:34

## 2019-08-06 RX ADMIN — RIFAXIMIN 550 MG: 550 TABLET ORAL at 08:28

## 2019-08-06 ASSESSMENT — PAIN SCALES - PAIN ASSESSMENT IN ADVANCED DEMENTIA (PAINAD)
BODYLANGUAGE: 0
BODYLANGUAGE: 0
FACIALEXPRESSION: 0
BREATHING: 0
BREATHING: 0
TOTALSCORE: 0
BODYLANGUAGE: 0
NEGVOCALIZATION: 0
NEGVOCALIZATION: 0
CONSOLABILITY: 0
BODYLANGUAGE: 0
TOTALSCORE: 0
TOTALSCORE: 0
FACIALEXPRESSION: 0
NEGVOCALIZATION: 0
TOTALSCORE: 0
CONSOLABILITY: 0
BREATHING: 0
TOTALSCORE: 0
CONSOLABILITY: 0
CONSOLABILITY: 0
NEGVOCALIZATION: 0
TOTALSCORE: 0
FACIALEXPRESSION: 0
NEGVOCALIZATION: 0
BODYLANGUAGE: 0
BODYLANGUAGE: 0
BREATHING: 0
BODYLANGUAGE: 0
CONSOLABILITY: 0
BREATHING: 0
FACIALEXPRESSION: 0
FACIALEXPRESSION: 0
NEGVOCALIZATION: 0
NEGVOCALIZATION: 0
TOTALSCORE: 0
FACIALEXPRESSION: 0
FACIALEXPRESSION: 0
BREATHING: 0
BREATHING: 0

## 2019-08-06 ASSESSMENT — PAIN SCALES - GENERAL
PAINLEVEL_OUTOF10: 0

## 2019-08-06 NOTE — PROGRESS NOTES
Patient anxious, yelling out that he wanted to go home. Reoriented patient. Will continue to monitor.

## 2019-08-06 NOTE — PROGRESS NOTES
Linezolid    Immunizations : There is no immunization history on file for this patient. Social History:     Social History     Tobacco Use    Smoking status: Never Smoker    Smokeless tobacco: Never Used   Substance Use Topics    Alcohol use: Yes     Alcohol/week: 1.0 standard drinks     Types: 1 Cans of beer per week     Comment: history of drinking a bottle of whiskey a day 1can a day    Drug use: No     Social History     Tobacco Use   Smoking Status Never Smoker   Smokeless Tobacco Never Used      Family History   Problem Relation Age of Onset    Emphysema Father           REVIEW OF SYSTEMS:    No fever / chills / sweats. No weight loss. No visual change, eye pain, eye discharge. No oral lesion, sore throat, dysphagia. Denies cough / sputum/Sob   Denies chest pain, palpitations/ dizziness  Denies nausea/ vomiting/abdominal pain/diarrhea. Denies dysuria or change in urinary function. Denies joint swelling or pain. No myalgia, arthralgia. No rashes, skin lesions   Denies focal weakness, sensory change or other neurologic symptoms  No lymph node swelling or tenderness.     Change in mentation+ low platelets encephalopathy+     PHYSICAL EXAM:      Vitals:    BP (!) 151/87   Pulse 60   Temp 98.5 °F (36.9 °C) (Oral)   Resp 16   Ht 5' 6\" (1.676 m)   Wt 218 lb 11.1 oz (99.2 kg)   SpO2 92%   BMI 35.30 kg/m²   General Appearance: more alert+    acute distress, ++ pallor, ++ icterus   Skin: warm and dry, no rash or erythema  Head: normocephalic and atraumatic  Eyes: pupils equal, round, and reactive to light, conjunctivae normal  ENT: tympanic membrane, external ear and ear canal normal bilaterally, nose without deformity, nasal mucosa and turbinates normal without polyps  Neck: supple and non-tender without mass, no thyromegaly  no cervical lymphadenopathy  Pulmonary/Chest: clear to auscultation bilaterally- no wheezes, rales or rhonchi, normal air movement, no respiratory distress  Cardiovascular: normal rate, regular rhythm, normal S1 and S2, no murmurs, rubs, clicks, or gallops, no carotid bruits  Abdomen: soft, ++tender, non-distended, normal bowel sounds, no masses or organomegaly  Extremities: no cyanosis, clubbing or+ edema  Musculoskeletal: normal range of motion, no joint swelling, deformity or tenderness  Neurologic: reflexes normal and symmetric, Tremor+  Lines: IV  Supra pubic catheter+        Data Review:    Lab Results   Component Value Date    WBC 3.7 (L) 08/06/2019    HGB 7.3 (L) 08/06/2019    HCT 21.5 (L) 08/06/2019    .8 (H) 08/06/2019    PLT 23 (L) 08/06/2019     Lab Results   Component Value Date    CREATININE 1.0 08/06/2019    BUN 8 08/06/2019     08/06/2019    K 3.1 (L) 08/06/2019     08/06/2019    CO2 22 08/06/2019       Hepatic Function Panel:   Lab Results   Component Value Date    ALKPHOS 81 08/06/2019    ALT 17 08/06/2019    AST 36 08/06/2019    PROT 5.8 08/06/2019    BILITOT 1.6 08/06/2019    LABALBU 2.6 08/06/2019       UA:  Lab Results   Component Value Date    COLORU YELLOW 08/01/2019    CLARITYU CLOUDY 08/01/2019    GLUCOSEU Negative 08/01/2019    GLUCOSEU NEGATIVE 06/27/2011    BILIRUBINUR Negative 08/01/2019    BILIRUBINUR SMALL 06/27/2011    KETUA Negative 08/01/2019    SPECGRAV 1.015 08/01/2019    BLOODU LARGE 08/01/2019    PHUR 6.0 08/01/2019    PROTEINU 100 08/01/2019    UROBILINOGEN 0.2 08/01/2019    NITRU Negative 08/01/2019    LEUKOCYTESUR SMALL 08/01/2019    LABMICR YES 08/01/2019    URINETYPE Not Specified 08/01/2019      Urine Microscopic:   Lab Results   Component Value Date    LABCAST 20-40 Hyaline 08/01/2019    BACTERIA Rare 08/01/2019    COMU see below 07/20/2019    HYALCAST 2 07/20/2019    WBCUA 6-10 08/01/2019    RBCUA 20-50 08/01/2019    EPIU 2 07/20/2019     CREAT  1.6  DOWN TO  1.2   lACTIC ACID  3.5     Ammonia 45     MICRO: cultures reviewed and updated by me       08/01/19 1926       Order Status: Completed Specimen: Urine, clean catch Updated: 08/03/19 1152    Urine Culture, Routine --    Organism Gram negative rodAbnormal     Urine Culture, Routine --    75,000 CFU/ml   ID and sensitivity to follow    Narrative:     ORDER#: [id number]                          ORDERED BY: LI CUENCA  SOURCE: Urine Clean Catch                  COLLECTED:  08/01/19 19:26  [facility] [place]. :                      RECEIVED :  08/01/19 20:13  Performed at:  [facility]  [address] [facility] [address]   Phone [phone]   Clostridium Difficile Toxin/Antigen [[id number]] Collected: 08/02/19 1600   Order Status: Completed Specimen: Stool Updated: 08/02/19 1651    C.diff Toxin/Antigen --    Negative for Clostridium difficile antigen and toxin   Normal Range: Negative    Narrative:     ORDER#: [id number]                          ORDERED BY: Perry Marie  SOURCE: Stool                              COLLECTED:  08/02/19 16:00  [facility] [place]. :                      RECEIVED :  08/02/19 16:15  Collect White vial (sterile container)  Performed at:  [facility]  [address]   Phone [phone]   Culture Blood #1 [[id number]] Collected: 08/02/19 1421   Order Status: Sent Specimen: Blood Updated: 08/02/19 1446   Culture Blood #2 [[id number]] Collected: 08/02/19 1440   Order Status: Sent Specimen: Blood Updated: 08/02/19 1446            Urine Culture [[id number]] (Abnormal)  Collected: 08/01/19 1926   Order Status: Completed Specimen: Urine, clean catch Updated: 08/05/19 0601    Urine Culture, Routine --    Organism Enterobacter cloacaeAbnormal     Urine Culture, Routine --    >100,000 CFU/ml   Organism tested negative for carbapenemase production. Resistance is due to other mechanisms.     Narrative:     ORDER#: [id number]                          ORDERED BY: LI CUENCA       IMAGING:    CT ABDOMEN PELVIS WO CONTRAST Additional Contrast? None   Final Result   Markedly abnormal dilated cecum. The cecum appears to be folded anteriorly. There is surrounding injection of the mesenteric fat. Appearance is   suggestive of early cecal volvulus-cecal bascule. Reactive fluid is seen in   the mesentery and pelvis      Cholelithiasis         XR CHEST PORTABLE   Final Result   Stable portable study. All the pertinent images and reports for the current Hospitalization were reviewed by me     Scheduled Meds:   potassium chloride  10 mEq Oral Daily    amiodarone  200 mg Oral Daily    DULoxetine  30 mg Oral Daily    folic acid  1 mg Oral Daily    miconazole   Topical BID    nadolol  20 mg Oral Daily    rifaximin  550 mg Oral BID    vitamin B-1  100 mg Oral Daily    magnesium cl-calcium carbonate  1 tablet Oral Daily    lidocaine 1 % injection  5 mL Intradermal Once    sodium chloride flush  10 mL Intravenous 2 times per day       Continuous Infusions:   lactated ringers 100 mL/hr at 08/06/19 1134       PRN Meds:  sodium chloride flush, magnesium hydroxide, ondansetron, potassium chloride **OR** potassium alternative oral replacement **OR** potassium chloride      Assessment:   Change in Mentation  Lactic acidosis better   Cirrhois of LIVER with portal HTN  CVA history   H/o Hepatic encephalopathy  ? Dementia  CT abd/pelvis on this admit ? Distended cecum  Supra pubic catheter  JAIME on this admit  Urine cx from Supra pubic catheters not helpful at all     Suspect change in mentation multifactorial with on going encephalopathy and he has lactic acidosis and this could be from Linezolid use with associated Encephalopathy.  Ammonia on this admit normal and CT abd/pelvis some concern for intra abdominal process.      Some clinical improvement and lactic acid now better suspect this to be from LINEZOLID     D/C Zosyn as Blood cx negative    Urine cx need no treatment as this is colonization of the Supra pubic catheter    Labs,

## 2019-08-06 NOTE — CARE COORDINATION
Discharge Planning:  Discharge Planning:  CATERINA spoke with Florin Fam at [facility]. Florin Fam stated hat pts spouse did not request to hold pts bed but that pts bed will be available for pt is pt wishes to return to this facility upon d/c. Pt was [facility] to the hospital from [facility]. SW contacted pts spouse and left a message. Plan: Pt is from Home at [facility]. This facility is able to accept pt back upon d/c. Message left for spouse to verify d/c plans.   Electronically signed by Jane Gonsalez on 8/6/2019 at 1:50 PM

## 2019-08-06 NOTE — PLAN OF CARE
Problem: Falls - Risk of:  Goal: Will remain free from falls  Description  Will remain free from falls  Outcome: Ongoing  Goal: Absence of physical injury  Description  Absence of physical injury  Outcome: Ongoing     Problem: Risk for Impaired Skin Integrity  Goal: Tissue integrity - skin and mucous membranes  Description  Structural intactness and normal physiological function of skin and  mucous membranes.   Outcome: Ongoing     Problem: Self-Care:  Goal: Ability to participate in self-care as condition permits will improve  Description  Ability to participate in self-care as condition permits will improve  Outcome: Ongoing

## 2019-08-07 LAB
A/G RATIO: 0.8 (ref 1.1–2.2)
ABO/RH: NORMAL
ALBUMIN SERPL-MCNC: 2.5 G/DL (ref 3.4–5)
ALP BLD-CCNC: 76 U/L (ref 40–129)
ALT SERPL-CCNC: 16 U/L (ref 10–40)
AMMONIA: 53 UMOL/L (ref 16–60)
ANION GAP SERPL CALCULATED.3IONS-SCNC: 10 MMOL/L (ref 3–16)
ANISOCYTOSIS: ABNORMAL
ANTIBODY SCREEN: NORMAL
AST SERPL-CCNC: 36 U/L (ref 15–37)
BASOPHILS ABSOLUTE: 0 K/UL (ref 0–0.2)
BASOPHILS RELATIVE PERCENT: 0.8 %
BILIRUB SERPL-MCNC: 1.7 MG/DL (ref 0–1)
BLOOD BANK DISPENSE STATUS: NORMAL
BLOOD BANK PRODUCT CODE: NORMAL
BLOOD CULTURE, ROUTINE: NORMAL
BPU ID: NORMAL
BUN BLDV-MCNC: 8 MG/DL (ref 7–20)
CALCIUM SERPL-MCNC: 8.9 MG/DL (ref 8.3–10.6)
CHLORIDE BLD-SCNC: 108 MMOL/L (ref 99–110)
CO2: 23 MMOL/L (ref 21–32)
CREAT SERPL-MCNC: 0.8 MG/DL (ref 0.8–1.3)
CULTURE, BLOOD 2: NORMAL
DESCRIPTION BLOOD BANK: NORMAL
EOSINOPHILS ABSOLUTE: 0.1 K/UL (ref 0–0.6)
EOSINOPHILS RELATIVE PERCENT: 2.9 %
FERRITIN: 263.6 NG/ML (ref 30–400)
FOLATE: 15.02 NG/ML (ref 4.78–24.2)
GFR AFRICAN AMERICAN: >60
GFR NON-AFRICAN AMERICAN: >60
GLOBULIN: 3.1 G/DL
GLUCOSE BLD-MCNC: 106 MG/DL (ref 70–99)
HAPTOGLOBIN: 25 MG/DL (ref 30–200)
HCT VFR BLD CALC: 20.2 % (ref 40.5–52.5)
HEMATOLOGY PATH CONSULT: NORMAL
HEMATOLOGY PATH CONSULT: YES
HEMOGLOBIN: 6.9 G/DL (ref 13.5–17.5)
IRON SATURATION: ABNORMAL % (ref 20–50)
IRON: 233 UG/DL (ref 59–158)
LACTATE DEHYDROGENASE: 264 U/L (ref 100–190)
LYMPHOCYTES ABSOLUTE: 1.2 K/UL (ref 1–5.1)
LYMPHOCYTES RELATIVE PERCENT: 37.2 %
MACROCYTES: ABNORMAL
MAGNESIUM: 1.6 MG/DL (ref 1.8–2.4)
MCH RBC QN AUTO: 34.2 PG (ref 26–34)
MCHC RBC AUTO-ENTMCNC: 34.2 G/DL (ref 31–36)
MCV RBC AUTO: 100.1 FL (ref 80–100)
MONOCYTES ABSOLUTE: 0.3 K/UL (ref 0–1.3)
MONOCYTES RELATIVE PERCENT: 10 %
NEUTROPHILS ABSOLUTE: 1.6 K/UL (ref 1.7–7.7)
NEUTROPHILS RELATIVE PERCENT: 49.1 %
OCCULT BLOOD SCREENING: ABNORMAL
PDW BLD-RTO: 15.7 % (ref 12.4–15.4)
PLATELET # BLD: 16 K/UL (ref 135–450)
PLATELET SLIDE REVIEW: ABNORMAL
PMV BLD AUTO: 8.2 FL (ref 5–10.5)
POTASSIUM REFLEX MAGNESIUM: 3.4 MMOL/L (ref 3.5–5.1)
RBC # BLD: 2.02 M/UL (ref 4.2–5.9)
SLIDE REVIEW: ABNORMAL
SODIUM BLD-SCNC: 141 MMOL/L (ref 136–145)
TOTAL IRON BINDING CAPACITY: ABNORMAL UG/DL (ref 260–445)
TOTAL PROTEIN: 5.6 G/DL (ref 6.4–8.2)
VITAMIN B-12: 943 PG/ML (ref 211–911)
WBC # BLD: 3.3 K/UL (ref 4–11)

## 2019-08-07 PROCEDURE — 82728 ASSAY OF FERRITIN: CPT

## 2019-08-07 PROCEDURE — 6370000000 HC RX 637 (ALT 250 FOR IP): Performed by: INTERNAL MEDICINE

## 2019-08-07 PROCEDURE — 2580000003 HC RX 258: Performed by: NURSE PRACTITIONER

## 2019-08-07 PROCEDURE — 36415 COLL VENOUS BLD VENIPUNCTURE: CPT

## 2019-08-07 PROCEDURE — 82607 VITAMIN B-12: CPT

## 2019-08-07 PROCEDURE — 86022 PLATELET ANTIBODIES: CPT

## 2019-08-07 PROCEDURE — 83540 ASSAY OF IRON: CPT

## 2019-08-07 PROCEDURE — 82140 ASSAY OF AMMONIA: CPT

## 2019-08-07 PROCEDURE — 84155 ASSAY OF PROTEIN SERUM: CPT

## 2019-08-07 PROCEDURE — 94760 N-INVAS EAR/PLS OXIMETRY 1: CPT

## 2019-08-07 PROCEDURE — 84165 PROTEIN E-PHORESIS SERUM: CPT

## 2019-08-07 PROCEDURE — 86923 COMPATIBILITY TEST ELECTRIC: CPT

## 2019-08-07 PROCEDURE — 6360000002 HC RX W HCPCS: Performed by: INTERNAL MEDICINE

## 2019-08-07 PROCEDURE — G0328 FECAL BLOOD SCRN IMMUNOASSAY: HCPCS

## 2019-08-07 PROCEDURE — P9016 RBC LEUKOCYTES REDUCED: HCPCS

## 2019-08-07 PROCEDURE — 85025 COMPLETE CBC W/AUTO DIFF WBC: CPT

## 2019-08-07 PROCEDURE — 1200000000 HC SEMI PRIVATE

## 2019-08-07 PROCEDURE — 6370000000 HC RX 637 (ALT 250 FOR IP): Performed by: NURSE PRACTITIONER

## 2019-08-07 PROCEDURE — 83615 LACTATE (LD) (LDH) ENZYME: CPT

## 2019-08-07 PROCEDURE — 86900 BLOOD TYPING SEROLOGIC ABO: CPT

## 2019-08-07 PROCEDURE — 86850 RBC ANTIBODY SCREEN: CPT

## 2019-08-07 PROCEDURE — 82746 ASSAY OF FOLIC ACID SERUM: CPT

## 2019-08-07 PROCEDURE — 83010 ASSAY OF HAPTOGLOBIN QUANT: CPT

## 2019-08-07 PROCEDURE — 36430 TRANSFUSION BLD/BLD COMPNT: CPT

## 2019-08-07 PROCEDURE — 2580000003 HC RX 258: Performed by: INTERNAL MEDICINE

## 2019-08-07 PROCEDURE — 83735 ASSAY OF MAGNESIUM: CPT

## 2019-08-07 PROCEDURE — 80053 COMPREHEN METABOLIC PANEL: CPT

## 2019-08-07 PROCEDURE — 83550 IRON BINDING TEST: CPT

## 2019-08-07 PROCEDURE — 86901 BLOOD TYPING SEROLOGIC RH(D): CPT

## 2019-08-07 RX ORDER — LACTULOSE 10 G/15ML
20 SOLUTION ORAL 3 TIMES DAILY
Status: DISCONTINUED | OUTPATIENT
Start: 2019-08-07 | End: 2019-08-19 | Stop reason: HOSPADM

## 2019-08-07 RX ORDER — 0.9 % SODIUM CHLORIDE 0.9 %
250 INTRAVENOUS SOLUTION INTRAVENOUS ONCE
Status: COMPLETED | OUTPATIENT
Start: 2019-08-07 | End: 2019-08-08

## 2019-08-07 RX ORDER — MAGNESIUM SULFATE IN WATER 40 MG/ML
2 INJECTION, SOLUTION INTRAVENOUS ONCE
Status: COMPLETED | OUTPATIENT
Start: 2019-08-07 | End: 2019-08-07

## 2019-08-07 RX ADMIN — LACTULOSE 20 G: 20 SOLUTION ORAL at 14:13

## 2019-08-07 RX ADMIN — MICONAZOLE NITRATE: 20 POWDER TOPICAL at 11:00

## 2019-08-07 RX ADMIN — SODIUM CHLORIDE, PRESERVATIVE FREE 10 ML: 5 INJECTION INTRAVENOUS at 11:05

## 2019-08-07 RX ADMIN — RIFAXIMIN 550 MG: 550 TABLET ORAL at 11:00

## 2019-08-07 RX ADMIN — MAGNESIUM SULFATE HEPTAHYDRATE 2 G: 40 INJECTION, SOLUTION INTRAVENOUS at 11:06

## 2019-08-07 RX ADMIN — MICONAZOLE NITRATE: 20 POWDER TOPICAL at 21:01

## 2019-08-07 RX ADMIN — POTASSIUM CHLORIDE 10 MEQ: 750 TABLET, FILM COATED, EXTENDED RELEASE ORAL at 11:00

## 2019-08-07 RX ADMIN — SODIUM CHLORIDE, POTASSIUM CHLORIDE, SODIUM LACTATE AND CALCIUM CHLORIDE: 600; 310; 30; 20 INJECTION, SOLUTION INTRAVENOUS at 13:14

## 2019-08-07 RX ADMIN — Medication 100 MG: at 11:00

## 2019-08-07 RX ADMIN — FOLIC ACID 1 MG: 1 TABLET ORAL at 11:00

## 2019-08-07 RX ADMIN — SODIUM CHLORIDE 250 ML: 9 INJECTION, SOLUTION INTRAVENOUS at 14:13

## 2019-08-07 RX ADMIN — AMIODARONE HYDROCHLORIDE 200 MG: 200 TABLET ORAL at 11:00

## 2019-08-07 RX ADMIN — DULOXETINE HYDROCHLORIDE 30 MG: 30 CAPSULE, DELAYED RELEASE ORAL at 11:00

## 2019-08-07 RX ADMIN — TBO-FILGRASTIM 480 MCG: 480 INJECTION, SOLUTION SUBCUTANEOUS at 14:13

## 2019-08-07 RX ADMIN — Medication 1 TABLET: at 11:00

## 2019-08-07 RX ADMIN — NADOLOL 20 MG: 20 TABLET ORAL at 11:00

## 2019-08-07 RX ADMIN — LACTULOSE 20 G: 20 SOLUTION ORAL at 21:01

## 2019-08-07 RX ADMIN — DARBEPOETIN ALFA 150 MCG: 150 INJECTION, SOLUTION INTRAVENOUS; SUBCUTANEOUS at 14:13

## 2019-08-07 RX ADMIN — POTASSIUM CHLORIDE 40 MEQ: 20 TABLET, EXTENDED RELEASE ORAL at 11:00

## 2019-08-07 ASSESSMENT — PAIN SCALES - PAIN ASSESSMENT IN ADVANCED DEMENTIA (PAINAD)
TOTALSCORE: 0
BODYLANGUAGE: 0
NEGVOCALIZATION: 0
BREATHING: 0
FACIALEXPRESSION: 0
CONSOLABILITY: 0

## 2019-08-07 ASSESSMENT — PAIN SCALES - GENERAL: PAINLEVEL_OUTOF10: 0

## 2019-08-07 NOTE — PROGRESS NOTES
ischemia or volvulus, advancing diet. 2. Thought to have UTI, likely colonization, on diflucan, received zyvox, h/o VRE, but currently with thrombocytopenia, we discontinued zyvox and continue to monitor for signs and symptoms of UTI. 3. JAIME, iv fluids for now  4. Elevated LA, WNL today. 5. Severe hypokalemia, still replacing per protocol. Discussed with nurse. 6. Alcoholic cirrhosis, monitor for now  7. Pancytopenia, likely liver disease related, keeps worsening, no signs of active bleeding. Will hold lovenox. Still dropping. Hematology consulted. Started on Granix and Aranesp and workup underway. Ordered 1U PRBC. 8. Acute encephalopathy, metabolic ,multofactorial, monitor for now, treatment as above. Appears drowsy today. Ammonia level WNL. Continue lactulose and Rifaximin. 9. Generalized weakness, as above.   10. Essential hypertension, we will start po meds, was hypotensive on presentation.        DVT Prophylaxis: SCD  Diet: DIET GENERAL;  Dietary Nutrition Supplements: Standard High Calorie Oral Supplement  Code Status: Full Code        Dispo - Ongoing management     Minesh Chinchilla MD

## 2019-08-08 LAB
A/G RATIO: 0.8 (ref 1.1–2.2)
ALBUMIN SERPL-MCNC: 2.5 G/DL (ref 3.4–5)
ALP BLD-CCNC: 87 U/L (ref 40–129)
ALT SERPL-CCNC: 14 U/L (ref 10–40)
AMMONIA: 42 UMOL/L (ref 16–60)
ANION GAP SERPL CALCULATED.3IONS-SCNC: 8 MMOL/L (ref 3–16)
AST SERPL-CCNC: 32 U/L (ref 15–37)
BASOPHILS ABSOLUTE: 0 K/UL (ref 0–0.2)
BASOPHILS RELATIVE PERCENT: 0.4 %
BILIRUB SERPL-MCNC: 1.7 MG/DL (ref 0–1)
BLOOD BANK DISPENSE STATUS: NORMAL
BLOOD BANK PRODUCT CODE: NORMAL
BPU ID: NORMAL
BUN BLDV-MCNC: 6 MG/DL (ref 7–20)
CALCIUM SERPL-MCNC: 8.9 MG/DL (ref 8.3–10.6)
CHLORIDE BLD-SCNC: 108 MMOL/L (ref 99–110)
CO2: 27 MMOL/L (ref 21–32)
CREAT SERPL-MCNC: 0.7 MG/DL (ref 0.8–1.3)
DESCRIPTION BLOOD BANK: NORMAL
EOSINOPHILS ABSOLUTE: 0.1 K/UL (ref 0–0.6)
EOSINOPHILS RELATIVE PERCENT: 1.1 %
GFR AFRICAN AMERICAN: >60
GFR NON-AFRICAN AMERICAN: >60
GLOBULIN: 3 G/DL
GLUCOSE BLD-MCNC: 107 MG/DL (ref 70–99)
HCT VFR BLD CALC: 21.8 % (ref 40.5–52.5)
HEMATOLOGY PATH CONSULT: NO
HEMOGLOBIN: 7.5 G/DL (ref 13.5–17.5)
HEPARIN INDUCED PLATELET ANTIBODY: NEGATIVE
LYMPHOCYTES ABSOLUTE: 1.3 K/UL (ref 1–5.1)
LYMPHOCYTES RELATIVE PERCENT: 11.1 %
MAGNESIUM: 1.8 MG/DL (ref 1.8–2.4)
MCH RBC QN AUTO: 33.8 PG (ref 26–34)
MCHC RBC AUTO-ENTMCNC: 34.2 G/DL (ref 31–36)
MCV RBC AUTO: 98.8 FL (ref 80–100)
MONOCYTES ABSOLUTE: 0.8 K/UL (ref 0–1.3)
MONOCYTES RELATIVE PERCENT: 6.8 %
NEUTROPHILS ABSOLUTE: 9.2 K/UL (ref 1.7–7.7)
NEUTROPHILS RELATIVE PERCENT: 80.6 %
PDW BLD-RTO: 15.3 % (ref 12.4–15.4)
PLATELET # BLD: 10 K/UL (ref 135–450)
PMV BLD AUTO: 8.2 FL (ref 5–10.5)
POTASSIUM REFLEX MAGNESIUM: 3.2 MMOL/L (ref 3.5–5.1)
RBC # BLD: 2.21 M/UL (ref 4.2–5.9)
SODIUM BLD-SCNC: 143 MMOL/L (ref 136–145)
TOTAL PROTEIN: 5.5 G/DL (ref 6.4–8.2)
WBC # BLD: 11.4 K/UL (ref 4–11)

## 2019-08-08 PROCEDURE — 2580000003 HC RX 258: Performed by: NURSE PRACTITIONER

## 2019-08-08 PROCEDURE — 36415 COLL VENOUS BLD VENIPUNCTURE: CPT

## 2019-08-08 PROCEDURE — 94761 N-INVAS EAR/PLS OXIMETRY MLT: CPT

## 2019-08-08 PROCEDURE — 85025 COMPLETE CBC W/AUTO DIFF WBC: CPT

## 2019-08-08 PROCEDURE — 83735 ASSAY OF MAGNESIUM: CPT

## 2019-08-08 PROCEDURE — 2580000003 HC RX 258: Performed by: INTERNAL MEDICINE

## 2019-08-08 PROCEDURE — 6370000000 HC RX 637 (ALT 250 FOR IP): Performed by: INTERNAL MEDICINE

## 2019-08-08 PROCEDURE — 1200000000 HC SEMI PRIVATE

## 2019-08-08 PROCEDURE — 82140 ASSAY OF AMMONIA: CPT

## 2019-08-08 PROCEDURE — 80053 COMPREHEN METABOLIC PANEL: CPT

## 2019-08-08 PROCEDURE — 2700000000 HC OXYGEN THERAPY PER DAY

## 2019-08-08 PROCEDURE — P9035 PLATELET PHERES LEUKOREDUCED: HCPCS

## 2019-08-08 RX ORDER — 0.9 % SODIUM CHLORIDE 0.9 %
250 INTRAVENOUS SOLUTION INTRAVENOUS ONCE
Status: COMPLETED | OUTPATIENT
Start: 2019-08-08 | End: 2019-08-08

## 2019-08-08 RX ORDER — QUETIAPINE FUMARATE 25 MG/1
12.5 TABLET, FILM COATED ORAL NIGHTLY
Status: DISCONTINUED | OUTPATIENT
Start: 2019-08-08 | End: 2019-08-19 | Stop reason: HOSPADM

## 2019-08-08 RX ADMIN — SODIUM CHLORIDE 250 ML: 9 INJECTION, SOLUTION INTRAVENOUS at 10:00

## 2019-08-08 RX ADMIN — DULOXETINE HYDROCHLORIDE 30 MG: 30 CAPSULE, DELAYED RELEASE ORAL at 09:46

## 2019-08-08 RX ADMIN — SODIUM CHLORIDE, POTASSIUM CHLORIDE, SODIUM LACTATE AND CALCIUM CHLORIDE: 600; 310; 30; 20 INJECTION, SOLUTION INTRAVENOUS at 02:56

## 2019-08-08 RX ADMIN — MICONAZOLE NITRATE: 20 POWDER TOPICAL at 09:45

## 2019-08-08 RX ADMIN — FOLIC ACID 1 MG: 1 TABLET ORAL at 09:46

## 2019-08-08 RX ADMIN — LACTULOSE 20 G: 20 SOLUTION ORAL at 09:46

## 2019-08-08 RX ADMIN — QUETIAPINE FUMARATE 12.5 MG: 25 TABLET ORAL at 22:09

## 2019-08-08 RX ADMIN — AMIODARONE HYDROCHLORIDE 200 MG: 200 TABLET ORAL at 09:46

## 2019-08-08 RX ADMIN — NADOLOL 20 MG: 20 TABLET ORAL at 09:45

## 2019-08-08 RX ADMIN — SODIUM CHLORIDE, PRESERVATIVE FREE 10 ML: 5 INJECTION INTRAVENOUS at 22:10

## 2019-08-08 RX ADMIN — POTASSIUM CHLORIDE 10 MEQ: 750 TABLET, FILM COATED, EXTENDED RELEASE ORAL at 09:46

## 2019-08-08 RX ADMIN — Medication 1 TABLET: at 09:45

## 2019-08-08 RX ADMIN — Medication 100 MG: at 09:59

## 2019-08-08 RX ADMIN — RIFAXIMIN 550 MG: 550 TABLET ORAL at 09:46

## 2019-08-08 RX ADMIN — RIFAXIMIN 550 MG: 550 TABLET ORAL at 22:09

## 2019-08-08 RX ADMIN — MICONAZOLE NITRATE: 20 POWDER TOPICAL at 22:10

## 2019-08-08 ASSESSMENT — PAIN SCALES - PAIN ASSESSMENT IN ADVANCED DEMENTIA (PAINAD)
NEGVOCALIZATION: 0
BREATHING: 0
BODYLANGUAGE: 0
TOTALSCORE: 0
BODYLANGUAGE: 0
CONSOLABILITY: 0
TOTALSCORE: 0
TOTALSCORE: 0
NEGVOCALIZATION: 0
TOTALSCORE: 0
FACIALEXPRESSION: 0
CONSOLABILITY: 0
CONSOLABILITY: 0
FACIALEXPRESSION: 0
NEGVOCALIZATION: 0
BODYLANGUAGE: 0
CONSOLABILITY: 0
FACIALEXPRESSION: 0
FACIALEXPRESSION: 0
BODYLANGUAGE: 0
BREATHING: 0
NEGVOCALIZATION: 0
BODYLANGUAGE: 0
TOTALSCORE: 0
CONSOLABILITY: 0
BREATHING: 0
NEGVOCALIZATION: 0
FACIALEXPRESSION: 0

## 2019-08-08 ASSESSMENT — PAIN SCALES - GENERAL
PAINLEVEL_OUTOF10: 0

## 2019-08-08 NOTE — PROGRESS NOTES
color, texture, turgor normal, no rashes or lesions   Lymph nodes:   Cervical, supraclavicular, and axillary nodes normal   Neurologic:   Stable          Data Review  CBC:   Lab Results   Component Value Date    WBC 11.4 08/08/2019    RBC 2.21 08/08/2019       Assessment:     Principal Problem:    Abdominal pain  Active Problems:    Acute metabolic encephalopathy    Alcoholic cirrhosis (HCC)    Dementia    Thrombocytopenia (HCC)    Hypokalemia    Urinary tract infection associated with cystostomy catheter (HCC)    Generalized weakness    JAIME (acute kidney injury) ([address]    Elevated lactic acid level    Cecal volvulus (HCC)    Altered mental status  Resolved Problems:    * No resolved hospital problems. *      Plan:     1. Pancytopenia. This is chronic and due to hypersplenism. The acute drop in his white blood cell count and platelet count from his baseline could be due to bone marrow suppression from his recent infection as well as the antibiotics (Zosyn and Linezolid). I will go ahead and transfuse platelets today. His anemia is also a partially due to GI bleeding. He is guaiac positive. I will defer to GI as to how aggressive in terms of a GI work-up. His haptoglobin is low but the haptoglobin is often low in liver disease and is not very helpful. In terms of his thrombocytopenia, his HIT study is pending. His smear was reviewed by pathology and there were no schistocytes.         Electronically signed by Matthew Delgado MD on 8/8/2019 at 12:11 PM

## 2019-08-08 NOTE — PLAN OF CARE
Problem: Falls - Risk of:  Goal: Will remain free from falls  Description  Will remain free from falls  Outcome: Ongoing  Fall risk precautions in place. Bed in lowest position with wheels locked,bed alarm in place and activated, non-skid socks on pt, fall risk ID on pt, call light in reach, pt encouraged to call before getting out of bed and for any other needs or complaints. Problem: Risk for Impaired Skin Integrity  Goal: Tissue integrity - skin and mucous membranes  Description  Structural intactness and normal physiological function of skin and  mucous membranes. Outcome: Ongoing  Skin assessment completed every shift. Pt assessed for incontinence, appropriate barrier cream applied prn. Pt encouraged to turn/rotate every 2 hours. Assistance provided if pt unable to do so themselves.
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
## 2019-08-09 LAB
A/G RATIO: 0.9 (ref 1.1–2.2)
ALBUMIN SERPL-MCNC: 2.6 G/DL (ref 3.4–5)
ALP BLD-CCNC: 92 U/L (ref 40–129)
ALT SERPL-CCNC: 14 U/L (ref 10–40)
AMMONIA: 44 UMOL/L (ref 16–60)
ANION GAP SERPL CALCULATED.3IONS-SCNC: 8 MMOL/L (ref 3–16)
AST SERPL-CCNC: 32 U/L (ref 15–37)
BASOPHILS ABSOLUTE: 0 K/UL (ref 0–0.2)
BASOPHILS RELATIVE PERCENT: 0.2 %
BILIRUB SERPL-MCNC: 1.6 MG/DL (ref 0–1)
BUN BLDV-MCNC: 5 MG/DL (ref 7–20)
CALCIUM SERPL-MCNC: 9.4 MG/DL (ref 8.3–10.6)
CHLORIDE BLD-SCNC: 106 MMOL/L (ref 99–110)
CO2: 28 MMOL/L (ref 21–32)
CREAT SERPL-MCNC: 0.7 MG/DL (ref 0.8–1.3)
EOSINOPHILS ABSOLUTE: 0.1 K/UL (ref 0–0.6)
EOSINOPHILS RELATIVE PERCENT: 1.1 %
GFR AFRICAN AMERICAN: >60
GFR NON-AFRICAN AMERICAN: >60
GLOBULIN: 2.8 G/DL
GLUCOSE BLD-MCNC: 109 MG/DL (ref 70–99)
HCT VFR BLD CALC: 21 % (ref 40.5–52.5)
HEMOGLOBIN: 7.1 G/DL (ref 13.5–17.5)
LYMPHOCYTES ABSOLUTE: 1.4 K/UL (ref 1–5.1)
LYMPHOCYTES RELATIVE PERCENT: 10.7 %
MAGNESIUM: 1.7 MG/DL (ref 1.8–2.4)
MCH RBC QN AUTO: 33.6 PG (ref 26–34)
MCHC RBC AUTO-ENTMCNC: 33.7 G/DL (ref 31–36)
MCV RBC AUTO: 99.8 FL (ref 80–100)
MONOCYTES ABSOLUTE: 1 K/UL (ref 0–1.3)
MONOCYTES RELATIVE PERCENT: 7.9 %
NEUTROPHILS ABSOLUTE: 10.3 K/UL (ref 1.7–7.7)
NEUTROPHILS RELATIVE PERCENT: 80.1 %
PDW BLD-RTO: 14.9 % (ref 12.4–15.4)
PLATELET # BLD: 32 K/UL (ref 135–450)
PLATELET SLIDE REVIEW: ABNORMAL
PMV BLD AUTO: 7.6 FL (ref 5–10.5)
POTASSIUM REFLEX MAGNESIUM: 3.4 MMOL/L (ref 3.5–5.1)
RBC # BLD: 2.11 M/UL (ref 4.2–5.9)
RBC # BLD: NORMAL 10*6/UL
SODIUM BLD-SCNC: 142 MMOL/L (ref 136–145)
TOTAL PROTEIN: 5.4 G/DL (ref 6.4–8.2)
WBC # BLD: 12.9 K/UL (ref 4–11)

## 2019-08-09 PROCEDURE — 6360000002 HC RX W HCPCS: Performed by: INTERNAL MEDICINE

## 2019-08-09 PROCEDURE — 1200000000 HC SEMI PRIVATE

## 2019-08-09 PROCEDURE — 82140 ASSAY OF AMMONIA: CPT

## 2019-08-09 PROCEDURE — 6370000000 HC RX 637 (ALT 250 FOR IP): Performed by: INTERNAL MEDICINE

## 2019-08-09 PROCEDURE — 83735 ASSAY OF MAGNESIUM: CPT

## 2019-08-09 PROCEDURE — 2580000003 HC RX 258: Performed by: NURSE PRACTITIONER

## 2019-08-09 PROCEDURE — 80053 COMPREHEN METABOLIC PANEL: CPT

## 2019-08-09 PROCEDURE — 36415 COLL VENOUS BLD VENIPUNCTURE: CPT

## 2019-08-09 PROCEDURE — 6370000000 HC RX 637 (ALT 250 FOR IP): Performed by: NURSE PRACTITIONER

## 2019-08-09 PROCEDURE — 85025 COMPLETE CBC W/AUTO DIFF WBC: CPT

## 2019-08-09 RX ORDER — MAGNESIUM SULFATE 1 G/100ML
1 INJECTION INTRAVENOUS ONCE
Status: COMPLETED | OUTPATIENT
Start: 2019-08-09 | End: 2019-08-09

## 2019-08-09 RX ADMIN — MICONAZOLE NITRATE: 20 POWDER TOPICAL at 21:28

## 2019-08-09 RX ADMIN — POTASSIUM CHLORIDE 40 MEQ: 20 TABLET, EXTENDED RELEASE ORAL at 10:14

## 2019-08-09 RX ADMIN — Medication 100 MG: at 10:14

## 2019-08-09 RX ADMIN — Medication 1 TABLET: at 10:14

## 2019-08-09 RX ADMIN — SODIUM CHLORIDE, POTASSIUM CHLORIDE, SODIUM LACTATE AND CALCIUM CHLORIDE: 600; 310; 30; 20 INJECTION, SOLUTION INTRAVENOUS at 22:35

## 2019-08-09 RX ADMIN — SODIUM CHLORIDE, POTASSIUM CHLORIDE, SODIUM LACTATE AND CALCIUM CHLORIDE: 600; 310; 30; 20 INJECTION, SOLUTION INTRAVENOUS at 13:19

## 2019-08-09 RX ADMIN — QUETIAPINE FUMARATE 25 MG: 25 TABLET ORAL at 20:57

## 2019-08-09 RX ADMIN — SODIUM CHLORIDE, POTASSIUM CHLORIDE, SODIUM LACTATE AND CALCIUM CHLORIDE: 600; 310; 30; 20 INJECTION, SOLUTION INTRAVENOUS at 02:07

## 2019-08-09 RX ADMIN — MICONAZOLE NITRATE: 20 POWDER TOPICAL at 10:15

## 2019-08-09 RX ADMIN — LACTULOSE 20 G: 20 SOLUTION ORAL at 21:28

## 2019-08-09 RX ADMIN — DULOXETINE HYDROCHLORIDE 30 MG: 30 CAPSULE, DELAYED RELEASE ORAL at 10:13

## 2019-08-09 RX ADMIN — MAGNESIUM SULFATE HEPTAHYDRATE 1 G: 1 INJECTION, SOLUTION INTRAVENOUS at 10:27

## 2019-08-09 RX ADMIN — LACTULOSE 20 G: 20 SOLUTION ORAL at 10:14

## 2019-08-09 RX ADMIN — RIFAXIMIN 550 MG: 550 TABLET ORAL at 10:14

## 2019-08-09 RX ADMIN — AMIODARONE HYDROCHLORIDE 200 MG: 200 TABLET ORAL at 10:14

## 2019-08-09 RX ADMIN — FOLIC ACID 1 MG: 1 TABLET ORAL at 10:14

## 2019-08-09 RX ADMIN — SODIUM CHLORIDE, PRESERVATIVE FREE 10 ML: 5 INJECTION INTRAVENOUS at 10:24

## 2019-08-09 RX ADMIN — RIFAXIMIN 550 MG: 550 TABLET ORAL at 20:57

## 2019-08-09 RX ADMIN — LACTULOSE 20 G: 20 SOLUTION ORAL at 15:10

## 2019-08-09 RX ADMIN — NADOLOL 20 MG: 20 TABLET ORAL at 10:14

## 2019-08-09 ASSESSMENT — PAIN SCALES - GENERAL
PAINLEVEL_OUTOF10: 0
PAINLEVEL_OUTOF10: 0

## 2019-08-09 ASSESSMENT — PAIN SCALES - WONG BAKER: WONGBAKER_NUMERICALRESPONSE: 0

## 2019-08-09 NOTE — PROGRESS NOTES
21.8* 21.0*   PLT 16* 10* 32*     Recent Labs     08/07/19  0527 08/08/19  0534 08/09/19  0624    143 142   K 3.4* 3.2* 3.4*    108 106   CO2 23 27 28   BUN 8 6* 5*   CREATININE 0.8 0.7* 0.7*   CALCIUM 8.9 8.9 9.4     Recent Labs     08/07/19  0527 08/08/19  0534 08/09/19  0624   AST 36 32 32   ALT 16 14 14   BILITOT 1.7* 1.7* 1.6*   ALKPHOS 76 87 92     No results for input(s): INR in the last 72 hours. No results for input(s): Pallavi Limber in the last 72 hours. Urinalysis:      Lab Results   Component Value Date    NITRU Negative 08/01/2019    WBCUA 6-10 08/01/2019    BACTERIA Rare 08/01/2019    RBCUA 20-50 08/01/2019    BLOODU LARGE 08/01/2019    SPECGRAV 1.015 08/01/2019    GLUCOSEU Negative 08/01/2019    GLUCOSEU NEGATIVE 06/27/2011       Radiology:  CT ABDOMEN PELVIS WO CONTRAST Additional Contrast? None   Final Result   Markedly abnormal dilated cecum. The cecum appears to be folded anteriorly. There is surrounding injection of the mesenteric fat. Appearance is   suggestive of early cecal volvulus-cecal bascule. Reactive fluid is seen in   the mesentery and pelvis      Cholelithiasis         XR CHEST PORTABLE   Final Result   Stable portable study. Assessment/Plan:    Active Hospital Problems    Diagnosis    Cecal volvulus ([address] [K56.2]    Altered mental status [R41.82]    Elevated lactic acid level [R79.89]    Abdominal pain [R10.9]    JAIME (acute kidney injury) ([address] [N17.9]    Hypokalemia [E87.6]    Urinary tract infection associated with cystostomy catheter ([address] [T83.510A, N39.0]    Thrombocytopenia (HCC) [D69.6]    Generalized weakness [R53.1]    Acute metabolic encephalopathy [Q34.47]    Alcoholic cirrhosis ([address] [T47.36]    Dementia [F03.90]     1. Ileus, CT abdomen and pelvis without contrast showed a markedly abnormal dilated cecum appearing to be folded anteriorly. GS consulted, GI consulted, colonoscopy without ischemia or volvulus, advancing

## 2019-08-09 NOTE — CARE COORDINATION
Discharge Planning:  SW spoke with pts spouse to discuss d/c plans. Spouse stated that she is agreeable for this pt to return to Home at [facility] upon d/c.    CATERINA spoke with Jaleel Moreno at [facility] at [facility] who confirmed that this facility is able to accept this pt upon d/c.  FRENCH updated.   Electronically signed by Kylee Sow on 8/9/2019 at 4:20 PM

## 2019-08-09 NOTE — PROGRESS NOTES
reasonably achievable. COMPARISON: None. HISTORY: ORDERING SYSTEM PROVIDED HISTORY: ABDOMINAL PAIN TECHNOLOGIST PROVIDED HISTORY: Additional Contrast?->None Reason for Exam: abdominal pain Acuity: Acute Type of Exam: Initial Relevant Medical/Surgical History: hx cirrhosis, basal cell carcinoma, gerd FINDINGS: Lower Chest: Bandlike opacities are seen at the lung bases. Trace pleural fluid seen on the left. Organs: No splenomegaly. Adrenal glands appear normal.  No stones or hydronephrosis on the right. No stones or hydronephrosis on the left. No peripancreatic inflammatory change Liver is shrunken and nodular in its contour. Multiple gallstones are seen. GI/Bowel: There is marked abnormal distention of the cecum, measuring up to 12 cm. Cecum is rotated somewhat anteriorly. There is mild injection of the surrounding mesenteric fat with trace mesenteric fluid seen. .  No significant small bowel distention. Muriel Hedges of the colon is also diffusely dilated. Pelvis: Small amount of free fluid seen within the pelvis. Suprapubic catheter seen. Bladder wall is thickened. Peritoneum/Retroperitoneum: No retroperitoneal adenopathy. No aortic aneurysm Bones/Soft Tissues: Degenerative changes are seen in the spine. Degenerative changes are seen in the hips. Markedly abnormal dilated cecum. The cecum appears to be folded anteriorly. There is surrounding injection of the mesenteric fat. Appearance is suggestive of early cecal volvulus-cecal bascule. Reactive fluid is seen in the mesentery and pelvis Cholelithiasis     Ct Head Wo Contrast    Result Date: 7/18/2019  EXAMINATION: CT OF THE HEAD WITHOUT CONTRAST  7/18/2019 9:47 am TECHNIQUE: CT of the head was performed without the administration of intravenous contrast. Dose modulation, iterative reconstruction, and/or weight based adjustment of the mA/kV was utilized to reduce the radiation dose to as low as reasonably achievable.  COMPARISON: 07/17/2019 HISTORY: ORDERING SYSTEM PROVIDED HISTORY: possible hemorrhage on CT scan TECHNOLOGIST PROVIDED HISTORY: Has a \"code stroke\" or \"stroke alert\" been called? ->No Reason for Exam: possible hemorrhage on CT scan Acuity: Acute Type of Exam: Initial FINDINGS: BRAIN/VENTRICLES: Mixed density left sided subdural fluid collection is redemonstrated, measuring up to approximately 10 mm in thickness, similar in size compared to most recent study. The fluid collection is hypodense with a few intervening areas of hyperdensity. There is subtle midline shift to the right by approximately 3 mm, similar to prior. There is mild periventricular hypodensity seen. A few additional areas of hypodensity are seen throughout the frontal and parietal white matter. There is intracranial atherosclerosis. No new areas of hemorrhage ORBITS: The visualized portion of the orbits demonstrate no acute abnormality. SINUSES: Polypoid mucosal thickening is seen in the left maxillary sinus. Trace fluid is seen in the right mastoid air cells. Cerumen is seen on the left. SOFT TISSUES/SKULL:  No acute abnormality of the visualized skull or soft tissues. No change in subdural hematoma on the left. Majority of the fluid collection is hypodense suggesting that it is remote in time course. Intervening areas of hyperdensity suggest superimposed subacute hemorrhage Atrophy with periventricular and scattered frontal parietal white matter disease, likely due to small-vessel ischemic change     Ct Head Wo Contrast    Result Date: 7/17/2019  EXAMINATION: CT OF THE HEAD WITHOUT CONTRAST  7/17/2019 2:15 am TECHNIQUE: CT of the head was performed without the administration of intravenous contrast. Dose modulation, iterative reconstruction, and/or weight based adjustment of the mA/kV was utilized to reduce the radiation dose to as low as reasonably achievable.  COMPARISON: 05/24/2019 HISTORY: ORDERING SYSTEM PROVIDED HISTORY: [facility] TECHNOLOGIST PROVIDED HISTORY: Has a Alcoholic cirrhosis (HCC)    Dementia    Thrombocytopenia (HCC)    Hypokalemia    Hypomagnesemia    Hyperbilirubinemia    Urinary tract infection associated with cystostomy catheter (HCC)    Generalized weakness    Paroxysmal atrial fibrillation (HCC)    Abdominal pain    JAIME [facility]acute [facility]) [facility]    Elevated lactic acid level    Cecal volvulus (HCC)    Altered mental status       ASSESSMENT AND PLAN    . Pancytopenia. This is chronic and due to hypersplenism. The acute drop in his white blood cell count and platelet count from his baseline could be due to bone marrow suppression from his recent infection as well as the antibiotics (Zosyn and Linezolid).   I did not order plts today as he was above 15 after tranfsusion   tranfuse red cells if under 7   moniter ongoing no new recs otherwise   Mervin Nixon MD

## 2019-08-10 ENCOUNTER — APPOINTMENT (OUTPATIENT)
Dept: CT IMAGING | Age: 69
DRG: 388 | End: 2019-08-10
Payer: MEDICARE

## 2019-08-10 ENCOUNTER — APPOINTMENT (OUTPATIENT)
Dept: GENERAL RADIOLOGY | Age: 69
DRG: 388 | End: 2019-08-10
Payer: MEDICARE

## 2019-08-10 LAB
A/G RATIO: 0.8 (ref 1.1–2.2)
ALBUMIN SERPL-MCNC: 2.4 G/DL (ref 3.4–5)
ALP BLD-CCNC: 98 U/L (ref 40–129)
ALT SERPL-CCNC: 14 U/L (ref 10–40)
AMMONIA: 67 UMOL/L (ref 16–60)
ANION GAP SERPL CALCULATED.3IONS-SCNC: 9 MMOL/L (ref 3–16)
AST SERPL-CCNC: 31 U/L (ref 15–37)
BASOPHILS ABSOLUTE: 0 K/UL (ref 0–0.2)
BASOPHILS RELATIVE PERCENT: 0.2 %
BILIRUB SERPL-MCNC: 1.8 MG/DL (ref 0–1)
BUN BLDV-MCNC: 4 MG/DL (ref 7–20)
CALCIUM SERPL-MCNC: 9.3 MG/DL (ref 8.3–10.6)
CHLORIDE BLD-SCNC: 107 MMOL/L (ref 99–110)
CO2: 24 MMOL/L (ref 21–32)
CREAT SERPL-MCNC: 0.7 MG/DL (ref 0.8–1.3)
EOSINOPHILS ABSOLUTE: 0.1 K/UL (ref 0–0.6)
EOSINOPHILS RELATIVE PERCENT: 0.7 %
GFR AFRICAN AMERICAN: >60
GFR NON-AFRICAN AMERICAN: >60
GLOBULIN: 3.1 G/DL
GLUCOSE BLD-MCNC: 109 MG/DL (ref 70–99)
HCT VFR BLD CALC: 22.1 % (ref 40.5–52.5)
HEMOGLOBIN: 7.4 G/DL (ref 13.5–17.5)
LYMPHOCYTES ABSOLUTE: 1.7 K/UL (ref 1–5.1)
LYMPHOCYTES RELATIVE PERCENT: 9.9 %
MAGNESIUM: 1.7 MG/DL (ref 1.8–2.4)
MCH RBC QN AUTO: 33.4 PG (ref 26–34)
MCHC RBC AUTO-ENTMCNC: 33.7 G/DL (ref 31–36)
MCV RBC AUTO: 99.2 FL (ref 80–100)
MONOCYTES ABSOLUTE: 1.4 K/UL (ref 0–1.3)
MONOCYTES RELATIVE PERCENT: 8.3 %
NEUTROPHILS ABSOLUTE: 13.6 K/UL (ref 1.7–7.7)
NEUTROPHILS RELATIVE PERCENT: 80.9 %
PDW BLD-RTO: 15.1 % (ref 12.4–15.4)
PLATELET # BLD: 42 K/UL (ref 135–450)
PMV BLD AUTO: 8.3 FL (ref 5–10.5)
POTASSIUM REFLEX MAGNESIUM: 3 MMOL/L (ref 3.5–5.1)
RBC # BLD: 2.23 M/UL (ref 4.2–5.9)
SODIUM BLD-SCNC: 140 MMOL/L (ref 136–145)
TOTAL PROTEIN: 5.5 G/DL (ref 6.4–8.2)
WBC # BLD: 16.8 K/UL (ref 4–11)

## 2019-08-10 PROCEDURE — 6370000000 HC RX 637 (ALT 250 FOR IP): Performed by: INTERNAL MEDICINE

## 2019-08-10 PROCEDURE — 74018 RADEX ABDOMEN 1 VIEW: CPT

## 2019-08-10 PROCEDURE — 74177 CT ABD & PELVIS W/CONTRAST: CPT

## 2019-08-10 PROCEDURE — 94760 N-INVAS EAR/PLS OXIMETRY 1: CPT

## 2019-08-10 PROCEDURE — 1200000000 HC SEMI PRIVATE

## 2019-08-10 PROCEDURE — 85025 COMPLETE CBC W/AUTO DIFF WBC: CPT

## 2019-08-10 PROCEDURE — 36415 COLL VENOUS BLD VENIPUNCTURE: CPT

## 2019-08-10 PROCEDURE — 82140 ASSAY OF AMMONIA: CPT

## 2019-08-10 PROCEDURE — 6360000002 HC RX W HCPCS: Performed by: INTERNAL MEDICINE

## 2019-08-10 PROCEDURE — 2580000003 HC RX 258: Performed by: NURSE PRACTITIONER

## 2019-08-10 PROCEDURE — 80053 COMPREHEN METABOLIC PANEL: CPT

## 2019-08-10 PROCEDURE — 6360000004 HC RX CONTRAST MEDICATION

## 2019-08-10 PROCEDURE — 83735 ASSAY OF MAGNESIUM: CPT

## 2019-08-10 RX ORDER — MAGNESIUM SULFATE 1 G/100ML
1 INJECTION INTRAVENOUS ONCE
Status: COMPLETED | OUTPATIENT
Start: 2019-08-10 | End: 2019-08-10

## 2019-08-10 RX ORDER — POTASSIUM CHLORIDE 20 MEQ/1
40 TABLET, EXTENDED RELEASE ORAL ONCE
Status: COMPLETED | OUTPATIENT
Start: 2019-08-10 | End: 2019-08-10

## 2019-08-10 RX ADMIN — RIFAXIMIN 550 MG: 550 TABLET ORAL at 10:07

## 2019-08-10 RX ADMIN — POTASSIUM CHLORIDE 10 MEQ: 750 TABLET, FILM COATED, EXTENDED RELEASE ORAL at 10:07

## 2019-08-10 RX ADMIN — MICONAZOLE NITRATE: 20 POWDER TOPICAL at 10:43

## 2019-08-10 RX ADMIN — FOLIC ACID 1 MG: 1 TABLET ORAL at 10:07

## 2019-08-10 RX ADMIN — LACTULOSE 20 G: 20 SOLUTION ORAL at 14:02

## 2019-08-10 RX ADMIN — MICONAZOLE NITRATE: 20 POWDER TOPICAL at 22:28

## 2019-08-10 RX ADMIN — Medication 1 TABLET: at 10:08

## 2019-08-10 RX ADMIN — POTASSIUM CHLORIDE 40 MEQ: 20 TABLET, EXTENDED RELEASE ORAL at 10:06

## 2019-08-10 RX ADMIN — Medication 100 MG: at 10:08

## 2019-08-10 RX ADMIN — MAGNESIUM SULFATE HEPTAHYDRATE 1 G: 1 INJECTION, SOLUTION INTRAVENOUS at 10:04

## 2019-08-10 RX ADMIN — LACTULOSE 20 G: 20 SOLUTION ORAL at 10:07

## 2019-08-10 RX ADMIN — NADOLOL 20 MG: 20 TABLET ORAL at 10:08

## 2019-08-10 RX ADMIN — IOPAMIDOL 75 ML: 755 INJECTION, SOLUTION INTRAVENOUS at 18:29

## 2019-08-10 RX ADMIN — AMIODARONE HYDROCHLORIDE 200 MG: 200 TABLET ORAL at 10:07

## 2019-08-10 RX ADMIN — SODIUM CHLORIDE, PRESERVATIVE FREE 10 ML: 5 INJECTION INTRAVENOUS at 20:55

## 2019-08-10 RX ADMIN — DULOXETINE HYDROCHLORIDE 30 MG: 30 CAPSULE, DELAYED RELEASE ORAL at 10:07

## 2019-08-10 ASSESSMENT — PAIN SCALES - GENERAL
PAINLEVEL_OUTOF10: 0
PAINLEVEL_OUTOF10: 0
PAINLEVEL_OUTOF10: 3
PAINLEVEL_OUTOF10: 0

## 2019-08-10 ASSESSMENT — PAIN DESCRIPTION - LOCATION: LOCATION: HAND;OTHER (COMMENT)

## 2019-08-10 ASSESSMENT — PAIN SCALES - WONG BAKER: WONGBAKER_NUMERICALRESPONSE: 0

## 2019-08-10 NOTE — PROGRESS NOTES
Attempted NG placement, pt grabbing at nursing staff and shaking head no. Placement unsuccessful. Second attempt made and pt refusing.

## 2019-08-11 LAB
A/G RATIO: 0.9 (ref 1.1–2.2)
ALBUMIN SERPL-MCNC: 2.7 G/DL (ref 3.4–5)
ALP BLD-CCNC: 99 U/L (ref 40–129)
ALT SERPL-CCNC: 14 U/L (ref 10–40)
AMMONIA: 34 UMOL/L (ref 16–60)
ANION GAP SERPL CALCULATED.3IONS-SCNC: 12 MMOL/L (ref 3–16)
ANISOCYTOSIS: ABNORMAL
AST SERPL-CCNC: 36 U/L (ref 15–37)
BACTERIA: ABNORMAL /HPF
BANDED NEUTROPHILS RELATIVE PERCENT: 10 % (ref 0–7)
BASOPHILS ABSOLUTE: 0 K/UL (ref 0–0.2)
BASOPHILS RELATIVE PERCENT: 0 %
BILIRUB SERPL-MCNC: 1.8 MG/DL (ref 0–1)
BILIRUBIN URINE: NEGATIVE
BLOOD, URINE: ABNORMAL
BUN BLDV-MCNC: 5 MG/DL (ref 7–20)
CALCIUM SERPL-MCNC: 9.3 MG/DL (ref 8.3–10.6)
CHLORIDE BLD-SCNC: 108 MMOL/L (ref 99–110)
CLARITY: ABNORMAL
CO2: 23 MMOL/L (ref 21–32)
COLOR: YELLOW
CREAT SERPL-MCNC: 0.8 MG/DL (ref 0.8–1.3)
EOSINOPHILS ABSOLUTE: 0 K/UL (ref 0–0.6)
EOSINOPHILS RELATIVE PERCENT: 0 %
EPITHELIAL CELLS, UA: 0 /HPF (ref 0–5)
GFR AFRICAN AMERICAN: >60
GFR NON-AFRICAN AMERICAN: >60
GLOBULIN: 2.9 G/DL
GLUCOSE BLD-MCNC: 98 MG/DL (ref 70–99)
GLUCOSE URINE: NEGATIVE MG/DL
HCT VFR BLD CALC: 21.8 % (ref 40.5–52.5)
HEMATOLOGY PATH CONSULT: YES
HEMOGLOBIN: 7.5 G/DL (ref 13.5–17.5)
HYALINE CASTS: 16 /LPF (ref 0–8)
HYPOCHROMIA: ABNORMAL
KETONES, URINE: NEGATIVE MG/DL
LACTIC ACID: 1.8 MMOL/L (ref 0.4–2)
LEUKOCYTE ESTERASE, URINE: ABNORMAL
LYMPHOCYTES ABSOLUTE: 0.9 K/UL (ref 1–5.1)
LYMPHOCYTES RELATIVE PERCENT: 6 %
MAGNESIUM: 1.9 MG/DL (ref 1.8–2.4)
MCH RBC QN AUTO: 34.1 PG (ref 26–34)
MCHC RBC AUTO-ENTMCNC: 34.4 G/DL (ref 31–36)
MCV RBC AUTO: 99.2 FL (ref 80–100)
MICROSCOPIC EXAMINATION: YES
MONOCYTES ABSOLUTE: 2.1 K/UL (ref 0–1.3)
MONOCYTES RELATIVE PERCENT: 14 %
NEUTROPHILS ABSOLUTE: 12 K/UL (ref 1.7–7.7)
NEUTROPHILS RELATIVE PERCENT: 70 %
NITRITE, URINE: POSITIVE
PDW BLD-RTO: 15.3 % (ref 12.4–15.4)
PH UA: 7.5 (ref 5–8)
PLATELET # BLD: 73 K/UL (ref 135–450)
PLATELET SLIDE REVIEW: ABNORMAL
PMV BLD AUTO: 9.3 FL (ref 5–10.5)
POTASSIUM REFLEX MAGNESIUM: 3 MMOL/L (ref 3.5–5.1)
PROTEIN UA: ABNORMAL MG/DL
RBC # BLD: 2.2 M/UL (ref 4.2–5.9)
RBC UA: 7 /HPF (ref 0–4)
SLIDE REVIEW: ABNORMAL
SODIUM BLD-SCNC: 143 MMOL/L (ref 136–145)
SPECIFIC GRAVITY UA: 1.01 (ref 1–1.03)
TOTAL PROTEIN: 5.6 G/DL (ref 6.4–8.2)
URINE TYPE: ABNORMAL
UROBILINOGEN, URINE: 0.2 E.U./DL
WBC # BLD: 15 K/UL (ref 4–11)
WBC UA: 37 /HPF (ref 0–5)

## 2019-08-11 PROCEDURE — 81001 URINALYSIS AUTO W/SCOPE: CPT

## 2019-08-11 PROCEDURE — 82140 ASSAY OF AMMONIA: CPT

## 2019-08-11 PROCEDURE — 83735 ASSAY OF MAGNESIUM: CPT

## 2019-08-11 PROCEDURE — 94760 N-INVAS EAR/PLS OXIMETRY 1: CPT

## 2019-08-11 PROCEDURE — 99233 SBSQ HOSP IP/OBS HIGH 50: CPT | Performed by: SURGERY

## 2019-08-11 PROCEDURE — 87086 URINE CULTURE/COLONY COUNT: CPT

## 2019-08-11 PROCEDURE — 87077 CULTURE AEROBIC IDENTIFY: CPT

## 2019-08-11 PROCEDURE — 1200000000 HC SEMI PRIVATE

## 2019-08-11 PROCEDURE — 80053 COMPREHEN METABOLIC PANEL: CPT

## 2019-08-11 PROCEDURE — 2580000003 HC RX 258: Performed by: NURSE PRACTITIONER

## 2019-08-11 PROCEDURE — 6370000000 HC RX 637 (ALT 250 FOR IP): Performed by: INTERNAL MEDICINE

## 2019-08-11 PROCEDURE — 83605 ASSAY OF LACTIC ACID: CPT

## 2019-08-11 PROCEDURE — 6360000002 HC RX W HCPCS: Performed by: NURSE PRACTITIONER

## 2019-08-11 PROCEDURE — 87184 SC STD DISK METHOD PER PLATE: CPT

## 2019-08-11 PROCEDURE — 36415 COLL VENOUS BLD VENIPUNCTURE: CPT

## 2019-08-11 PROCEDURE — 85025 COMPLETE CBC W/AUTO DIFF WBC: CPT

## 2019-08-11 PROCEDURE — 87186 SC STD MICRODIL/AGAR DIL: CPT

## 2019-08-11 RX ORDER — SPIRONOLACTONE 25 MG/1
50 TABLET ORAL DAILY
Status: DISCONTINUED | OUTPATIENT
Start: 2019-08-11 | End: 2019-08-19 | Stop reason: HOSPADM

## 2019-08-11 RX ADMIN — RIFAXIMIN 550 MG: 550 TABLET ORAL at 08:56

## 2019-08-11 RX ADMIN — LACTULOSE 20 G: 20 SOLUTION ORAL at 21:24

## 2019-08-11 RX ADMIN — Medication 100 MG: at 08:55

## 2019-08-11 RX ADMIN — SODIUM CHLORIDE, PRESERVATIVE FREE 10 ML: 5 INJECTION INTRAVENOUS at 21:25

## 2019-08-11 RX ADMIN — Medication 1 TABLET: at 08:55

## 2019-08-11 RX ADMIN — Medication 10 MEQ: at 19:46

## 2019-08-11 RX ADMIN — Medication 10 MEQ: at 14:42

## 2019-08-11 RX ADMIN — MICONAZOLE NITRATE: 20 POWDER TOPICAL at 21:33

## 2019-08-11 RX ADMIN — SPIRONOLACTONE 50 MG: 25 TABLET ORAL at 14:20

## 2019-08-11 RX ADMIN — QUETIAPINE FUMARATE 12.5 MG: 25 TABLET ORAL at 21:24

## 2019-08-11 RX ADMIN — AMIODARONE HYDROCHLORIDE 200 MG: 200 TABLET ORAL at 08:55

## 2019-08-11 RX ADMIN — FOLIC ACID 1 MG: 1 TABLET ORAL at 08:55

## 2019-08-11 RX ADMIN — Medication 10 MEQ: at 16:00

## 2019-08-11 RX ADMIN — Medication 10 MEQ: at 17:09

## 2019-08-11 RX ADMIN — Medication 10 MEQ: at 18:29

## 2019-08-11 RX ADMIN — NADOLOL 20 MG: 20 TABLET ORAL at 08:55

## 2019-08-11 RX ADMIN — MICONAZOLE NITRATE: 20 POWDER TOPICAL at 14:21

## 2019-08-11 RX ADMIN — DULOXETINE HYDROCHLORIDE 30 MG: 30 CAPSULE, DELAYED RELEASE ORAL at 08:55

## 2019-08-11 RX ADMIN — RIFAXIMIN 550 MG: 550 TABLET ORAL at 21:24

## 2019-08-11 RX ADMIN — POTASSIUM CHLORIDE 10 MEQ: 750 TABLET, FILM COATED, EXTENDED RELEASE ORAL at 08:54

## 2019-08-11 RX ADMIN — SODIUM CHLORIDE, PRESERVATIVE FREE 10 ML: 5 INJECTION INTRAVENOUS at 14:21

## 2019-08-11 ASSESSMENT — PAIN DESCRIPTION - LOCATION: LOCATION: ABDOMEN

## 2019-08-11 ASSESSMENT — PAIN SCALES - WONG BAKER
WONGBAKER_NUMERICALRESPONSE: 0

## 2019-08-11 ASSESSMENT — PAIN SCALES - GENERAL
PAINLEVEL_OUTOF10: 6
PAINLEVEL_OUTOF10: 0
PAINLEVEL_OUTOF10: 6
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
## 2019-08-11 NOTE — PROGRESS NOTES
above.   10. Essential hypertension, we will start po meds, was hypotensive on presentation.      DVT Prophylaxis: SCD  Diet: Diet NPO Effective Now  Code Status: Full Code        Dispo - Ongoing management    Hernan Grider MD

## 2019-08-12 PROBLEM — E44.0 MODERATE MALNUTRITION (HCC): Status: ACTIVE | Noted: 2019-08-12

## 2019-08-12 LAB
A/G RATIO: 0.8 (ref 1.1–2.2)
ALBUMIN SERPL-MCNC: 2.3 G/DL (ref 3.1–4.9)
ALBUMIN SERPL-MCNC: 2.5 G/DL (ref 3.4–5)
ALP BLD-CCNC: 93 U/L (ref 40–129)
ALPHA-1-GLOBULIN: 0.2 G/DL (ref 0.2–0.4)
ALPHA-2-GLOBULIN: 0.4 G/DL (ref 0.4–1.1)
ALT SERPL-CCNC: 16 U/L (ref 10–40)
AMMONIA: 78 UMOL/L (ref 16–60)
ANION GAP SERPL CALCULATED.3IONS-SCNC: 10 MMOL/L (ref 3–16)
ANISOCYTOSIS: ABNORMAL
AST SERPL-CCNC: 46 U/L (ref 15–37)
ATYPICAL LYMPHOCYTE RELATIVE PERCENT: 2 % (ref 0–6)
BANDED NEUTROPHILS RELATIVE PERCENT: 5 % (ref 0–7)
BASOPHILS ABSOLUTE: 0.1 K/UL (ref 0–0.2)
BASOPHILS RELATIVE PERCENT: 1 %
BETA GLOBULIN: 0.7 G/DL (ref 0.9–1.6)
BILIRUB SERPL-MCNC: 1.6 MG/DL (ref 0–1)
BUN BLDV-MCNC: 5 MG/DL (ref 7–20)
CALCIUM SERPL-MCNC: 8.9 MG/DL (ref 8.3–10.6)
CHLORIDE BLD-SCNC: 109 MMOL/L (ref 99–110)
CO2: 22 MMOL/L (ref 21–32)
CREAT SERPL-MCNC: 0.7 MG/DL (ref 0.8–1.3)
EOSINOPHILS ABSOLUTE: 0.2 K/UL (ref 0–0.6)
EOSINOPHILS RELATIVE PERCENT: 2 %
GAMMA GLOBULIN: 1.5 G/DL (ref 0.6–1.8)
GFR AFRICAN AMERICAN: >60
GFR NON-AFRICAN AMERICAN: >60
GLOBULIN: 3.1 G/DL
GLUCOSE BLD-MCNC: 88 MG/DL (ref 70–99)
HCT VFR BLD CALC: 21.3 % (ref 40.5–52.5)
HEMATOLOGY PATH CONSULT: NORMAL
HEMOGLOBIN: 7.2 G/DL (ref 13.5–17.5)
LYMPHOCYTES ABSOLUTE: 1.5 K/UL (ref 1–5.1)
LYMPHOCYTES RELATIVE PERCENT: 17 %
MAGNESIUM: 1.8 MG/DL (ref 1.8–2.4)
MCH RBC QN AUTO: 33.8 PG (ref 26–34)
MCHC RBC AUTO-ENTMCNC: 33.8 G/DL (ref 31–36)
MCV RBC AUTO: 99.9 FL (ref 80–100)
MONOCYTES ABSOLUTE: 0.7 K/UL (ref 0–1.3)
MONOCYTES RELATIVE PERCENT: 9 %
MYELOCYTE PERCENT: 2 %
NEUTROPHILS ABSOLUTE: 5.3 K/UL (ref 1.7–7.7)
NEUTROPHILS RELATIVE PERCENT: 62 %
NUCLEATED RED BLOOD CELLS: 3 /100 WBC
NUCLEATED RED BLOOD CELLS: 3 /100 WBC
PDW BLD-RTO: 15.8 % (ref 12.4–15.4)
PLATELET # BLD: 88 K/UL (ref 135–450)
PLATELET SLIDE REVIEW: ABNORMAL
PMV BLD AUTO: 8.9 FL (ref 5–10.5)
POTASSIUM REFLEX MAGNESIUM: 3.3 MMOL/L (ref 3.5–5.1)
RBC # BLD: 2.13 M/UL (ref 4.2–5.9)
REASON FOR REJECTION: NORMAL
REJECTED TEST: NORMAL
SLIDE REVIEW: ABNORMAL
SODIUM BLD-SCNC: 141 MMOL/L (ref 136–145)
SPE/IFE INTERPRETATION: NORMAL
TOTAL PROTEIN: 5.1 G/DL (ref 6.4–8.2)
TOTAL PROTEIN: 5.6 G/DL (ref 6.4–8.2)
WBC # BLD: 7.7 K/UL (ref 4–11)

## 2019-08-12 PROCEDURE — 97162 PT EVAL MOD COMPLEX 30 MIN: CPT

## 2019-08-12 PROCEDURE — 82140 ASSAY OF AMMONIA: CPT

## 2019-08-12 PROCEDURE — 1200000000 HC SEMI PRIVATE

## 2019-08-12 PROCEDURE — 6370000000 HC RX 637 (ALT 250 FOR IP): Performed by: INTERNAL MEDICINE

## 2019-08-12 PROCEDURE — 80053 COMPREHEN METABOLIC PANEL: CPT

## 2019-08-12 PROCEDURE — 97535 SELF CARE MNGMENT TRAINING: CPT

## 2019-08-12 PROCEDURE — APPNB30 APP NON BILLABLE TIME 0-30 MINS: Performed by: PHYSICIAN ASSISTANT

## 2019-08-12 PROCEDURE — 2580000003 HC RX 258: Performed by: NURSE PRACTITIONER

## 2019-08-12 PROCEDURE — 83735 ASSAY OF MAGNESIUM: CPT

## 2019-08-12 PROCEDURE — APPSS30 APP SPLIT SHARED TIME 16-30 MINUTES: Performed by: PHYSICIAN ASSISTANT

## 2019-08-12 PROCEDURE — 97530 THERAPEUTIC ACTIVITIES: CPT

## 2019-08-12 PROCEDURE — 6370000000 HC RX 637 (ALT 250 FOR IP): Performed by: NURSE PRACTITIONER

## 2019-08-12 PROCEDURE — 99232 SBSQ HOSP IP/OBS MODERATE 35: CPT | Performed by: SURGERY

## 2019-08-12 PROCEDURE — 97116 GAIT TRAINING THERAPY: CPT

## 2019-08-12 PROCEDURE — 85025 COMPLETE CBC W/AUTO DIFF WBC: CPT

## 2019-08-12 PROCEDURE — 97166 OT EVAL MOD COMPLEX 45 MIN: CPT

## 2019-08-12 PROCEDURE — 36415 COLL VENOUS BLD VENIPUNCTURE: CPT

## 2019-08-12 PROCEDURE — 94761 N-INVAS EAR/PLS OXIMETRY MLT: CPT

## 2019-08-12 RX ORDER — MORPHINE SULFATE 2 MG/ML
2 INJECTION, SOLUTION INTRAMUSCULAR; INTRAVENOUS EVERY 4 HOURS PRN
Status: COMPLETED | OUTPATIENT
Start: 2019-08-12 | End: 2019-08-19

## 2019-08-12 RX ADMIN — MICONAZOLE NITRATE: 20 POWDER TOPICAL at 09:22

## 2019-08-12 RX ADMIN — RIFAXIMIN 550 MG: 550 TABLET ORAL at 20:38

## 2019-08-12 RX ADMIN — SPIRONOLACTONE 50 MG: 25 TABLET ORAL at 09:22

## 2019-08-12 RX ADMIN — SODIUM CHLORIDE, PRESERVATIVE FREE 10 ML: 5 INJECTION INTRAVENOUS at 09:23

## 2019-08-12 RX ADMIN — Medication 100 MG: at 09:22

## 2019-08-12 RX ADMIN — POTASSIUM CHLORIDE 40 MEQ: 20 TABLET, EXTENDED RELEASE ORAL at 06:44

## 2019-08-12 RX ADMIN — LACTULOSE 20 G: 20 SOLUTION ORAL at 14:45

## 2019-08-12 RX ADMIN — POTASSIUM CHLORIDE 10 MEQ: 750 TABLET, FILM COATED, EXTENDED RELEASE ORAL at 09:22

## 2019-08-12 RX ADMIN — NADOLOL 20 MG: 20 TABLET ORAL at 09:22

## 2019-08-12 RX ADMIN — SODIUM CHLORIDE, PRESERVATIVE FREE 10 ML: 5 INJECTION INTRAVENOUS at 20:41

## 2019-08-12 RX ADMIN — AMIODARONE HYDROCHLORIDE 200 MG: 200 TABLET ORAL at 09:22

## 2019-08-12 RX ADMIN — LACTULOSE 20 G: 20 SOLUTION ORAL at 09:21

## 2019-08-12 RX ADMIN — Medication 1 TABLET: at 09:22

## 2019-08-12 RX ADMIN — RIFAXIMIN 550 MG: 550 TABLET ORAL at 09:22

## 2019-08-12 RX ADMIN — MICONAZOLE NITRATE: 20 POWDER TOPICAL at 20:41

## 2019-08-12 RX ADMIN — FOLIC ACID 1 MG: 1 TABLET ORAL at 09:22

## 2019-08-12 RX ADMIN — LACTULOSE 20 G: 20 SOLUTION ORAL at 20:38

## 2019-08-12 RX ADMIN — DULOXETINE HYDROCHLORIDE 30 MG: 30 CAPSULE, DELAYED RELEASE ORAL at 09:22

## 2019-08-12 RX ADMIN — QUETIAPINE FUMARATE 12.5 MG: 25 TABLET ORAL at 20:38

## 2019-08-12 ASSESSMENT — PAIN SCALES - PAIN ASSESSMENT IN ADVANCED DEMENTIA (PAINAD)
NEGVOCALIZATION: 0
TOTALSCORE: 0
BREATHING: 0
FACIALEXPRESSION: 0
BODYLANGUAGE: 0
CONSOLABILITY: 0

## 2019-08-12 ASSESSMENT — PAIN DESCRIPTION - FREQUENCY: FREQUENCY: CONTINUOUS

## 2019-08-12 ASSESSMENT — PAIN DESCRIPTION - PAIN TYPE: TYPE: ACUTE PAIN

## 2019-08-12 ASSESSMENT — PAIN DESCRIPTION - ORIENTATION: ORIENTATION: OTHER (COMMENT)

## 2019-08-12 ASSESSMENT — PAIN - FUNCTIONAL ASSESSMENT: PAIN_FUNCTIONAL_ASSESSMENT: PREVENTS OR INTERFERES SOME ACTIVE ACTIVITIES AND ADLS

## 2019-08-12 ASSESSMENT — PAIN DESCRIPTION - DESCRIPTORS: DESCRIPTORS: CONSTANT;DISCOMFORT

## 2019-08-12 ASSESSMENT — PAIN SCALES - WONG BAKER
WONGBAKER_NUMERICALRESPONSE: 0

## 2019-08-12 ASSESSMENT — PAIN SCALES - GENERAL
PAINLEVEL_OUTOF10: 0
PAINLEVEL_OUTOF10: 1

## 2019-08-12 ASSESSMENT — PAIN DESCRIPTION - DIRECTION: RADIATING_TOWARDS: NON-RADIATING

## 2019-08-12 ASSESSMENT — PAIN DESCRIPTION - LOCATION: LOCATION: ABDOMEN

## 2019-08-12 ASSESSMENT — PAIN DESCRIPTION - PROGRESSION: CLINICAL_PROGRESSION: NOT CHANGED

## 2019-08-12 ASSESSMENT — PAIN DESCRIPTION - ONSET: ONSET: ON-GOING

## 2019-08-12 NOTE — PROGRESS NOTES
tenderness   Throat:   Lips, mucosa, and tongue normal; teeth and gums normal   Neck:   Supple, symmetrical, trachea midline, no adenopathy;        thyroid:  No enlargement/tenderness/nodules; no carotid    bruit or JVD   Back:     Symmetric, no curvature, ROM normal, no CVA tenderness   Lungs:     Clear to auscultation bilaterally, respirations unlabored   Chest wall:    No tenderness or deformity   Heart:    Regular rate and rhythm, S1 and S2 normal, no murmur, rub   or gallop   Abdomen:     Distended but soft, bowel sounds active all four quadrants,     no masses, no organomegaly           Extremities:   Extremities normal, atraumatic, no cyanosis or edema   Pulses:   2+ and symmetric all extremities   Skin:   Skin color, texture, turgor normal, no rashes or lesions   Lymph nodes:   Cervical, supraclavicular, and axillary nodes normal   Neurologic:   Stable         Data Review  CBC:   Lab Results   Component Value Date    WBC 15.0 08/11/2019    RBC 2.20 08/11/2019       Assessment:     Principal Problem:    Abdominal pain  Active Problems:    Acute metabolic encephalopathy    Alcoholic cirrhosis (HCC)    Dementia    Thrombocytopenia (HCC)    Hypokalemia    Urinary tract infection associated with cystostomy catheter (HCC)    Generalized weakness    JAIME (acute kidney injury) (HCC)    Elevated lactic acid level    Cecal volvulus (HCC)    Altered mental status    Dilatation of colon    Ileus (HCC)  Resolved Problems:    * No resolved hospital problems. *      Plan:     1. Anemia\thrombocytopenia. His counts are back close to his baseline. The acute drop in his platelet count was likely due to increased consumption due to infection as well as medications (i.e. Antibiotics). 2.  Hepatic encephalopathy.   I will defer to the primary team.        Electronically signed by Cristóbal Baez MD on 8/12/2019 at 7:59 AM

## 2019-08-12 NOTE — PROGRESS NOTES
lactic acid level [R79.89]    Abdominal pain [R10.9]    JAIME (acute kidney injury) ([facility] [N17.9]    Hypokalemia [E87.6]    Urinary tract infection associated with cystostomy catheter ([facility] [T83.510A, N39.0]    Thrombocytopenia (HCC) [D69.6]    Generalized weakness [R53.1]    Acute metabolic encephalopathy [F84.50]    Alcoholic cirrhosis ([facility] [N09.59]    Dementia [F03.90]     1. Ileus, CT abdomen and pelvis without contrast showed a markedly abnormal dilated cecum appearing to be folded anteriorly. GS consulted, GI consulted, colonoscopy without ischemia or volvulus done during this admission, fluctuating distention and discomfort. Advanced diet to clear liquid. 2. Thought to have UTI, likely colonization, on diflucan, received zyvox. 3. JAIME, improved. 4. Elevated LA, then improved. 5. Severe hypokalemia, still replacing per protocol.    6. Alcoholic cirrhosis, monitor for now. Restart spironolactone. 7. Pancytopenia, likely liver disease related, keeps worsening, no signs of active bleeding.    8. Acute encephalopathy, metabolic ,multofactorial, monitor for now, treatment as above.   9. Generalized weakness, as above.   10. Essential hypertension    Diet: DIET CLEAR LIQUID;  Dietary Nutrition Supplements: Clear Liquid Oral Supplement  Code Status: Full Code        Padmini Hassan MD

## 2019-08-12 NOTE — PROGRESS NOTES
General and Vascular Surgery                                                           Daily Progress Note                                                             Radha Braga PA-C     Pt Name: Kaye Warner Record Number: [id number]  Date of Birth 1950   Today's Date: 8/12/2019      ASSESSMENT  1. Cecal dilation, SBO vs Ileus, abd pain, Nausea/emesis  2. Pain improved  3. +flatulce and BMS  4. Will advance diet to clear liquids as tolerated  5. Leukocytosis improving  6. OOB as tolerated with assistance  7. High risk surgical patient, will closely monitor for any surgical emergencies    EDUCATION  Patient educated about their illness/diagnosis, stated above, and all questions answered. We discussed the importance of nutrition, medications they are taking, and healthy lifestyle. Samantha Panning has improved from yesterday. Pain is well controlled. OBJECTIVE  VITALS:  height is 5' 6\" (1.676 m) and weight is 211 lb 6.7 oz (95.9 kg). His axillary temperature is 98.6 °F (37 °C). His blood pressure is 144/69 (abnormal) and his pulse is 91. His respiration is 20 and oxygen saturation is 93%. VITALS:  BP (!) 144/69   Pulse 91   Temp 98.6 °F (37 °C) (Axillary)   Resp 20   Ht 5' 6\" (1.676 m)   Wt 211 lb 6.7 oz (95.9 kg)   SpO2 93%   BMI 34.12 kg/m²   GENERAL: alert, cooperative, no distress  ABDOMEN: tenderness present- minimal,  without rebound and guarding and distention present- obese  I/O last 3 completed shifts: In: 600 [IV FTADTFZPD:950]  Out: 0837 [[id number]]  No intake/output data recorded.     LABS  Recent Labs     08/11/19  0555  08/11/19  1950 08/12/19  0532   WBC 15.0*  --   --   --    HGB 7.5*  --   --   --    HCT 21.8*  --   --   --    PLT 73*  --   --   --    NA  --    < >  --  141   K  --    < >  --  3.3*   CL  --    < >  --  109   CO2  --    < >  --  22   BUN  --    < >  --  5*   CREATININE  --    < >  --  0.7*   MG

## 2019-08-12 NOTE — PROGRESS NOTES
Physical Therapy    Facility/Department: [address] PROGRESSIVE CARE  Initial Assessment    NAME: Paola Luevano  : 1950  MRN: [id number]    Date of Service: 2019    Discharge Recommendations:  Continue to assess pending progress, ECF with PT, Patient would benefit from continued therapy after discharge, 3-5 sessions per week     Paola Luevano scored a 17/24 on the AM-PAC short mobility form. Current research shows that an AM-PAC score of 17 or less is typically not associated with a discharge to the patient's home setting. Based on the patients AM-PAC score and their current functional mobility deficits, it is recommended that the patient have 3-5 sessions per week of Physical Therapy at d/c to increase the patients independence. Assessment   Body structures, Functions, Activity limitations: Decreased functional mobility ; Decreased safe awareness;Decreased cognition;Decreased endurance  Assessment: Pt presents today mildly below prior level of walking with a wh walker at a facility. At time of last d/c from [facility] pt was mars ot walk 61' with his wh walker. Today, pt required min A for bed mobility and CGA for walking 40' with the wh walker with mild SOB and impulsive and unsafe technique. Pt would benefit from continued therapy to increase safety either here or at [facility]. Treatment Diagnosis: decreased functional mobility following ileus  Specific instructions for Next Treatment: increase gt  Prognosis: Good  Decision Making: Medium Complexity  History: see above  Exam: see above  Clinical Presentation: evolving  Barriers to Learning: decreased cognition  REQUIRES PT FOLLOW UP: Yes  Activity Tolerance  Activity Tolerance: Patient Tolerated treatment well;Patient limited by endurance       Patient Diagnosis(es): The primary encounter diagnosis was Cecal volvulus ([facility]  Diagnoses of Alcoholic cirrhosis of liver without ascites ([facility] Altered mental status, unspecified altered mental status type, and Hypokalemia were also pertinent to this visit. has a past medical history of Abnormality of gait, Alcohol dependence ([facility] Alcoholic cirrhosis ([facility] Anxiety, Arthritis, Basal cell carcinoma, Cerebral artery occlusion with cerebral infarction ([facility] Circulation problem, Dementia, Depressive disorder, GERD (gastroesophageal reflux disease), Hypomagnesemia, Hypopotassemia, MDRO (multiple drug resistant organisms) resistance, SOB (shortness of breath), Spinal stenosis, Suicidal behavior, Syncope, Thrombocytopenia (HCC), Traumatic brain injury ([facility] VRE (vancomycin resistant enterococcus) culture positive, Wears dentures, Wears glasses, and Wheezing. has a past surgical history that includes Upper gastrointestinal endoscopy; Colonoscopy; Upper gastrointestinal endoscopy (01/04/2018); Colonoscopy (08/22/2018); and Colonoscopy (N/A, 8/2/2019).     Restrictions  Restrictions/Precautions  Restrictions/Precautions: Fall Risk  Position Activity Restriction  Other position/activity restrictions: NPO  Vision/Hearing  Hearing: Exceptions to [facility]     Subjective  General  Chart Reviewed: Yes  Patient assessed for rehabilitation services?: Yes  Additional Pertinent Hx: Pt presents from ECF with altered mental status and nausea/refusing to eat, ileus and poor surgical candidate  Response To Previous Treatment: Not applicable  Family / Caregiver Present: No  Referring Practitioner: Callie Hines  Referral Date : 08/11/19  Diagnosis: decreased functional mobility following ileus  Follows Commands: Within Functional Limits(extra time, one step commands)  General Comment  Comments: pt with c/o nausea and abd pain and cramping, agrees to get up  Pain Screening  Patient Currently in Pain: Yes  Vital Signs  Patient Currently in Pain: Yes       Orientation  Orientation  Overall Orientation Status: Impaired(knew name, birthday, place, not date)  Orientation Level: Oriented to place;Oriented to situation;Oriented to within reach, Chair alarm in place, Gait belt, Left in chair, Nurse notified, Umm Carlson in use    AM-PAC Score  AM-PAC Inpatient Mobility Raw Score : 17 (08/12/19 0959)  AM-PAC Inpatient T-Scale Score : 42.13 (08/12/19 0959)  Mobility Inpatient CMS 0-100% Score: 50.57 (08/12/19 0959)  Mobility Inpatient CMS G-Code Modifier : CK (08/12/19 0959)     Goals  Short term goals  Time Frame for Short term goals: 1 week  Short term goal 1: bed mobility S to modif I  Short term goal 2: trasnfers S to modif I  Short term goal 3: amb 100' in room (due to contact precautions) with wh walker SBA, min SOB  Patient Goals   Patient goals : none stated       Therapy Time   Individual Concurrent Group Co-treatment   Time In 0910         Time Out 0955         Minutes 45         Timed Code Treatment Minutes: 39 Minutes    If pt is discharged before subsequent therapy sessions, this note will serve as the discharge summary.       Mauro Dejesus, [address]

## 2019-08-12 NOTE — PROGRESS NOTES
overall mod A for ADLs  OT Education: OT Role;Plan of Care;Transfer Training;Orientation  REQUIRES OT FOLLOW UP: Yes  Activity Tolerance  Activity Tolerance: Treatment limited secondary to decreased cognition;Patient limited by fatigue  Safety Devices  Safety Devices in place: Yes  Type of devices: Left in chair;Gait belt;Call light within reach;Nurse notified; Chair alarm in place; Patient at risk for falls           Patient Diagnosis(es): The primary encounter diagnosis was Cecal volvulus ([facility] [address] Diagnoses of Alcoholic cirrhosis of liver without ascites ([facility] [address] Altered mental status, unspecified altered mental status type, and Hypokalemia were also pertinent to this visit. has a past medical history of Abnormality of gait, Alcohol dependence ([facility] [address] Alcoholic cirrhosis ([facility] [address] Anxiety, Arthritis, Basal cell carcinoma, Cerebral artery occlusion with cerebral infarction ([facility] [address] Circulation problem, Dementia, Depressive disorder, GERD (gastroesophageal reflux disease), Hypomagnesemia, Hypopotassemia, MDRO (multiple drug resistant organisms) resistance, SOB (shortness of breath), Spinal stenosis, Suicidal behavior, Syncope, Thrombocytopenia (HCC), Traumatic brain injury ([facility] [address] VRE (vancomycin resistant enterococcus) culture positive, Wears dentures, Wears glasses, and Wheezing. has a past surgical history that includes Upper gastrointestinal endoscopy; Colonoscopy; Upper gastrointestinal endoscopy (01/04/2018); Colonoscopy (08/22/2018); and Colonoscopy (N/A, 8/2/2019).            Restrictions  Restrictions/Precautions  Restrictions/Precautions: Fall Risk  Position Activity Restriction  Other position/activity restrictions: NPO    Subjective   General  Chart Reviewed: Yes  Patient assessed for rehabilitation services?: Yes  Additional Pertinent Hx: Per H&P: \"75 y.o. male with PMHx of dementia, hepatic encephalopathy, alcoholic cirrhosis, and hyperammonemia  presented to [facility] ED for altered mental status and Baseline dementia        Sensation  Overall Sensation Status: WFL        LUE AROM (degrees)  LUE AROM : WFL  Left Hand AROM (degrees)  Left Hand AROM: WFL  RUE AROM (degrees)  RUE AROM : WFL  Right Hand AROM (degrees)  Right Hand AROM: WFL  LUE Strength  Gross LUE Strength: WFL  RUE Strength  Gross RUE Strength: WFL                   Plan   Plan  Times per week: 3-5  Times per day: Daily  Current Treatment Recommendations: Strengthening, Functional Mobility Training, Endurance Training, Balance Training, Safety Education & Training, Self-Care / ADL, Cognitive Reorientation, Neuromuscular Re-education      AM-PAC Score        AM-PAC Inpatient Daily Activity Raw Score: 15 (08/12/19 1002)  AM-PAC Inpatient ADL T-Scale Score : 34.69 (08/12/19 1002)  ADL Inpatient CMS 0-100% Score: 56.46 (08/12/19 1002)  ADL Inpatient CMS G-Code Modifier : CK (08/12/19 1002)    Goals  Short term goals  Time Frame for Short term goals: Prior to d/c  Short term goal 1: Pt will perform functional transfers to/from ADL surfaces with supervision  Short term goal 2: Pt will groom with supervision  Short term goal 3: Pt will toilet with min A  Short term goal 4: Pt will perform UB bathing with supervision  Short term goal 5: Pt will increase strength/endurance to achieve the above goals  Long term goals  Time Frame for Long term goals : LTG=STG  Patient Goals   Patient goals : None stated       Therapy Time   Individual Concurrent Group Co-treatment   Time In 0910         Time Out 0955         Minutes 45         Timed Code Treatment Minutes: 30 Minutes(15 min eval)     This note to serve as OT d/c summary if pt is d/c-ed prior to next therapy session.     Jennifer Bishop, OTR/L [id number]

## 2019-08-13 ENCOUNTER — APPOINTMENT (OUTPATIENT)
Dept: GENERAL RADIOLOGY | Age: 69
DRG: 388 | End: 2019-08-13
Payer: MEDICARE

## 2019-08-13 LAB
A/G RATIO: 0.8 (ref 1.1–2.2)
ALBUMIN SERPL-MCNC: 2.5 G/DL (ref 3.4–5)
ALP BLD-CCNC: 90 U/L (ref 40–129)
ALT SERPL-CCNC: 16 U/L (ref 10–40)
AMMONIA: 53 UMOL/L (ref 16–60)
ANION GAP SERPL CALCULATED.3IONS-SCNC: 12 MMOL/L (ref 3–16)
AST SERPL-CCNC: 36 U/L (ref 15–37)
BASOPHILS ABSOLUTE: 0 K/UL (ref 0–0.2)
BASOPHILS RELATIVE PERCENT: 0.4 %
BILIRUB SERPL-MCNC: 1.8 MG/DL (ref 0–1)
BUN BLDV-MCNC: 6 MG/DL (ref 7–20)
CALCIUM SERPL-MCNC: 8.7 MG/DL (ref 8.3–10.6)
CHLORIDE BLD-SCNC: 110 MMOL/L (ref 99–110)
CO2: 20 MMOL/L (ref 21–32)
CREAT SERPL-MCNC: 0.9 MG/DL (ref 0.8–1.3)
EOSINOPHILS ABSOLUTE: 0.2 K/UL (ref 0–0.6)
EOSINOPHILS RELATIVE PERCENT: 1.6 %
GFR AFRICAN AMERICAN: >60
GFR NON-AFRICAN AMERICAN: >60
GLOBULIN: 3.1 G/DL
GLUCOSE BLD-MCNC: 104 MG/DL (ref 70–99)
HCT VFR BLD CALC: 22.2 % (ref 40.5–52.5)
HEMOGLOBIN: 7.5 G/DL (ref 13.5–17.5)
LYMPHOCYTES ABSOLUTE: 1.2 K/UL (ref 1–5.1)
LYMPHOCYTES RELATIVE PERCENT: 12.2 %
MAGNESIUM: 1.6 MG/DL (ref 1.8–2.4)
MCH RBC QN AUTO: 34.3 PG (ref 26–34)
MCHC RBC AUTO-ENTMCNC: 33.6 G/DL (ref 31–36)
MCV RBC AUTO: 102.2 FL (ref 80–100)
MONOCYTES ABSOLUTE: 1.3 K/UL (ref 0–1.3)
MONOCYTES RELATIVE PERCENT: 13.8 %
NEUTROPHILS ABSOLUTE: 6.9 K/UL (ref 1.7–7.7)
NEUTROPHILS RELATIVE PERCENT: 72 %
PDW BLD-RTO: 16 % (ref 12.4–15.4)
PLATELET # BLD: 94 K/UL (ref 135–450)
PMV BLD AUTO: 8.6 FL (ref 5–10.5)
POTASSIUM REFLEX MAGNESIUM: 3 MMOL/L (ref 3.5–5.1)
RBC # BLD: 2.17 M/UL (ref 4.2–5.9)
SODIUM BLD-SCNC: 142 MMOL/L (ref 136–145)
TOTAL PROTEIN: 5.6 G/DL (ref 6.4–8.2)
WBC # BLD: 9.6 K/UL (ref 4–11)

## 2019-08-13 PROCEDURE — 6360000002 HC RX W HCPCS: Performed by: INTERNAL MEDICINE

## 2019-08-13 PROCEDURE — 1200000000 HC SEMI PRIVATE

## 2019-08-13 PROCEDURE — 82140 ASSAY OF AMMONIA: CPT

## 2019-08-13 PROCEDURE — 97530 THERAPEUTIC ACTIVITIES: CPT

## 2019-08-13 PROCEDURE — 6370000000 HC RX 637 (ALT 250 FOR IP): Performed by: INTERNAL MEDICINE

## 2019-08-13 PROCEDURE — 36415 COLL VENOUS BLD VENIPUNCTURE: CPT

## 2019-08-13 PROCEDURE — 80053 COMPREHEN METABOLIC PANEL: CPT

## 2019-08-13 PROCEDURE — 97535 SELF CARE MNGMENT TRAINING: CPT

## 2019-08-13 PROCEDURE — APPNB30 APP NON BILLABLE TIME 0-30 MINS: Performed by: PHYSICIAN ASSISTANT

## 2019-08-13 PROCEDURE — 6360000002 HC RX W HCPCS: Performed by: NURSE PRACTITIONER

## 2019-08-13 PROCEDURE — 2700000000 HC OXYGEN THERAPY PER DAY

## 2019-08-13 PROCEDURE — 2580000003 HC RX 258: Performed by: INTERNAL MEDICINE

## 2019-08-13 PROCEDURE — 74019 RADEX ABDOMEN 2 VIEWS: CPT

## 2019-08-13 PROCEDURE — 83735 ASSAY OF MAGNESIUM: CPT

## 2019-08-13 PROCEDURE — 6370000000 HC RX 637 (ALT 250 FOR IP): Performed by: NURSE PRACTITIONER

## 2019-08-13 PROCEDURE — APPSS30 APP SPLIT SHARED TIME 16-30 MINUTES: Performed by: PHYSICIAN ASSISTANT

## 2019-08-13 PROCEDURE — 2580000003 HC RX 258: Performed by: NURSE PRACTITIONER

## 2019-08-13 PROCEDURE — 94760 N-INVAS EAR/PLS OXIMETRY 1: CPT

## 2019-08-13 PROCEDURE — 85025 COMPLETE CBC W/AUTO DIFF WBC: CPT

## 2019-08-13 RX ADMIN — RIFAXIMIN 550 MG: 550 TABLET ORAL at 08:50

## 2019-08-13 RX ADMIN — RIFAXIMIN 550 MG: 550 TABLET ORAL at 20:47

## 2019-08-13 RX ADMIN — SODIUM CHLORIDE, PRESERVATIVE FREE 10 ML: 5 INJECTION INTRAVENOUS at 20:48

## 2019-08-13 RX ADMIN — MICONAZOLE NITRATE: 20 POWDER TOPICAL at 08:51

## 2019-08-13 RX ADMIN — LACTULOSE 20 G: 20 SOLUTION ORAL at 20:47

## 2019-08-13 RX ADMIN — Medication 100 MG: at 08:50

## 2019-08-13 RX ADMIN — SPIRONOLACTONE 50 MG: 25 TABLET ORAL at 08:50

## 2019-08-13 RX ADMIN — LACTULOSE 20 G: 20 SOLUTION ORAL at 08:49

## 2019-08-13 RX ADMIN — POTASSIUM BICARBONATE 40 MEQ: 782 TABLET, EFFERVESCENT ORAL at 10:57

## 2019-08-13 RX ADMIN — ONDANSETRON 4 MG: 2 INJECTION INTRAMUSCULAR; INTRAVENOUS at 08:50

## 2019-08-13 RX ADMIN — NADOLOL 20 MG: 20 TABLET ORAL at 08:50

## 2019-08-13 RX ADMIN — Medication 1 TABLET: at 08:50

## 2019-08-13 RX ADMIN — AMIODARONE HYDROCHLORIDE 200 MG: 200 TABLET ORAL at 08:50

## 2019-08-13 RX ADMIN — MAGNESIUM SULFATE HEPTAHYDRATE 3 G: 500 INJECTION, SOLUTION INTRAMUSCULAR; INTRAVENOUS at 12:22

## 2019-08-13 RX ADMIN — QUETIAPINE FUMARATE 12.5 MG: 25 TABLET ORAL at 20:47

## 2019-08-13 RX ADMIN — SODIUM CHLORIDE, PRESERVATIVE FREE 10 ML: 5 INJECTION INTRAVENOUS at 08:51

## 2019-08-13 RX ADMIN — DULOXETINE HYDROCHLORIDE 30 MG: 30 CAPSULE, DELAYED RELEASE ORAL at 08:50

## 2019-08-13 RX ADMIN — FOLIC ACID 1 MG: 1 TABLET ORAL at 08:50

## 2019-08-13 RX ADMIN — MORPHINE SULFATE 2 MG: 2 INJECTION, SOLUTION INTRAMUSCULAR; INTRAVENOUS at 00:07

## 2019-08-13 RX ADMIN — LACTULOSE 20 G: 20 SOLUTION ORAL at 14:17

## 2019-08-13 RX ADMIN — MICONAZOLE NITRATE: 20 POWDER TOPICAL at 20:48

## 2019-08-13 RX ADMIN — POTASSIUM CHLORIDE 10 MEQ: 750 TABLET, FILM COATED, EXTENDED RELEASE ORAL at 08:50

## 2019-08-13 ASSESSMENT — PAIN SCALES - GENERAL
PAINLEVEL_OUTOF10: 8
PAINLEVEL_OUTOF10: 0

## 2019-08-13 NOTE — PLAN OF CARE
Problem: Falls - Risk of:  Goal: Will remain free from falls  Description  Will remain free from falls  8/12/2019 2319 by Sanjuanita Nunez RN  Outcome: Ongoing     Problem: Falls - Risk of:  Goal: Absence of physical injury  Description  Absence of physical injury  8/12/2019 2319 by Sanjuanita Nunez RN  Outcome: Ongoing     Problem: Risk for Impaired Skin Integrity  Goal: Tissue integrity - skin and mucous membranes  Description  Structural intactness and normal physiological function of skin and  mucous membranes.   8/12/2019 2319 by Sanjuanita Nunez RN  Outcome: Ongoing     Problem: Health Behavior:  Goal: Identification of resources available to assist in meeting health care needs will improve  Description  Identification of resources available to assist in meeting health care needs will improve  8/12/2019 2319 by Sanjuanita Nunez RN  Outcome: Ongoing     Problem: Self-Care:  Goal: Ability to participate in self-care as condition permits will improve  Description  Ability to participate in self-care as condition permits will improve  8/12/2019 2319 by Sanjuanita Nunez RN  Outcome: Ongoing     Problem: Pain:  Goal: Pain level will decrease  Description  Pain level will decrease  8/12/2019 2319 by Sanjuanita Nunez RN  Outcome: Ongoing     Problem: Pain:  Goal: Control of acute pain  Description  Control of acute pain  8/12/2019 2319 by Sanjuanita Nunez RN  Outcome: Ongoing     Problem: Pain:  Goal: Control of chronic pain  Description  Control of chronic pain  8/12/2019 2319 by Sanjuanita Nunez RN  Outcome: Ongoing     Problem: Nutrition  Goal: Optimal nutrition therapy  8/12/2019 2319 by Sanjuanita Nunez RN  Outcome: Ongoing

## 2019-08-13 NOTE — PROGRESS NOTES
CO2  --    < > 20*   BUN  --    < > 6*   CREATININE  --    < > 0.9   MG  --    < > 1.60*   CALCIUM  --    < > 8.7   AST  --    < > 36   ALT  --    < > 16   BILITOT  --    < > 1.8*   NITRU POSITIVE*  --   --    COLORU YELLOW  --   --    BACTERIA 2+*  --   --     < > = values in this interval not displayed.      CBC:   Lab Results   Component Value Date    WBC 9.6 08/13/2019    RBC 2.17 08/13/2019    HGB 7.5 08/13/2019    HCT 22.2 08/13/2019    .2 08/13/2019    MCH 34.3 08/13/2019    MCHC 33.6 08/13/2019    RDW 16.0 08/13/2019    PLT 94 08/13/2019    MPV 8.6 08/13/2019     CMP:    Lab Results   Component Value Date     08/13/2019    K 3.0 08/13/2019     08/13/2019    CO2 20 08/13/2019    BUN 6 08/13/2019    CREATININE 0.9 08/13/2019    GFRAA >60 08/13/2019    GFRAA >60 04/02/2013    AGRATIO 0.8 08/13/2019    LABGLOM >60 08/13/2019    GLUCOSE 104 08/13/2019    PROT 5.6 08/13/2019    LABALBU 2.5 08/13/2019    CALCIUM 8.7 08/13/2019    BILITOT 1.8 08/13/2019    ALKPHOS 90 08/13/2019    AST 36 08/13/2019    ALT 16 08/13/2019         Jamie Bhatia PA-C  Electronically signed 8/13/2019 at 7:16 AM

## 2019-08-13 NOTE — PROGRESS NOTES
Hematology Oncology Daily Progress Note    Admit Date: 8/1/2019  Hospital day several    Subjective:     Patient lethargic--no new events. .   Medication side effects: none    Scheduled Meds:   magnesium sulfate  3 g Intravenous Once    spironolactone  50 mg Oral Daily    QUEtiapine  12.5 mg Oral Nightly    lactulose  20 g Oral TID    potassium chloride  10 mEq Oral Daily    amiodarone  200 mg Oral Daily    DULoxetine  30 mg Oral Daily    folic acid  1 mg Oral Daily    miconazole   Topical BID    nadolol  20 mg Oral Daily    rifaximin  550 mg Oral BID    vitamin B-1  100 mg Oral Daily    magnesium cl-calcium carbonate  1 tablet Oral Daily    lidocaine 1 % injection  5 mL Intradermal Once    sodium chloride flush  10 mL Intravenous 2 times per day     Continuous Infusions:  PRN Meds:morphine, sodium chloride flush, magnesium hydroxide, ondansetron, potassium chloride **OR** potassium alternative oral replacement **OR** potassium chloride    Review of Systems  Unable to obtain    Objective:     Patient Vitals for the past 8 hrs:   BP Temp Temp src Pulse Resp SpO2 Weight   08/13/19 1110 111/63 98.6 °F (37 °C) Oral 59 20 96 % --   08/13/19 0847 -- -- -- 65 -- 92 % --   08/13/19 0846 -- -- -- 65 -- (!) 85 % --   08/13/19 0845 118/70 98.8 °F (37.1 °C) Axillary 65 18 (!) 82 % --   08/13/19 0834 -- -- -- -- 16 97 % --   08/13/19 0333 (!) 155/81 98 °F (36.7 °C) Oral 65 16 97 % --   08/13/19 0330 -- -- -- -- -- -- 212 lb 15.4 oz (96.6 kg)     I/O last 3 completed shifts:   In: 250 [P.O.:250]  Out: 3007 [Urine:1725]  I/O this shift:  In: 600 [P.O.:600]  Out: 300 [Urine:300]    /63   Pulse 59   Temp 98.6 °F (37 °C) (Oral)   Resp 20   Ht 5' 6\" (1.676 m)   Wt 212 lb 15.4 oz (96.6 kg)   SpO2 96%   BMI 34.37 kg/m²     General Appearance:    Alert, cooperative, no distress, appears stated age   Head:    Normocephalic, without obvious abnormality, atraumatic   Eyes:    PERRL, conjunctiva/corneas clear, EOM's

## 2019-08-14 LAB
A/G RATIO: 0.8 (ref 1.1–2.2)
ALBUMIN SERPL-MCNC: 2.5 G/DL (ref 3.4–5)
ALP BLD-CCNC: 85 U/L (ref 40–129)
ALT SERPL-CCNC: 16 U/L (ref 10–40)
AMMONIA: 38 UMOL/L (ref 16–60)
ANION GAP SERPL CALCULATED.3IONS-SCNC: 11 MMOL/L (ref 3–16)
ANISOCYTOSIS: ABNORMAL
AST SERPL-CCNC: 32 U/L (ref 15–37)
BASOPHILS ABSOLUTE: 0 K/UL (ref 0–0.2)
BASOPHILS RELATIVE PERCENT: 0 %
BILIRUB SERPL-MCNC: 1.4 MG/DL (ref 0–1)
BUN BLDV-MCNC: 4 MG/DL (ref 7–20)
CALCIUM SERPL-MCNC: 8.5 MG/DL (ref 8.3–10.6)
CHLORIDE BLD-SCNC: 110 MMOL/L (ref 99–110)
CO2: 21 MMOL/L (ref 21–32)
CREAT SERPL-MCNC: 0.8 MG/DL (ref 0.8–1.3)
EOSINOPHILS ABSOLUTE: 0.1 K/UL (ref 0–0.6)
EOSINOPHILS RELATIVE PERCENT: 2 %
GFR AFRICAN AMERICAN: >60
GFR NON-AFRICAN AMERICAN: >60
GLOBULIN: 3 G/DL
GLUCOSE BLD-MCNC: 128 MG/DL (ref 70–99)
HCT VFR BLD CALC: 21.9 % (ref 40.5–52.5)
HEMOGLOBIN: 7.4 G/DL (ref 13.5–17.5)
LYMPHOCYTES ABSOLUTE: 0.9 K/UL (ref 1–5.1)
LYMPHOCYTES RELATIVE PERCENT: 12 %
MACROCYTES: ABNORMAL
MAGNESIUM: 1.9 MG/DL (ref 1.8–2.4)
MCH RBC QN AUTO: 34.4 PG (ref 26–34)
MCHC RBC AUTO-ENTMCNC: 34 G/DL (ref 31–36)
MCV RBC AUTO: 101.1 FL (ref 80–100)
MONOCYTES ABSOLUTE: 0.6 K/UL (ref 0–1.3)
MONOCYTES RELATIVE PERCENT: 9 %
NEUTROPHILS ABSOLUTE: 5.5 K/UL (ref 1.7–7.7)
NEUTROPHILS RELATIVE PERCENT: 77 %
OVALOCYTES: ABNORMAL
PDW BLD-RTO: 16.2 % (ref 12.4–15.4)
PLATELET # BLD: 98 K/UL (ref 135–450)
PMV BLD AUTO: 8.8 FL (ref 5–10.5)
POLYCHROMASIA: ABNORMAL
POTASSIUM REFLEX MAGNESIUM: 2.7 MMOL/L (ref 3.5–5.1)
RBC # BLD: 2.16 M/UL (ref 4.2–5.9)
SODIUM BLD-SCNC: 142 MMOL/L (ref 136–145)
TOTAL PROTEIN: 5.5 G/DL (ref 6.4–8.2)
WBC # BLD: 7.2 K/UL (ref 4–11)

## 2019-08-14 PROCEDURE — 2700000000 HC OXYGEN THERAPY PER DAY

## 2019-08-14 PROCEDURE — 36415 COLL VENOUS BLD VENIPUNCTURE: CPT

## 2019-08-14 PROCEDURE — 80053 COMPREHEN METABOLIC PANEL: CPT

## 2019-08-14 PROCEDURE — 82140 ASSAY OF AMMONIA: CPT

## 2019-08-14 PROCEDURE — 1200000000 HC SEMI PRIVATE

## 2019-08-14 PROCEDURE — 2580000003 HC RX 258: Performed by: NURSE PRACTITIONER

## 2019-08-14 PROCEDURE — APPSS30 APP SPLIT SHARED TIME 16-30 MINUTES: Performed by: PHYSICIAN ASSISTANT

## 2019-08-14 PROCEDURE — 99232 SBSQ HOSP IP/OBS MODERATE 35: CPT | Performed by: SURGERY

## 2019-08-14 PROCEDURE — APPNB30 APP NON BILLABLE TIME 0-30 MINS: Performed by: PHYSICIAN ASSISTANT

## 2019-08-14 PROCEDURE — 6360000002 HC RX W HCPCS: Performed by: NURSE PRACTITIONER

## 2019-08-14 PROCEDURE — 94760 N-INVAS EAR/PLS OXIMETRY 1: CPT

## 2019-08-14 PROCEDURE — 6370000000 HC RX 637 (ALT 250 FOR IP): Performed by: INTERNAL MEDICINE

## 2019-08-14 PROCEDURE — 83735 ASSAY OF MAGNESIUM: CPT

## 2019-08-14 PROCEDURE — 85025 COMPLETE CBC W/AUTO DIFF WBC: CPT

## 2019-08-14 RX ADMIN — ONDANSETRON 4 MG: 2 INJECTION INTRAMUSCULAR; INTRAVENOUS at 09:29

## 2019-08-14 RX ADMIN — Medication 10 MEQ: at 14:41

## 2019-08-14 RX ADMIN — NADOLOL 20 MG: 20 TABLET ORAL at 09:29

## 2019-08-14 RX ADMIN — SPIRONOLACTONE 50 MG: 25 TABLET ORAL at 09:00

## 2019-08-14 RX ADMIN — CEFEPIME HYDROCHLORIDE 2 G: 2 INJECTION, POWDER, FOR SOLUTION INTRAVENOUS at 15:52

## 2019-08-14 RX ADMIN — Medication 10 MEQ: at 09:28

## 2019-08-14 RX ADMIN — Medication 10 MEQ: at 10:47

## 2019-08-14 RX ADMIN — Medication 10 MEQ: at 12:13

## 2019-08-14 RX ADMIN — POTASSIUM CHLORIDE 10 MEQ: 750 TABLET, FILM COATED, EXTENDED RELEASE ORAL at 09:00

## 2019-08-14 RX ADMIN — RIFAXIMIN 550 MG: 550 TABLET ORAL at 09:00

## 2019-08-14 RX ADMIN — LACTULOSE 20 G: 20 SOLUTION ORAL at 09:29

## 2019-08-14 RX ADMIN — MICONAZOLE NITRATE: 20 POWDER TOPICAL at 22:17

## 2019-08-14 RX ADMIN — RIFAXIMIN 550 MG: 550 TABLET ORAL at 22:15

## 2019-08-14 RX ADMIN — Medication 1 TABLET: at 09:29

## 2019-08-14 RX ADMIN — SODIUM CHLORIDE, PRESERVATIVE FREE 10 ML: 5 INJECTION INTRAVENOUS at 09:29

## 2019-08-14 RX ADMIN — Medication 100 MG: at 09:29

## 2019-08-14 RX ADMIN — FOLIC ACID 1 MG: 1 TABLET ORAL at 09:29

## 2019-08-14 RX ADMIN — SODIUM CHLORIDE, PRESERVATIVE FREE 10 ML: 5 INJECTION INTRAVENOUS at 22:15

## 2019-08-14 RX ADMIN — DULOXETINE HYDROCHLORIDE 30 MG: 30 CAPSULE, DELAYED RELEASE ORAL at 09:29

## 2019-08-14 RX ADMIN — QUETIAPINE FUMARATE 12.5 MG: 25 TABLET ORAL at 22:15

## 2019-08-14 RX ADMIN — MICONAZOLE NITRATE: 20 POWDER TOPICAL at 09:29

## 2019-08-14 RX ADMIN — Medication 10 MEQ: at 13:37

## 2019-08-14 RX ADMIN — AMIODARONE HYDROCHLORIDE 200 MG: 200 TABLET ORAL at 09:29

## 2019-08-14 ASSESSMENT — PAIN SCALES - GENERAL
PAINLEVEL_OUTOF10: 0

## 2019-08-14 NOTE — PROGRESS NOTES
([facility] [address] [E44.0]    Dilatation of colon [K59.39]    Ileus ([address] [K56.7]    Cecal volvulus (HCC) [K56.2]    Altered mental status [R41.82]    Elevated lactic acid level [R79.89]    Abdominal pain [R10.9]    JAIME (acute kidney injury) ([facility] [address] [N17.9]    Hypokalemia [E87.6]    Urinary tract infection associated with cystostomy catheter ([address] [T83.510A, N39.0]    Thrombocytopenia (HCC) [D69.6]    Generalized weakness [R53.1]    Acute metabolic encephalopathy [X24.72]    Alcoholic cirrhosis ([facility] [address] [P80.73]    Dementia [F03.90]     1. Ileus, CT abdomen and pelvis without contrast showed a markedly abnormal dilated cecum appearing to be folded anteriorly. GS consulted, GI consulted, colonoscopy without ischemia or volvulus done during this admission, fluctuating distention and discomfort. Diet advanced by GS. had multiple BMs last night. 2. Thought to have UTI, likely colonization, on diflucan, received zyvox. 3. JAIME, improved.   4. Elevated LA, then improved.   5. Severe hypokalemia, still replacing per protocol.    6. Alcoholic cirrhosis, monitor for now. Restart spironolactone. 7. Pancytopenia, likely liver disease related, keeps worsening, no signs of active bleeding. 8. Acute encephalopathy, metabolic ,multofactorial, monitor for now, treatment as above.   9. Generalized weakness, as above.   10. Essential hypertension  11.  Bacteriuria, likely contamination, no antibiotics at this time      Diet: DIET CLEAR LIQUID;  Dietary Nutrition Supplements: Clear Liquid Oral Supplement  Code Status: Full Code    Babar Nichols MD

## 2019-08-14 NOTE — PLAN OF CARE
Problem: Falls - Risk of:  Goal: Will remain free from falls  Description  Will remain free from falls  8/13/2019 2301 by Del Zhu RN  Outcome: Ongoing     Problem: Falls - Risk of:  Goal: Absence of physical injury  Description  Absence of physical injury  8/13/2019 2301 by Del Zhu RN  Outcome: Ongoing     Problem: Risk for Impaired Skin Integrity  Goal: Tissue integrity - skin and mucous membranes  Description  Structural intactness and normal physiological function of skin and  mucous membranes.   8/13/2019 2301 by Del Zhu RN  Outcome: Ongoing     Problem: Health Behavior:  Goal: Identification of resources available to assist in meeting health care needs will improve  Description  Identification of resources available to assist in meeting health care needs will improve  8/13/2019 2301 by Del Zhu RN  Outcome: Ongoing     Problem: Self-Care:  Goal: Ability to participate in self-care as condition permits will improve  Description  Ability to participate in self-care as condition permits will improve  8/13/2019 2301 by Del Zhu RN  Outcome: Ongoing     Problem: Pain:  Goal: Pain level will decrease  Description  Pain level will decrease  8/13/2019 2301 by Del Zhu RN  Outcome: Ongoing     Problem: Pain:  Goal: Control of acute pain  Description  Control of acute pain  8/13/2019 2301 by Del Zhu RN  Outcome: Ongoing     Problem: Pain:  Goal: Control of chronic pain  Description  Control of chronic pain  8/13/2019 2301 by Del Zhu RN  Outcome: Ongoing     Problem: Nutrition  Goal: Optimal nutrition therapy  8/13/2019 2301 by Del Zhu RN  Outcome: Ongoing

## 2019-08-14 NOTE — PROGRESS NOTES
< > 7.4*   HCT  --    < > 21.9*   PLT  --    < > 98*   NA  --    < > 142   K  --    < > 2.7*   CL  --    < > 110   CO2  --    < > 21   BUN  --    < > 4*   CREATININE  --    < > 0.8   MG  --    < > 1.90   CALCIUM  --    < > 8.5   AST  --    < > 32   ALT  --    < > 16   BILITOT  --    < > 1.4*   NITRU POSITIVE*  --   --    COLORU YELLOW  --   --    BACTERIA 2+*  --   --     < > = values in this interval not displayed. CBC:   Lab Results   Component Value Date    WBC 7.2 08/14/2019    RBC 2.16 08/14/2019    HGB 7.4 08/14/2019    HCT 21.9 08/14/2019    .1 08/14/2019    MCH 34.4 08/14/2019    MCHC 34.0 08/14/2019    RDW 16.2 08/14/2019    PLT 98 08/14/2019    MPV 8.8 08/14/2019     CMP:    Lab Results   Component Value Date     08/14/2019    K 2.7 08/14/2019     08/14/2019    CO2 21 08/14/2019    BUN 4 08/14/2019    CREATININE 0.8 08/14/2019    GFRAA >60 08/14/2019    GFRAA >60 04/02/2013    AGRATIO 0.8 08/14/2019    LABGLOM >60 08/14/2019    GLUCOSE 128 08/14/2019    PROT 5.5 08/14/2019    LABALBU 2.5 08/14/2019    CALCIUM 8.5 08/14/2019    BILITOT 1.4 08/14/2019    ALKPHOS 85 08/14/2019    AST 32 08/14/2019    ALT 16 08/14/2019         Alysia Díaz PA-C  Electronically signed 8/14/2019 at 10:45 AM        Surgery Staff  I have examined this patient and read and agree with the note by Darien Ackerman PA-C from today. Multiple BMs but abdomen remains somewhat distended. Nontender. Advance diet as tolerated for slowly resolving ileus  Hypokalemia - replete  Urine cx with GNR. Would treat until sterile as this can be cause of ongoing ileus.   Will discuss with pharmacy given recent VRE  Electronically signed by Denzel Flynn MD on 8/14/2019 at 2:08 PM

## 2019-08-15 LAB
A/G RATIO: 0.7 (ref 1.1–2.2)
ALBUMIN SERPL-MCNC: 2.2 G/DL (ref 3.4–5)
ALP BLD-CCNC: 80 U/L (ref 40–129)
ALT SERPL-CCNC: 16 U/L (ref 10–40)
AMMONIA: 55 UMOL/L (ref 16–60)
ANION GAP SERPL CALCULATED.3IONS-SCNC: 10 MMOL/L (ref 3–16)
AST SERPL-CCNC: 30 U/L (ref 15–37)
BASOPHILS ABSOLUTE: 0 K/UL (ref 0–0.2)
BASOPHILS RELATIVE PERCENT: 0.5 %
BILIRUB SERPL-MCNC: 1.5 MG/DL (ref 0–1)
BUN BLDV-MCNC: 3 MG/DL (ref 7–20)
CALCIUM SERPL-MCNC: 8.2 MG/DL (ref 8.3–10.6)
CHLORIDE BLD-SCNC: 109 MMOL/L (ref 99–110)
CO2: 22 MMOL/L (ref 21–32)
CREAT SERPL-MCNC: 0.8 MG/DL (ref 0.8–1.3)
EOSINOPHILS ABSOLUTE: 0.2 K/UL (ref 0–0.6)
EOSINOPHILS RELATIVE PERCENT: 2.6 %
GFR AFRICAN AMERICAN: >60
GFR NON-AFRICAN AMERICAN: >60
GLOBULIN: 3.2 G/DL
GLUCOSE BLD-MCNC: 96 MG/DL (ref 70–99)
HCT VFR BLD CALC: 22.6 % (ref 40.5–52.5)
HEMOGLOBIN: 7.6 G/DL (ref 13.5–17.5)
LYMPHOCYTES ABSOLUTE: 1.7 K/UL (ref 1–5.1)
LYMPHOCYTES RELATIVE PERCENT: 20.8 %
MAGNESIUM: 1.7 MG/DL (ref 1.8–2.4)
MCH RBC QN AUTO: 34.2 PG (ref 26–34)
MCHC RBC AUTO-ENTMCNC: 33.6 G/DL (ref 31–36)
MCV RBC AUTO: 101.9 FL (ref 80–100)
MONOCYTES ABSOLUTE: 0.9 K/UL (ref 0–1.3)
MONOCYTES RELATIVE PERCENT: 11 %
NEUTROPHILS ABSOLUTE: 5.4 K/UL (ref 1.7–7.7)
NEUTROPHILS RELATIVE PERCENT: 65.1 %
ORGANISM: ABNORMAL
PDW BLD-RTO: 16.3 % (ref 12.4–15.4)
PLATELET # BLD: 108 K/UL (ref 135–450)
PMV BLD AUTO: 8.8 FL (ref 5–10.5)
POTASSIUM REFLEX MAGNESIUM: 3.2 MMOL/L (ref 3.5–5.1)
RBC # BLD: 2.21 M/UL (ref 4.2–5.9)
SODIUM BLD-SCNC: 141 MMOL/L (ref 136–145)
TOTAL PROTEIN: 5.4 G/DL (ref 6.4–8.2)
URINE CULTURE, ROUTINE: ABNORMAL
WBC # BLD: 8.2 K/UL (ref 4–11)

## 2019-08-15 PROCEDURE — 83735 ASSAY OF MAGNESIUM: CPT

## 2019-08-15 PROCEDURE — 99232 SBSQ HOSP IP/OBS MODERATE 35: CPT | Performed by: SURGERY

## 2019-08-15 PROCEDURE — 6370000000 HC RX 637 (ALT 250 FOR IP): Performed by: INTERNAL MEDICINE

## 2019-08-15 PROCEDURE — 2580000003 HC RX 258: Performed by: NURSE PRACTITIONER

## 2019-08-15 PROCEDURE — 97535 SELF CARE MNGMENT TRAINING: CPT

## 2019-08-15 PROCEDURE — 80053 COMPREHEN METABOLIC PANEL: CPT

## 2019-08-15 PROCEDURE — 85025 COMPLETE CBC W/AUTO DIFF WBC: CPT

## 2019-08-15 PROCEDURE — 6360000002 HC RX W HCPCS: Performed by: NURSE PRACTITIONER

## 2019-08-15 PROCEDURE — 94760 N-INVAS EAR/PLS OXIMETRY 1: CPT

## 2019-08-15 PROCEDURE — 1200000000 HC SEMI PRIVATE

## 2019-08-15 PROCEDURE — 82140 ASSAY OF AMMONIA: CPT

## 2019-08-15 PROCEDURE — APPSS30 APP SPLIT SHARED TIME 16-30 MINUTES: Performed by: NURSE PRACTITIONER

## 2019-08-15 PROCEDURE — 97530 THERAPEUTIC ACTIVITIES: CPT

## 2019-08-15 PROCEDURE — APPNB30 APP NON BILLABLE TIME 0-30 MINS: Performed by: NURSE PRACTITIONER

## 2019-08-15 PROCEDURE — 6360000002 HC RX W HCPCS: Performed by: INTERNAL MEDICINE

## 2019-08-15 PROCEDURE — 36415 COLL VENOUS BLD VENIPUNCTURE: CPT

## 2019-08-15 PROCEDURE — 6370000000 HC RX 637 (ALT 250 FOR IP): Performed by: NURSE PRACTITIONER

## 2019-08-15 RX ORDER — MAGNESIUM SULFATE IN WATER 40 MG/ML
2 INJECTION, SOLUTION INTRAVENOUS ONCE
Status: COMPLETED | OUTPATIENT
Start: 2019-08-15 | End: 2019-08-15

## 2019-08-15 RX ADMIN — Medication 1 TABLET: at 11:08

## 2019-08-15 RX ADMIN — SPIRONOLACTONE 50 MG: 25 TABLET ORAL at 11:08

## 2019-08-15 RX ADMIN — RIFAXIMIN 550 MG: 550 TABLET ORAL at 21:44

## 2019-08-15 RX ADMIN — Medication 100 MG: at 11:08

## 2019-08-15 RX ADMIN — CEFEPIME HYDROCHLORIDE 2 G: 2 INJECTION, POWDER, FOR SOLUTION INTRAVENOUS at 02:45

## 2019-08-15 RX ADMIN — LACTULOSE 20 G: 20 SOLUTION ORAL at 14:10

## 2019-08-15 RX ADMIN — MICONAZOLE NITRATE: 20 POWDER TOPICAL at 21:44

## 2019-08-15 RX ADMIN — MICONAZOLE NITRATE: 20 POWDER TOPICAL at 11:08

## 2019-08-15 RX ADMIN — SODIUM CHLORIDE, PRESERVATIVE FREE 10 ML: 5 INJECTION INTRAVENOUS at 11:09

## 2019-08-15 RX ADMIN — FOLIC ACID 1 MG: 1 TABLET ORAL at 11:08

## 2019-08-15 RX ADMIN — QUETIAPINE FUMARATE 12.5 MG: 25 TABLET ORAL at 21:44

## 2019-08-15 RX ADMIN — LACTULOSE 20 G: 20 SOLUTION ORAL at 21:44

## 2019-08-15 RX ADMIN — NADOLOL 20 MG: 20 TABLET ORAL at 11:08

## 2019-08-15 RX ADMIN — DULOXETINE HYDROCHLORIDE 30 MG: 30 CAPSULE, DELAYED RELEASE ORAL at 11:07

## 2019-08-15 RX ADMIN — AMIODARONE HYDROCHLORIDE 200 MG: 200 TABLET ORAL at 11:08

## 2019-08-15 RX ADMIN — CEFEPIME HYDROCHLORIDE 2 G: 2 INJECTION, POWDER, FOR SOLUTION INTRAVENOUS at 14:10

## 2019-08-15 RX ADMIN — RIFAXIMIN 550 MG: 550 TABLET ORAL at 11:07

## 2019-08-15 RX ADMIN — SODIUM CHLORIDE, PRESERVATIVE FREE 10 ML: 5 INJECTION INTRAVENOUS at 22:26

## 2019-08-15 RX ADMIN — MAGNESIUM SULFATE HEPTAHYDRATE 2 G: 40 INJECTION, SOLUTION INTRAVENOUS at 11:08

## 2019-08-15 RX ADMIN — POTASSIUM CHLORIDE 40 MEQ: 20 TABLET, EXTENDED RELEASE ORAL at 11:07

## 2019-08-15 RX ADMIN — POTASSIUM CHLORIDE 10 MEQ: 750 TABLET, FILM COATED, EXTENDED RELEASE ORAL at 11:08

## 2019-08-15 ASSESSMENT — PAIN SCALES - WONG BAKER
WONGBAKER_NUMERICALRESPONSE: 0

## 2019-08-15 ASSESSMENT — PAIN SCALES - GENERAL
PAINLEVEL_OUTOF10: 0

## 2019-08-15 NOTE — PROGRESS NOTES
swollen lymph nodes. · Allergic/Immunologic: No nasal congestion or hives. ·     Objective:     Patient Vitals for the past 8 hrs:   BP Temp Temp src Pulse Resp SpO2   08/15/19 1113 126/65 98.7 °F (37.1 °C) Oral 66 18 91 %   08/15/19 0730 133/75 98.5 °F (36.9 °C) Oral 68 18 93 %     I/O last 3 completed shifts:   In: 240 [P.O.:240]  Out: 65 [Urine:850]  I/O this shift:  In: 120 [P.O.:120]  Out: -     /65   Pulse 66   Temp 98.7 °F (37.1 °C) (Oral)   Resp 18   Ht 5' 6\" (1.676 m)   Wt 209 lb 7 oz (95 kg)   SpO2 91%   BMI 33.80 kg/m²     General Appearance:    Alert, cooperative, no distress, appears stated age   Head:    Normocephalic, without obvious abnormality, atraumatic   Eyes:    PERRL, conjunctiva/corneas clear, EOM's intact, fundi     benign, both eyes        Ears:    Normal TM's and external ear canals, both ears   Nose:   Nares normal, septum midline, mucosa normal, no drainage    or sinus tenderness   Throat:   Lips, mucosa, and tongue normal; teeth and gums normal   Neck:   Supple, symmetrical, trachea midline, no adenopathy;        thyroid:  No enlargement/tenderness/nodules; no carotid    bruit or JVD   Back:     Symmetric, no curvature, ROM normal, no CVA tenderness   Lungs:     Clear to auscultation bilaterally, respirations unlabored   Chest wall:    No tenderness or deformity   Heart:    Regular rate and rhythm, S1 and S2 normal, no murmur, rub   or gallop   Abdomen:     Soft, non-tender, bowel sounds active all four quadrants,     no masses, no organomegaly           Extremities:   Extremities normal, atraumatic, no cyanosis or edema   Pulses:   2+ and symmetric all extremities   Skin:   Skin color, texture, turgor normal, no rashes or lesions   Lymph nodes:   Cervical, supraclavicular, and axillary nodes normal   Neurologic:   Stable       Data Review  CBC:   Lab Results   Component Value Date    WBC 8.2 08/15/2019    RBC 2.21 08/15/2019       Assessment:     Principal Problem: Abdominal pain  Active Problems:    Acute metabolic encephalopathy    Alcoholic cirrhosis (HCC)    Dementia    Thrombocytopenia (HCC)    Hypokalemia    Urinary tract infection associated with cystostomy catheter (HCC)    Generalized weakness    JAIME (acute kidney injury) (HCC)    Elevated lactic acid level    Cecal volvulus (HCC)    Altered mental status    Dilatation of colon    Ileus (HCC)    Moderate malnutrition ([facility] [address]  Resolved Problems:    * No resolved hospital problems. *      Plan:     1. Pancytopenia. His white blood cell count has normalized and his platelet count is back at his baseline. 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 I would transfuse red blood cells as needed. 2.  Hepatic encephalopathy.   This is per the primary team.        Electronically signed by Matthew Delgado MD on 8/15/2019 at 1:53 PM

## 2019-08-15 NOTE — PLAN OF CARE
Patient is alert and oriented to self, up with assist x2 with steady, call light within reach. Fall precautions in place. AM meds complete, patient tolerated well. VSS and WDL. No s/s of distress, no further needs noted at this time.  Electronically signed by Karen Veronica RN on 8/15/2019 at 1:02 PM

## 2019-08-15 NOTE — PROGRESS NOTES
Occupational Therapy  Facility/Department: [address] PROGRESSIVE CARE  Daily Treatment Note  NAME: Mason Mendes  : 1950  MRN: [id number]    Date of Service: 8/15/2019    Discharge Recommendations:  Patient would benefit from continued therapy after discharge, 3-5 sessions per week  OT Equipment Recommendations  Other: TBD by next LOC  Mason Mendes scored a 15/24 on the AM-PAC ADL Inpatient form. Current research shows that an AM-PAC score of 17 or less is typically not associated with a discharge to the patient's home setting. Based on the patients AM-PAC score and their current ADL deficits, it is recommended that the patient have 3-5 sessions per week of Occupational Therapy at d/c to increase the patients independence. Assessment   Performance deficits / Impairments: Decreased functional mobility ; Decreased safe awareness;Decreased ADL status; Decreased cognition;Decreased balance;Decreased endurance  Assessment: Pt tolerated tx session fair this date, continues to be limited by decreased activity tolerance, decreased strength/endurance, decreased cognition. Pt completed bed mobility with SBA. Pt completed functional transfers and mobility with min A, using RW, and max cueing for safety. Pt performed seated grooming with setup. Continue per OT POC  Prognosis: Fair;Good  OT Education: OT Role;Plan of Care;Transfer Training;Orientation  Barriers to Learning: cognition  REQUIRES OT FOLLOW UP: Yes  Activity Tolerance  Activity Tolerance: Treatment limited secondary to decreased cognition;Patient limited by fatigue  Safety Devices  Safety Devices in place: Yes  Type of devices: Left in chair;Gait belt;Call light within reach;Nurse notified; Chair alarm in place; Patient at risk for falls(ASHISH Rivera)         Patient Diagnosis(es): The primary encounter diagnosis was Cecal volvulus ([facility] [address]  Diagnoses of Alcoholic cirrhosis of liver without ascites ([facility] [address] Altered mental status, unspecified altered with PT. Pt agreeable to therapy, denies pain  General Comment  Comments: RN cleared pt for therapy      Orientation  Orientation  Orientation Level: Oriented to person;Disoriented to place; Disoriented to time;Disoriented to situation(pt states he is at [facility] [employer] [facility] informed he is at hospital continues to state \"no, this is [employer]\")  Objective    ADL  Grooming: Setup(To wash face, perform oral care seated in recliner)        Balance  Sitting Balance: Stand by assistance  Standing Balance: Contact guard assistance  Functional Mobility  Functional - Mobility Device: Rolling Walker  Activity: (bed > chair)  Assist Level: Minimal assistance  Functional Mobility Comments: Pt performed functional mobility from bed > chair with RW. Min assist d/t pt requiring max verbal and tactile assistance for safe RW placement  Bed mobility  Supine to Sit: Stand by assistance(HOB elevated)  Scooting: Minimal assistance  Transfers  Sit to stand: Contact guard assistance  Stand to sit: Minimal assistance  Transfer Comments: to/from RW. Max cueing for safety                       Cognition  Overall Cognitive Status: Exceptions  Arousal/Alertness: Appropriate responses to stimuli  Following Commands: Follows one step commands with increased time; Follows one step commands with repetition  Attention Span: Attends with cues to redirect  Memory: Decreased recall of recent events;Decreased short term memory  Safety Judgement: Decreased awareness of need for safety  Insights: Decreased awareness of deficits  Initiation: Requires cues for some  Sequencing: Requires cues for some  Cognition Comment: Baseline dementia                                         Plan   Plan  Times per week: 3-5  Times per day: Daily  Current Treatment Recommendations: Strengthening, Functional Mobility Training, Endurance Training, Balance Training, Safety Education & Training, Self-Care / ADL, Cognitive Reorientation, Neuromuscular

## 2019-08-16 LAB
A/G RATIO: 0.8 (ref 1.1–2.2)
ALBUMIN SERPL-MCNC: 2.3 G/DL (ref 3.4–5)
ALP BLD-CCNC: 94 U/L (ref 40–129)
ALT SERPL-CCNC: 17 U/L (ref 10–40)
AMMONIA: 69 UMOL/L (ref 16–60)
ANION GAP SERPL CALCULATED.3IONS-SCNC: 11 MMOL/L (ref 3–16)
AST SERPL-CCNC: 36 U/L (ref 15–37)
BASOPHILS ABSOLUTE: 0 K/UL (ref 0–0.2)
BASOPHILS RELATIVE PERCENT: 0.7 %
BILIRUB SERPL-MCNC: 1.6 MG/DL (ref 0–1)
BUN BLDV-MCNC: 4 MG/DL (ref 7–20)
CALCIUM SERPL-MCNC: 8.3 MG/DL (ref 8.3–10.6)
CHLORIDE BLD-SCNC: 105 MMOL/L (ref 99–110)
CO2: 22 MMOL/L (ref 21–32)
CREAT SERPL-MCNC: 0.8 MG/DL (ref 0.8–1.3)
EOSINOPHILS ABSOLUTE: 0.2 K/UL (ref 0–0.6)
EOSINOPHILS RELATIVE PERCENT: 2.6 %
GFR AFRICAN AMERICAN: >60
GFR NON-AFRICAN AMERICAN: >60
GLOBULIN: 3 G/DL
GLUCOSE BLD-MCNC: 105 MG/DL (ref 70–99)
HCT VFR BLD CALC: 23.1 % (ref 40.5–52.5)
HEMOGLOBIN: 7.8 G/DL (ref 13.5–17.5)
LYMPHOCYTES ABSOLUTE: 1.7 K/UL (ref 1–5.1)
LYMPHOCYTES RELATIVE PERCENT: 23.7 %
MAGNESIUM: 1.9 MG/DL (ref 1.8–2.4)
MCH RBC QN AUTO: 34.2 PG (ref 26–34)
MCHC RBC AUTO-ENTMCNC: 33.8 G/DL (ref 31–36)
MCV RBC AUTO: 101 FL (ref 80–100)
MONOCYTES ABSOLUTE: 0.8 K/UL (ref 0–1.3)
MONOCYTES RELATIVE PERCENT: 12.1 %
NEUTROPHILS ABSOLUTE: 4.3 K/UL (ref 1.7–7.7)
NEUTROPHILS RELATIVE PERCENT: 60.9 %
PDW BLD-RTO: 16.9 % (ref 12.4–15.4)
PLATELET # BLD: 104 K/UL (ref 135–450)
PMV BLD AUTO: 8.5 FL (ref 5–10.5)
POTASSIUM REFLEX MAGNESIUM: 2.9 MMOL/L (ref 3.5–5.1)
POTASSIUM SERPL-SCNC: 3.1 MMOL/L (ref 3.5–5.1)
RBC # BLD: 2.29 M/UL (ref 4.2–5.9)
SODIUM BLD-SCNC: 138 MMOL/L (ref 136–145)
TOTAL PROTEIN: 5.3 G/DL (ref 6.4–8.2)
WBC # BLD: 7 K/UL (ref 4–11)

## 2019-08-16 PROCEDURE — 97535 SELF CARE MNGMENT TRAINING: CPT

## 2019-08-16 PROCEDURE — 99232 SBSQ HOSP IP/OBS MODERATE 35: CPT | Performed by: SURGERY

## 2019-08-16 PROCEDURE — 97110 THERAPEUTIC EXERCISES: CPT

## 2019-08-16 PROCEDURE — 6360000002 HC RX W HCPCS: Performed by: NURSE PRACTITIONER

## 2019-08-16 PROCEDURE — 97530 THERAPEUTIC ACTIVITIES: CPT

## 2019-08-16 PROCEDURE — 85025 COMPLETE CBC W/AUTO DIFF WBC: CPT

## 2019-08-16 PROCEDURE — 80053 COMPREHEN METABOLIC PANEL: CPT

## 2019-08-16 PROCEDURE — 36415 COLL VENOUS BLD VENIPUNCTURE: CPT

## 2019-08-16 PROCEDURE — 83735 ASSAY OF MAGNESIUM: CPT

## 2019-08-16 PROCEDURE — 1200000000 HC SEMI PRIVATE

## 2019-08-16 PROCEDURE — 94760 N-INVAS EAR/PLS OXIMETRY 1: CPT

## 2019-08-16 PROCEDURE — 99232 SBSQ HOSP IP/OBS MODERATE 35: CPT | Performed by: INTERNAL MEDICINE

## 2019-08-16 PROCEDURE — 84132 ASSAY OF SERUM POTASSIUM: CPT

## 2019-08-16 PROCEDURE — 82140 ASSAY OF AMMONIA: CPT

## 2019-08-16 PROCEDURE — 2580000003 HC RX 258: Performed by: NURSE PRACTITIONER

## 2019-08-16 PROCEDURE — 6370000000 HC RX 637 (ALT 250 FOR IP): Performed by: INTERNAL MEDICINE

## 2019-08-16 RX ORDER — SODIUM CHLORIDE, SODIUM LACTATE, POTASSIUM CHLORIDE, CALCIUM CHLORIDE 600; 310; 30; 20 MG/100ML; MG/100ML; MG/100ML; MG/100ML
INJECTION, SOLUTION INTRAVENOUS CONTINUOUS
Status: DISCONTINUED | OUTPATIENT
Start: 2019-08-17 | End: 2019-08-19

## 2019-08-16 RX ADMIN — MICONAZOLE NITRATE: 20 POWDER TOPICAL at 08:02

## 2019-08-16 RX ADMIN — NADOLOL 20 MG: 20 TABLET ORAL at 08:01

## 2019-08-16 RX ADMIN — Medication 100 MG: at 08:01

## 2019-08-16 RX ADMIN — LACTULOSE 20 G: 20 SOLUTION ORAL at 14:11

## 2019-08-16 RX ADMIN — Medication 10 MEQ: at 07:58

## 2019-08-16 RX ADMIN — SODIUM CHLORIDE, PRESERVATIVE FREE 10 ML: 5 INJECTION INTRAVENOUS at 21:34

## 2019-08-16 RX ADMIN — DULOXETINE HYDROCHLORIDE 30 MG: 30 CAPSULE, DELAYED RELEASE ORAL at 08:01

## 2019-08-16 RX ADMIN — Medication 10 MEQ: at 09:06

## 2019-08-16 RX ADMIN — Medication 10 MEQ: at 10:16

## 2019-08-16 RX ADMIN — CEFEPIME HYDROCHLORIDE 2 G: 2 INJECTION, POWDER, FOR SOLUTION INTRAVENOUS at 02:35

## 2019-08-16 RX ADMIN — Medication 10 MEQ: at 13:41

## 2019-08-16 RX ADMIN — RIFAXIMIN 550 MG: 550 TABLET ORAL at 08:01

## 2019-08-16 RX ADMIN — RIFAXIMIN 550 MG: 550 TABLET ORAL at 21:24

## 2019-08-16 RX ADMIN — POTASSIUM CHLORIDE 10 MEQ: 750 TABLET, FILM COATED, EXTENDED RELEASE ORAL at 08:01

## 2019-08-16 RX ADMIN — Medication 10 MEQ: at 11:51

## 2019-08-16 RX ADMIN — Medication 1 TABLET: at 08:01

## 2019-08-16 RX ADMIN — Medication 10 MEQ: at 15:49

## 2019-08-16 RX ADMIN — LACTULOSE 20 G: 20 SOLUTION ORAL at 21:24

## 2019-08-16 RX ADMIN — LACTULOSE 20 G: 20 SOLUTION ORAL at 08:01

## 2019-08-16 RX ADMIN — FOLIC ACID 1 MG: 1 TABLET ORAL at 08:01

## 2019-08-16 RX ADMIN — SPIRONOLACTONE 50 MG: 25 TABLET ORAL at 08:01

## 2019-08-16 RX ADMIN — QUETIAPINE FUMARATE 12.5 MG: 25 TABLET ORAL at 21:24

## 2019-08-16 RX ADMIN — AMIODARONE HYDROCHLORIDE 200 MG: 200 TABLET ORAL at 08:01

## 2019-08-16 RX ADMIN — CEFEPIME HYDROCHLORIDE 2 G: 2 INJECTION, POWDER, FOR SOLUTION INTRAVENOUS at 14:46

## 2019-08-16 RX ADMIN — SODIUM CHLORIDE, PRESERVATIVE FREE 10 ML: 5 INJECTION INTRAVENOUS at 08:02

## 2019-08-16 ASSESSMENT — PAIN SCALES - PAIN ASSESSMENT IN ADVANCED DEMENTIA (PAINAD)
BREATHING: 0
NEGVOCALIZATION: 2
TOTALSCORE: 4
BODYLANGUAGE: 0
CONSOLABILITY: 1
FACIALEXPRESSION: 1

## 2019-08-16 ASSESSMENT — PAIN SCALES - WONG BAKER
WONGBAKER_NUMERICALRESPONSE: 0
WONGBAKER_NUMERICALRESPONSE: 0
WONGBAKER_NUMERICALRESPONSE: 2
WONGBAKER_NUMERICALRESPONSE: 0

## 2019-08-16 ASSESSMENT — PAIN DESCRIPTION - ORIENTATION: ORIENTATION: RIGHT

## 2019-08-16 ASSESSMENT — PAIN SCALES - GENERAL
PAINLEVEL_OUTOF10: 0
PAINLEVEL_OUTOF10: 4

## 2019-08-16 ASSESSMENT — PAIN DESCRIPTION - PROGRESSION: CLINICAL_PROGRESSION: GRADUALLY WORSENING

## 2019-08-16 ASSESSMENT — PAIN DESCRIPTION - ONSET: ONSET: SUDDEN

## 2019-08-16 ASSESSMENT — PAIN - FUNCTIONAL ASSESSMENT: PAIN_FUNCTIONAL_ASSESSMENT: ACTIVITIES ARE NOT PREVENTED

## 2019-08-16 ASSESSMENT — PAIN DESCRIPTION - PAIN TYPE: TYPE: ACUTE PAIN

## 2019-08-16 ASSESSMENT — PAIN DESCRIPTION - FREQUENCY: FREQUENCY: INTERMITTENT

## 2019-08-16 ASSESSMENT — PAIN DESCRIPTION - LOCATION: LOCATION: HAND

## 2019-08-16 ASSESSMENT — PAIN DESCRIPTION - DESCRIPTORS: DESCRIPTORS: ACHING

## 2019-08-16 NOTE — PROGRESS NOTES
Exceptions  Arousal/Alertness: Appropriate responses to stimuli  Following Commands: Follows one step commands with increased time; Follows one step commands with repetition  Attention Span: Attends with cues to redirect  Memory: Decreased recall of recent events;Decreased short term memory  Safety Judgement: Decreased awareness of need for safety  Insights: Decreased awareness of deficits  Initiation: Requires cues for some  Sequencing: Requires cues for some  Cognition Comment: Baseline dementia                    Type of ROM/Therapeutic Exercise  Type of ROM/Therapeutic Exercise: AROM(to B UEs, tolerated well)                  Patient left in bed with bed alarm, needs in reach. Nursing informed. Telesitter in room. Plan   Plan  Times per week: 3-5  Times per day: Daily  Current Treatment Recommendations: Functional Mobility Training, Endurance Training, Balance Training, Safety Education & Training, Self-Care / ADL, Cognitive Reorientation, Patient/Caregiver Education & Training     OutComes Score    Kemi Valiente scored a 15/24 on the [facility] ADL Inpatient form. Current research shows that an -PAC score of 17 or less is typically not associated with a discharge to the patient's home setting. Based on the patients AM-PAC score and their current ADL deficits, it is recommended that the patient have 3-5 sessions per week of Occupational Therapy at d/c to increase the patients independence.                                                  AM-PAC Score        AM-[facility] Inpatient Daily Activity Raw Score: 15 (08/16/19 1227)  AM-PAC Inpatient ADL T-Scale Score : 34.69 (08/16/19 1227)  ADL Inpatient CMS 0-100% Score: 56.46 (08/16/19 1227)  ADL Inpatient CMS G-Code Modifier : CK (08/16/19 1227)    Goals  Short term goals  Time Frame for Short term goals: Prior to d/c--goals ongoing 8/16/19  Short term goal 1: Pt will perform functional transfers to/from ADL surfaces with supervision  Short term goal 2: Pt will groom with supervision  Short term goal 3: Pt will toilet with min A  Short term goal 4: Pt will perform UB bathing with supervision  Short term goal 5: Pt will increase strength/endurance to achieve the above goals  Long term goals  Time Frame for Long term goals : LTG=STG  Patient Goals   Patient goals : None stated       Therapy Time   Individual Concurrent Group Co-treatment   Time In 1145         Time Out 1227         Minutes Anna Marie Rocha

## 2019-08-16 NOTE — PROGRESS NOTES
10; 15         Timed Code Treatment Minutes: [facility] Purcell Angelucci, PT    Electronically signed by Purcell Angelucci, PT [id number] on 8/16/2019 at 10:03 AM

## 2019-08-17 LAB
A/G RATIO: 0.8 (ref 1.1–2.2)
ALBUMIN SERPL-MCNC: 2.3 G/DL (ref 3.4–5)
ALP BLD-CCNC: 94 U/L (ref 40–129)
ALT SERPL-CCNC: 15 U/L (ref 10–40)
AMMONIA: 71 UMOL/L (ref 16–60)
ANION GAP SERPL CALCULATED.3IONS-SCNC: 8 MMOL/L (ref 3–16)
AST SERPL-CCNC: 28 U/L (ref 15–37)
BASOPHILS ABSOLUTE: 0 K/UL (ref 0–0.2)
BASOPHILS RELATIVE PERCENT: 0.7 %
BILIRUB SERPL-MCNC: 1.4 MG/DL (ref 0–1)
BUN BLDV-MCNC: 4 MG/DL (ref 7–20)
CALCIUM SERPL-MCNC: 8.3 MG/DL (ref 8.3–10.6)
CHLORIDE BLD-SCNC: 110 MMOL/L (ref 99–110)
CO2: 23 MMOL/L (ref 21–32)
CREAT SERPL-MCNC: 0.7 MG/DL (ref 0.8–1.3)
EOSINOPHILS ABSOLUTE: 0.2 K/UL (ref 0–0.6)
EOSINOPHILS RELATIVE PERCENT: 3.8 %
GFR AFRICAN AMERICAN: >60
GFR NON-AFRICAN AMERICAN: >60
GLOBULIN: 2.9 G/DL
GLUCOSE BLD-MCNC: 119 MG/DL (ref 70–99)
HCT VFR BLD CALC: 21.6 % (ref 40.5–52.5)
HEMOGLOBIN: 7.2 G/DL (ref 13.5–17.5)
LYMPHOCYTES ABSOLUTE: 1.3 K/UL (ref 1–5.1)
LYMPHOCYTES RELATIVE PERCENT: 28.9 %
MAGNESIUM: 1.7 MG/DL (ref 1.8–2.4)
MCH RBC QN AUTO: 33.8 PG (ref 26–34)
MCHC RBC AUTO-ENTMCNC: 33.4 G/DL (ref 31–36)
MCV RBC AUTO: 101.2 FL (ref 80–100)
MONOCYTES ABSOLUTE: 0.6 K/UL (ref 0–1.3)
MONOCYTES RELATIVE PERCENT: 13.3 %
NEUTROPHILS ABSOLUTE: 2.5 K/UL (ref 1.7–7.7)
NEUTROPHILS RELATIVE PERCENT: 53.3 %
PDW BLD-RTO: 18 % (ref 12.4–15.4)
PLATELET # BLD: 93 K/UL (ref 135–450)
PLATELET SLIDE REVIEW: ABNORMAL
PMV BLD AUTO: 8.6 FL (ref 5–10.5)
POTASSIUM REFLEX MAGNESIUM: 3 MMOL/L (ref 3.5–5.1)
RBC # BLD: 2.14 M/UL (ref 4.2–5.9)
SLIDE REVIEW: ABNORMAL
SODIUM BLD-SCNC: 141 MMOL/L (ref 136–145)
TOTAL PROTEIN: 5.2 G/DL (ref 6.4–8.2)
WBC # BLD: 4.7 K/UL (ref 4–11)

## 2019-08-17 PROCEDURE — 83735 ASSAY OF MAGNESIUM: CPT

## 2019-08-17 PROCEDURE — 6370000000 HC RX 637 (ALT 250 FOR IP): Performed by: INTERNAL MEDICINE

## 2019-08-17 PROCEDURE — 6360000002 HC RX W HCPCS: Performed by: INTERNAL MEDICINE

## 2019-08-17 PROCEDURE — 6360000002 HC RX W HCPCS: Performed by: NURSE PRACTITIONER

## 2019-08-17 PROCEDURE — 99232 SBSQ HOSP IP/OBS MODERATE 35: CPT | Performed by: INTERNAL MEDICINE

## 2019-08-17 PROCEDURE — 36415 COLL VENOUS BLD VENIPUNCTURE: CPT

## 2019-08-17 PROCEDURE — 82140 ASSAY OF AMMONIA: CPT

## 2019-08-17 PROCEDURE — 87040 BLOOD CULTURE FOR BACTERIA: CPT

## 2019-08-17 PROCEDURE — 1200000000 HC SEMI PRIVATE

## 2019-08-17 PROCEDURE — 94760 N-INVAS EAR/PLS OXIMETRY 1: CPT

## 2019-08-17 PROCEDURE — 85025 COMPLETE CBC W/AUTO DIFF WBC: CPT

## 2019-08-17 PROCEDURE — 6370000000 HC RX 637 (ALT 250 FOR IP): Performed by: NURSE PRACTITIONER

## 2019-08-17 PROCEDURE — 80053 COMPREHEN METABOLIC PANEL: CPT

## 2019-08-17 PROCEDURE — 2580000003 HC RX 258: Performed by: NURSE PRACTITIONER

## 2019-08-17 RX ORDER — POTASSIUM CHLORIDE 750 MG/1
40 TABLET, FILM COATED, EXTENDED RELEASE ORAL 2 TIMES DAILY
Status: DISCONTINUED | OUTPATIENT
Start: 2019-08-17 | End: 2019-08-17

## 2019-08-17 RX ORDER — MAGNESIUM SULFATE IN WATER 40 MG/ML
2 INJECTION, SOLUTION INTRAVENOUS ONCE
Status: COMPLETED | OUTPATIENT
Start: 2019-08-17 | End: 2019-08-17

## 2019-08-17 RX ORDER — POTASSIUM CHLORIDE 7.45 MG/ML
10 INJECTION INTRAVENOUS
Status: COMPLETED | OUTPATIENT
Start: 2019-08-17 | End: 2019-08-17

## 2019-08-17 RX ORDER — POTASSIUM CHLORIDE 750 MG/1
40 TABLET, FILM COATED, EXTENDED RELEASE ORAL DAILY
Status: DISCONTINUED | OUTPATIENT
Start: 2019-08-17 | End: 2019-08-19 | Stop reason: HOSPADM

## 2019-08-17 RX ORDER — IBUPROFEN 600 MG/1
600 TABLET ORAL ONCE
Status: COMPLETED | OUTPATIENT
Start: 2019-08-17 | End: 2019-08-17

## 2019-08-17 RX ADMIN — Medication 10 MEQ: at 07:31

## 2019-08-17 RX ADMIN — MICONAZOLE NITRATE: 20 POWDER TOPICAL at 20:55

## 2019-08-17 RX ADMIN — IBUPROFEN 600 MG: 600 TABLET ORAL at 00:10

## 2019-08-17 RX ADMIN — MEROPENEM 500 MG: 500 INJECTION, POWDER, FOR SOLUTION INTRAVENOUS at 12:45

## 2019-08-17 RX ADMIN — POTASSIUM CHLORIDE 40 MEQ: 750 TABLET, EXTENDED RELEASE ORAL at 09:42

## 2019-08-17 RX ADMIN — FOLIC ACID 1 MG: 1 TABLET ORAL at 07:31

## 2019-08-17 RX ADMIN — SPIRONOLACTONE 50 MG: 25 TABLET ORAL at 07:31

## 2019-08-17 RX ADMIN — LACTULOSE 20 G: 20 SOLUTION ORAL at 07:31

## 2019-08-17 RX ADMIN — LACTULOSE 20 G: 20 SOLUTION ORAL at 12:45

## 2019-08-17 RX ADMIN — MEROPENEM 500 MG: 500 INJECTION, POWDER, FOR SOLUTION INTRAVENOUS at 06:25

## 2019-08-17 RX ADMIN — QUETIAPINE FUMARATE 12.5 MG: 25 TABLET ORAL at 20:52

## 2019-08-17 RX ADMIN — Medication 10 MEQ: at 11:35

## 2019-08-17 RX ADMIN — DULOXETINE HYDROCHLORIDE 30 MG: 30 CAPSULE, DELAYED RELEASE ORAL at 07:31

## 2019-08-17 RX ADMIN — SODIUM CHLORIDE, POTASSIUM CHLORIDE, SODIUM LACTATE AND CALCIUM CHLORIDE: 600; 310; 30; 20 INJECTION, SOLUTION INTRAVENOUS at 02:50

## 2019-08-17 RX ADMIN — POTASSIUM CHLORIDE 10 MEQ: 750 TABLET, FILM COATED, EXTENDED RELEASE ORAL at 07:31

## 2019-08-17 RX ADMIN — Medication 1 TABLET: at 07:34

## 2019-08-17 RX ADMIN — MEROPENEM 500 MG: 500 INJECTION, POWDER, FOR SOLUTION INTRAVENOUS at 18:52

## 2019-08-17 RX ADMIN — AMIODARONE HYDROCHLORIDE 200 MG: 200 TABLET ORAL at 07:31

## 2019-08-17 RX ADMIN — LACTULOSE 20 G: 20 SOLUTION ORAL at 20:46

## 2019-08-17 RX ADMIN — RIFAXIMIN 550 MG: 550 TABLET ORAL at 20:54

## 2019-08-17 RX ADMIN — Medication 100 MG: at 07:31

## 2019-08-17 RX ADMIN — SODIUM CHLORIDE, POTASSIUM CHLORIDE, SODIUM LACTATE AND CALCIUM CHLORIDE: 600; 310; 30; 20 INJECTION, SOLUTION INTRAVENOUS at 18:52

## 2019-08-17 RX ADMIN — NADOLOL 20 MG: 20 TABLET ORAL at 07:34

## 2019-08-17 RX ADMIN — Medication 10 MEQ: at 09:42

## 2019-08-17 RX ADMIN — RIFAXIMIN 550 MG: 550 TABLET ORAL at 07:31

## 2019-08-17 RX ADMIN — MICONAZOLE NITRATE: 20 POWDER TOPICAL at 07:32

## 2019-08-17 RX ADMIN — MAGNESIUM SULFATE HEPTAHYDRATE 2 G: 40 INJECTION, SOLUTION INTRAVENOUS at 09:46

## 2019-08-17 RX ADMIN — MEROPENEM 500 MG: 500 INJECTION, POWDER, FOR SOLUTION INTRAVENOUS at 00:11

## 2019-08-17 RX ADMIN — ENOXAPARIN SODIUM 40 MG: 40 INJECTION SUBCUTANEOUS at 09:41

## 2019-08-17 ASSESSMENT — PAIN SCALES - PAIN ASSESSMENT IN ADVANCED DEMENTIA (PAINAD)
NEGVOCALIZATION: 0
TOTALSCORE: 0
CONSOLABILITY: 0
BODYLANGUAGE: 0
BREATHING: 0
FACIALEXPRESSION: 0

## 2019-08-17 ASSESSMENT — PAIN SCALES - WONG BAKER

## 2019-08-17 ASSESSMENT — PAIN SCALES - GENERAL
PAINLEVEL_OUTOF10: 0

## 2019-08-17 NOTE — PROGRESS NOTES
Result   Stable portable study. Assessment/Plan:    Active Hospital Problems    Diagnosis    Moderate malnutrition ([facility] [address] [E44.0]    Dilatation of colon [K59.39]    Ileus ([address] [K56.7]    Cecal volvulus (HCC) [K56.2]    Altered mental status [R41.82]    Elevated lactic acid level [R79.89]    Abdominal pain [R10.9]    JAIME (acute kidney injury) ([facility] [address] [N17.9]    Hypokalemia [E87.6]    Urinary tract infection associated with cystostomy catheter ([facility] [address] [T83.510A, N39.0]    Thrombocytopenia (HCC) [D69.6]    Generalized weakness [R53.1]    Acute metabolic encephalopathy [O45.35]    Alcoholic cirrhosis ([facility] [address] [F73.89]    Dementia [F03.90]     1. Ileus, CT abdomen and pelvis without contrast showed a markedly abnormal dilated cecum appearing to be folded anteriorly. GS consulted, GI consulted, colonoscopy without ischemia or volvulus done during this admission, fluctuating distention and discomfort. feels better. tolerating po intake. 2. Thought to have UTI, likely colonization, asymptomatic, no leukocytosis, but had fever last night, unkown origin. consulted ID, impiric meropenem at this time   3. prolonged QT, replacing potassium and magnesium, patient on amiodarone. 4. Hypokalemia, iv and po supplements. 5. Hypomagnesemia, will replace with iv supplements   6. Alcoholic cirrhosis, monitor for now. Restart spironolactone. 7. Pancytopenia, likely liver disease related, no signs of active bleeding. hematology consulted. 8. Acute encephalopathy, metabolic, multofactorial, some worsening noted this am treatment as above.   9. Generalized weakness, as above.   10. Essential hypertension  11. Elevated LA, then improved.     DVT Prophylaxis: scd/lovenox  Diet: DIET GENERAL;  Dietary Nutrition Supplements: Frozen Oral Supplement  Code Status: Full Code        Dispo - ongoing management     Lovely Jang MD

## 2019-08-17 NOTE — PLAN OF CARE
Problem: Falls - Risk of:  Goal: Will remain free from falls  Description  Will remain free from falls  Outcome: Ongoing  Note:   Will remain free from falls. Problem: Risk for Impaired Skin Integrity  Goal: Tissue integrity - skin and mucous membranes  Description  Structural intactness and normal physiological function of skin and  mucous membranes. Outcome: Ongoing  Note:   Skin will heal; will remain free from further skin breakdown. Problem: Pain:  Goal: Pain level will decrease  Description  Pain level will decrease  Outcome: Ongoing  Note:   Pain level will decrease. Problem: Nutrition  Goal: Optimal nutrition therapy  Outcome: Ongoing  Note:   Will maintain adequate nutrition intake.

## 2019-08-17 NOTE — PROGRESS NOTES
Infectious Disease Follow up Notes    CC :  Fever, catheter associated UTI      Antibiotics:   Meropenem 500 q6    Admit Date:   8/1/2019  Hospital Day: 17    Subjective:   No recurrence of fever since midnight last night. Uneventful day. Patient without specific complaints     Objective:     Patient Vitals for the past 8 hrs:   BP Temp Temp src Pulse Resp SpO2   08/17/19 1507 136/71 97.6 °F (36.4 °C) Axillary 65 16 96 %   08/17/19 1131 (!) 128/57 98.7 °F (37.1 °C) Axillary 70 16 --       EXAM:  General:  Sleeping very deeply, rousable. Obese. No acute distress    HEENT:  PERRL, sclera anicteric. MMM    LUNGS:  Upper lobes clear. No increased work of breathing    CV:  RRR  ABD: Soft, flat, NT.    SPT exit site without erythema or exudate     EXT: Stasis changes, trace LE edema.   No focal rash    LUE edema > RUE       LINE: PIV R forearm site ok        Scheduled Meds:   potassium chloride  40 mEq Oral Daily    enoxaparin  40 mg Subcutaneous Daily    meropenem  500 mg Intravenous Q6H    spironolactone  50 mg Oral Daily    QUEtiapine  12.5 mg Oral Nightly    lactulose  20 g Oral TID    amiodarone  200 mg Oral Daily    DULoxetine  30 mg Oral Daily    folic acid  1 mg Oral Daily    miconazole   Topical BID    nadolol  20 mg Oral Daily    rifaximin  550 mg Oral BID    vitamin B-1  100 mg Oral Daily    magnesium cl-calcium carbonate  1 tablet Oral Daily    lidocaine 1 % injection  5 mL Intradermal Once    sodium chloride flush  10 mL Intravenous 2 times per day       Continuous Infusions:   lactated ringers Stopped (08/17/19 0630)          Data Review:    Lab Results   Component Value Date    WBC 4.7 08/17/2019    HGB 7.2 (L) 08/17/2019    HCT 21.6 (L) 08/17/2019    .2 (H) 08/17/2019    PLT 93 (L) 08/17/2019     Lab Results   Component Value Date    CREATININE 0.7 (L) 08/17/2019    BUN 4 (L) 08/17/2019

## 2019-08-17 NOTE — PROGRESS NOTES
Call from Dalila Holm stating pt has prolonged QT interval of up to 0.53; per [facility] pt has been having prolonged QT around 0.5. Notified on call NP. Informed that this is pt's baseline. Will continue to monitor.      Electronically signed by Nika Rodrigues RN on 8/16/2019 at 10:08 PM

## 2019-08-17 NOTE — PROGRESS NOTES
Pt's temp taken at 102.2. On call NP notified. Awaiting med orders to reduce fever.      Electronically signed by Marcos Esparza RN on 8/16/2019 at 11:57 PM

## 2019-08-18 LAB
A/G RATIO: 0.6 (ref 1.1–2.2)
ALBUMIN SERPL-MCNC: 2 G/DL (ref 3.4–5)
ALP BLD-CCNC: 100 U/L (ref 40–129)
ALT SERPL-CCNC: 15 U/L (ref 10–40)
AMMONIA: 62 UMOL/L (ref 16–60)
ANION GAP SERPL CALCULATED.3IONS-SCNC: 6 MMOL/L (ref 3–16)
AST SERPL-CCNC: 28 U/L (ref 15–37)
BASOPHILS ABSOLUTE: 0 K/UL (ref 0–0.2)
BASOPHILS RELATIVE PERCENT: 0.8 %
BILIRUB SERPL-MCNC: 1.3 MG/DL (ref 0–1)
BUN BLDV-MCNC: 4 MG/DL (ref 7–20)
CALCIUM SERPL-MCNC: 8.3 MG/DL (ref 8.3–10.6)
CHLORIDE BLD-SCNC: 113 MMOL/L (ref 99–110)
CO2: 23 MMOL/L (ref 21–32)
CREAT SERPL-MCNC: 0.7 MG/DL (ref 0.8–1.3)
EOSINOPHILS ABSOLUTE: 0.2 K/UL (ref 0–0.6)
EOSINOPHILS RELATIVE PERCENT: 5.7 %
GFR AFRICAN AMERICAN: >60
GFR NON-AFRICAN AMERICAN: >60
GLOBULIN: 3.2 G/DL
GLUCOSE BLD-MCNC: 111 MG/DL (ref 70–99)
HCT VFR BLD CALC: 22.3 % (ref 40.5–52.5)
HEMOGLOBIN: 7.3 G/DL (ref 13.5–17.5)
LYMPHOCYTES ABSOLUTE: 0.9 K/UL (ref 1–5.1)
LYMPHOCYTES RELATIVE PERCENT: 30.6 %
MAGNESIUM: 1.9 MG/DL (ref 1.8–2.4)
MCH RBC QN AUTO: 33.9 PG (ref 26–34)
MCHC RBC AUTO-ENTMCNC: 32.9 G/DL (ref 31–36)
MCV RBC AUTO: 103.1 FL (ref 80–100)
MONOCYTES ABSOLUTE: 0.4 K/UL (ref 0–1.3)
MONOCYTES RELATIVE PERCENT: 12.8 %
NEUTROPHILS ABSOLUTE: 1.5 K/UL (ref 1.7–7.7)
NEUTROPHILS RELATIVE PERCENT: 50.1 %
PDW BLD-RTO: 18.8 % (ref 12.4–15.4)
PLATELET # BLD: 83 K/UL (ref 135–450)
PMV BLD AUTO: 8.3 FL (ref 5–10.5)
POTASSIUM REFLEX MAGNESIUM: 3.5 MMOL/L (ref 3.5–5.1)
RBC # BLD: 2.17 M/UL (ref 4.2–5.9)
SODIUM BLD-SCNC: 142 MMOL/L (ref 136–145)
TOTAL PROTEIN: 5.2 G/DL (ref 6.4–8.2)
WBC # BLD: 3 K/UL (ref 4–11)

## 2019-08-18 PROCEDURE — 2580000003 HC RX 258: Performed by: NURSE PRACTITIONER

## 2019-08-18 PROCEDURE — 80053 COMPREHEN METABOLIC PANEL: CPT

## 2019-08-18 PROCEDURE — 85025 COMPLETE CBC W/AUTO DIFF WBC: CPT

## 2019-08-18 PROCEDURE — 83735 ASSAY OF MAGNESIUM: CPT

## 2019-08-18 PROCEDURE — 6360000002 HC RX W HCPCS: Performed by: INTERNAL MEDICINE

## 2019-08-18 PROCEDURE — 6370000000 HC RX 637 (ALT 250 FOR IP): Performed by: INTERNAL MEDICINE

## 2019-08-18 PROCEDURE — 94760 N-INVAS EAR/PLS OXIMETRY 1: CPT

## 2019-08-18 PROCEDURE — 82140 ASSAY OF AMMONIA: CPT

## 2019-08-18 PROCEDURE — 6360000002 HC RX W HCPCS: Performed by: NURSE PRACTITIONER

## 2019-08-18 PROCEDURE — 36415 COLL VENOUS BLD VENIPUNCTURE: CPT

## 2019-08-18 PROCEDURE — 1200000000 HC SEMI PRIVATE

## 2019-08-18 RX ADMIN — POTASSIUM CHLORIDE 40 MEQ: 750 TABLET, EXTENDED RELEASE ORAL at 08:53

## 2019-08-18 RX ADMIN — FOLIC ACID 1 MG: 1 TABLET ORAL at 08:54

## 2019-08-18 RX ADMIN — MEROPENEM 500 MG: 500 INJECTION, POWDER, FOR SOLUTION INTRAVENOUS at 00:41

## 2019-08-18 RX ADMIN — SODIUM CHLORIDE, POTASSIUM CHLORIDE, SODIUM LACTATE AND CALCIUM CHLORIDE: 600; 310; 30; 20 INJECTION, SOLUTION INTRAVENOUS at 08:55

## 2019-08-18 RX ADMIN — MEROPENEM 500 MG: 500 INJECTION, POWDER, FOR SOLUTION INTRAVENOUS at 18:31

## 2019-08-18 RX ADMIN — RIFAXIMIN 550 MG: 550 TABLET ORAL at 08:54

## 2019-08-18 RX ADMIN — MEROPENEM 500 MG: 500 INJECTION, POWDER, FOR SOLUTION INTRAVENOUS at 12:36

## 2019-08-18 RX ADMIN — Medication 1 TABLET: at 08:54

## 2019-08-18 RX ADMIN — RIFAXIMIN 550 MG: 550 TABLET ORAL at 21:10

## 2019-08-18 RX ADMIN — Medication 100 MG: at 08:53

## 2019-08-18 RX ADMIN — DULOXETINE HYDROCHLORIDE 30 MG: 30 CAPSULE, DELAYED RELEASE ORAL at 08:54

## 2019-08-18 RX ADMIN — SODIUM CHLORIDE, PRESERVATIVE FREE 10 ML: 5 INJECTION INTRAVENOUS at 21:10

## 2019-08-18 RX ADMIN — MICONAZOLE NITRATE: 20 POWDER TOPICAL at 21:13

## 2019-08-18 RX ADMIN — MICONAZOLE NITRATE: 20 POWDER TOPICAL at 08:56

## 2019-08-18 RX ADMIN — SPIRONOLACTONE 50 MG: 25 TABLET ORAL at 08:54

## 2019-08-18 RX ADMIN — LACTULOSE 20 G: 20 SOLUTION ORAL at 08:54

## 2019-08-18 RX ADMIN — QUETIAPINE FUMARATE 12.5 MG: 25 TABLET ORAL at 21:10

## 2019-08-18 RX ADMIN — LACTULOSE 20 G: 20 SOLUTION ORAL at 21:10

## 2019-08-18 RX ADMIN — SODIUM CHLORIDE, POTASSIUM CHLORIDE, SODIUM LACTATE AND CALCIUM CHLORIDE: 600; 310; 30; 20 INJECTION, SOLUTION INTRAVENOUS at 18:31

## 2019-08-18 RX ADMIN — AMIODARONE HYDROCHLORIDE 200 MG: 200 TABLET ORAL at 08:53

## 2019-08-18 RX ADMIN — ENOXAPARIN SODIUM 40 MG: 40 INJECTION SUBCUTANEOUS at 08:54

## 2019-08-18 RX ADMIN — LACTULOSE 20 G: 20 SOLUTION ORAL at 16:15

## 2019-08-18 RX ADMIN — SODIUM CHLORIDE, PRESERVATIVE FREE 10 ML: 5 INJECTION INTRAVENOUS at 09:55

## 2019-08-18 RX ADMIN — NADOLOL 20 MG: 20 TABLET ORAL at 08:54

## 2019-08-18 RX ADMIN — MEROPENEM 500 MG: 500 INJECTION, POWDER, FOR SOLUTION INTRAVENOUS at 06:12

## 2019-08-18 ASSESSMENT — PAIN SCALES - GENERAL: PAINLEVEL_OUTOF10: 0

## 2019-08-18 NOTE — PLAN OF CARE
Problem: Falls - Risk of:  Goal: Will remain free from falls  Description  Will remain free from falls  Outcome: Ongoing  Goal: Absence of physical injury  Description  Absence of physical injury  Outcome: Ongoing     Problem: Risk for Impaired Skin Integrity  Goal: Tissue integrity - skin and mucous membranes  Description  Structural intactness and normal physiological function of skin and  mucous membranes.   Outcome: Ongoing     Problem: Self-Care:  Goal: Ability to participate in self-care as condition permits will improve  Description  Ability to participate in self-care as condition permits will improve  Outcome: Ongoing     Problem: Pain:  Goal: Pain level will decrease  Description  Pain level will decrease  Outcome: Ongoing     Problem: Nutrition  Goal: Optimal nutrition therapy  Outcome: Ongoing

## 2019-08-18 NOTE — PROGRESS NOTES
Hospitalist Progress Note      PCP: MOI MOURA    Date of Admission: 8/1/2019    Subjective: feeling better today, no fever, chills or abdominal pain        Medications:  Reviewed    Infusion Medications    lactated ringers 100 mL/hr at 08/17/19 1943     Scheduled Medications    potassium chloride  40 mEq Oral Daily    enoxaparin  40 mg Subcutaneous Daily    meropenem  500 mg Intravenous Q6H    spironolactone  50 mg Oral Daily    QUEtiapine  12.5 mg Oral Nightly    lactulose  20 g Oral TID    amiodarone  200 mg Oral Daily    DULoxetine  30 mg Oral Daily    folic acid  1 mg Oral Daily    miconazole   Topical BID    nadolol  20 mg Oral Daily    rifaximin  550 mg Oral BID    vitamin B-1  100 mg Oral Daily    magnesium cl-calcium carbonate  1 tablet Oral Daily    lidocaine 1 % injection  5 mL Intradermal Once    sodium chloride flush  10 mL Intravenous 2 times per day     PRN Meds: morphine, sodium chloride flush, magnesium hydroxide, ondansetron      Intake/Output Summary (Last 24 hours) at 8/18/2019 0806  Last data filed at 8/18/2019 0417  Gross per 24 hour   Intake 3071 ml   Output 2675 ml   Net 396 ml       Physical Exam Performed:    /66   Pulse 72   Temp 97.7 °F (36.5 °C) (Axillary)   Resp 16   Ht 5' 6\" (1.676 m)   Wt 211 lb 10.3 oz (96 kg)   SpO2 (!) 89%   BMI 34.16 kg/m²     General appearance: No apparent distress  Neck: Supple  Respiratory:  Normal respiratory effort. Clear to auscultation, bilaterally without Rales/Wheezes/Rhonchi. Cardiovascular: Regular rate and rhythm with normal S1/S2 without murmurs, rubs or gallops. Abdomen: Soft, non-tender  Musculoskeletal: improved edema   Skin: Skin color, texture, turgor normal.  No rashes or lesions.   Neurologic:  No focal weakness   Psychiatric: Alert   Capillary Refill: Brisk,< 3 seconds   Peripheral Pulses: +2 palpable, equal bilaterally       Labs:   Recent Labs     08/16/19  0608 08/17/19  0549 08/18/19  0524   WBC 7.0

## 2019-08-19 VITALS
HEIGHT: 66 IN | OXYGEN SATURATION: 90 % | BODY MASS INDEX: 34.72 KG/M2 | SYSTOLIC BLOOD PRESSURE: 128 MMHG | RESPIRATION RATE: 16 BRPM | DIASTOLIC BLOOD PRESSURE: 66 MMHG | HEART RATE: 69 BPM | WEIGHT: 216.05 LBS | TEMPERATURE: 98.8 F

## 2019-08-19 LAB
A/G RATIO: 0.7 (ref 1.1–2.2)
ALBUMIN SERPL-MCNC: 2.3 G/DL (ref 3.4–5)
ALP BLD-CCNC: 110 U/L (ref 40–129)
ALT SERPL-CCNC: 18 U/L (ref 10–40)
AMMONIA: 63 UMOL/L (ref 16–60)
ANION GAP SERPL CALCULATED.3IONS-SCNC: 7 MMOL/L (ref 3–16)
AST SERPL-CCNC: 34 U/L (ref 15–37)
BASOPHILS ABSOLUTE: 0 K/UL (ref 0–0.2)
BASOPHILS RELATIVE PERCENT: 0.8 %
BILIRUB SERPL-MCNC: 1.3 MG/DL (ref 0–1)
BUN BLDV-MCNC: 4 MG/DL (ref 7–20)
CALCIUM SERPL-MCNC: 8.3 MG/DL (ref 8.3–10.6)
CHLORIDE BLD-SCNC: 106 MMOL/L (ref 99–110)
CO2: 25 MMOL/L (ref 21–32)
CREAT SERPL-MCNC: 0.6 MG/DL (ref 0.8–1.3)
EOSINOPHILS ABSOLUTE: 0.1 K/UL (ref 0–0.6)
EOSINOPHILS RELATIVE PERCENT: 3.4 %
GFR AFRICAN AMERICAN: >60
GFR NON-AFRICAN AMERICAN: >60
GLOBULIN: 3.1 G/DL
GLUCOSE BLD-MCNC: 89 MG/DL (ref 70–99)
HCT VFR BLD CALC: 22.1 % (ref 40.5–52.5)
HEMOGLOBIN: 7.4 G/DL (ref 13.5–17.5)
LYMPHOCYTES ABSOLUTE: 1.3 K/UL (ref 1–5.1)
LYMPHOCYTES RELATIVE PERCENT: 33.4 %
MCH RBC QN AUTO: 33.8 PG (ref 26–34)
MCHC RBC AUTO-ENTMCNC: 33.5 G/DL (ref 31–36)
MCV RBC AUTO: 101.1 FL (ref 80–100)
MONOCYTES ABSOLUTE: 0.4 K/UL (ref 0–1.3)
MONOCYTES RELATIVE PERCENT: 11.3 %
NEUTROPHILS ABSOLUTE: 2 K/UL (ref 1.7–7.7)
NEUTROPHILS RELATIVE PERCENT: 51.1 %
PDW BLD-RTO: 18.9 % (ref 12.4–15.4)
PLATELET # BLD: 81 K/UL (ref 135–450)
PMV BLD AUTO: 8.6 FL (ref 5–10.5)
POTASSIUM REFLEX MAGNESIUM: 4.2 MMOL/L (ref 3.5–5.1)
RBC # BLD: 2.19 M/UL (ref 4.2–5.9)
SODIUM BLD-SCNC: 138 MMOL/L (ref 136–145)
TOTAL PROTEIN: 5.4 G/DL (ref 6.4–8.2)
WBC # BLD: 3.8 K/UL (ref 4–11)

## 2019-08-19 PROCEDURE — 99232 SBSQ HOSP IP/OBS MODERATE 35: CPT | Performed by: INTERNAL MEDICINE

## 2019-08-19 PROCEDURE — 6370000000 HC RX 637 (ALT 250 FOR IP): Performed by: INTERNAL MEDICINE

## 2019-08-19 PROCEDURE — 36415 COLL VENOUS BLD VENIPUNCTURE: CPT

## 2019-08-19 PROCEDURE — 97530 THERAPEUTIC ACTIVITIES: CPT

## 2019-08-19 PROCEDURE — 6360000002 HC RX W HCPCS: Performed by: INTERNAL MEDICINE

## 2019-08-19 PROCEDURE — 6360000002 HC RX W HCPCS: Performed by: NURSE PRACTITIONER

## 2019-08-19 PROCEDURE — 93005 ELECTROCARDIOGRAM TRACING: CPT | Performed by: FAMILY MEDICINE

## 2019-08-19 PROCEDURE — 2580000003 HC RX 258: Performed by: NURSE PRACTITIONER

## 2019-08-19 PROCEDURE — 97110 THERAPEUTIC EXERCISES: CPT

## 2019-08-19 PROCEDURE — 80053 COMPREHEN METABOLIC PANEL: CPT

## 2019-08-19 PROCEDURE — 82140 ASSAY OF AMMONIA: CPT

## 2019-08-19 PROCEDURE — 85025 COMPLETE CBC W/AUTO DIFF WBC: CPT

## 2019-08-19 PROCEDURE — 94761 N-INVAS EAR/PLS OXIMETRY MLT: CPT

## 2019-08-19 PROCEDURE — 93971 EXTREMITY STUDY: CPT

## 2019-08-19 RX ORDER — SPIRONOLACTONE 50 MG/1
50 TABLET, FILM COATED ORAL DAILY
Qty: 30 TABLET | Refills: 3 | Status: ON HOLD | OUTPATIENT
Start: 2019-08-20 | End: 2019-12-04 | Stop reason: HOSPADM

## 2019-08-19 RX ADMIN — FOLIC ACID 1 MG: 1 TABLET ORAL at 09:11

## 2019-08-19 RX ADMIN — RIFAXIMIN 550 MG: 550 TABLET ORAL at 09:11

## 2019-08-19 RX ADMIN — MEROPENEM 500 MG: 500 INJECTION, POWDER, FOR SOLUTION INTRAVENOUS at 11:26

## 2019-08-19 RX ADMIN — DULOXETINE HYDROCHLORIDE 30 MG: 30 CAPSULE, DELAYED RELEASE ORAL at 09:11

## 2019-08-19 RX ADMIN — SODIUM CHLORIDE, POTASSIUM CHLORIDE, SODIUM LACTATE AND CALCIUM CHLORIDE: 600; 310; 30; 20 INJECTION, SOLUTION INTRAVENOUS at 09:41

## 2019-08-19 RX ADMIN — AMIODARONE HYDROCHLORIDE 200 MG: 200 TABLET ORAL at 09:11

## 2019-08-19 RX ADMIN — POTASSIUM CHLORIDE 40 MEQ: 750 TABLET, EXTENDED RELEASE ORAL at 09:11

## 2019-08-19 RX ADMIN — LACTULOSE 20 G: 20 SOLUTION ORAL at 14:32

## 2019-08-19 RX ADMIN — SODIUM CHLORIDE, PRESERVATIVE FREE 10 ML: 5 INJECTION INTRAVENOUS at 09:11

## 2019-08-19 RX ADMIN — NADOLOL 20 MG: 20 TABLET ORAL at 09:11

## 2019-08-19 RX ADMIN — Medication 100 MG: at 09:11

## 2019-08-19 RX ADMIN — Medication 1 TABLET: at 09:11

## 2019-08-19 RX ADMIN — LACTULOSE 20 G: 20 SOLUTION ORAL at 09:11

## 2019-08-19 RX ADMIN — MEROPENEM 500 MG: 500 INJECTION, POWDER, FOR SOLUTION INTRAVENOUS at 01:07

## 2019-08-19 RX ADMIN — MEROPENEM 500 MG: 500 INJECTION, POWDER, FOR SOLUTION INTRAVENOUS at 05:51

## 2019-08-19 RX ADMIN — MORPHINE SULFATE 2 MG: 2 INJECTION, SOLUTION INTRAMUSCULAR; INTRAVENOUS at 05:30

## 2019-08-19 RX ADMIN — SPIRONOLACTONE 50 MG: 25 TABLET ORAL at 09:11

## 2019-08-19 RX ADMIN — ENOXAPARIN SODIUM 40 MG: 40 INJECTION SUBCUTANEOUS at 09:11

## 2019-08-19 RX ADMIN — MICONAZOLE NITRATE: 20 POWDER TOPICAL at 09:10

## 2019-08-19 ASSESSMENT — PAIN SCALES - GENERAL
PAINLEVEL_OUTOF10: 0
PAINLEVEL_OUTOF10: 7
PAINLEVEL_OUTOF10: 0

## 2019-08-19 ASSESSMENT — PAIN DESCRIPTION - PAIN TYPE: TYPE: ACUTE PAIN

## 2019-08-19 ASSESSMENT — PAIN SCALES - WONG BAKER
WONGBAKER_NUMERICALRESPONSE: 0
WONGBAKER_NUMERICALRESPONSE: 0

## 2019-08-19 ASSESSMENT — PAIN DESCRIPTION - ONSET: ONSET: GRADUAL

## 2019-08-19 ASSESSMENT — PAIN DESCRIPTION - ORIENTATION: ORIENTATION: OTHER (COMMENT)

## 2019-08-19 ASSESSMENT — PAIN DESCRIPTION - DESCRIPTORS: DESCRIPTORS: ACHING

## 2019-08-19 ASSESSMENT — PAIN - FUNCTIONAL ASSESSMENT: PAIN_FUNCTIONAL_ASSESSMENT: PREVENTS OR INTERFERES SOME ACTIVE ACTIVITIES AND ADLS

## 2019-08-19 ASSESSMENT — PAIN DESCRIPTION - PROGRESSION: CLINICAL_PROGRESSION: GRADUALLY IMPROVING

## 2019-08-19 ASSESSMENT — PAIN DESCRIPTION - LOCATION: LOCATION: GENERALIZED

## 2019-08-19 ASSESSMENT — PAIN DESCRIPTION - FREQUENCY: FREQUENCY: INTERMITTENT

## 2019-08-19 NOTE — DISCHARGE SUMMARY
disease related, no signs of active bleeding. hematology consulted.   - stable     Acute encephalopathy, metabolic, multofactorial, improved  Generalized weakness, as above.   Essential hypertension  - stable       Disposition:  Home    Exam:     /66   Pulse 69   Temp 98.8 °F (37.1 °C) (Oral)   Resp 16   Ht 5' 6\" (1.676 m)   Wt 216 lb 0.8 oz (98 kg)   SpO2 90%   BMI 34.87 kg/m²     General appearance:  No apparent distress, appears comfortable  HEENT:  Normal cephalic, atraumatic without obvious deformity. Pupils equal, round, and reactive to light. Extra ocular muscles intact. Conjunctivae/corneas clear. Neck: Supple, with full range of motion. No jugular venous distention. Trachea midline. Respiratory:  Normal respiratory effort. Clear to auscultation, bilaterally without Rales/Wheezes/Rhonchi. Cardiovascular:  Regular rate and rhythm with normal S1/S2 without murmurs, rubs or gallops. Abdomen: Soft, non-tender, non-distended with normal bowel sounds. Musculoskeletal:  No clubbing, cyanosis or edema bilaterally. Skin: Skin color, texture, turgor normal.  No rashes or lesions. Neurologic:  Neurovascularly intact without any focal sensory/motor deficits. Cranial nerves: II-XII intact, grossly non-focal.  Psychiatric:  Alert and oriented to name, affect pleasant     Consults:     IP CONSULT TO GENERAL SURGERY  IP CONSULT TO GI  IP CONSULT TO GI  IP CONSULT TO INFECTIOUS DISEASES  IP CONSULT TO ONCOLOGY  IP CONSULT TO SPIRITUAL SERVICES  IP CONSULT TO GENERAL SURGERY    Labs:  For convenience and continuity at follow-up the following most recent labs are provided:    Lab Results   Component Value Date    WBC 3.8 08/19/2019    HGB 7.4 08/19/2019    HCT 22.1 08/19/2019    .1 08/19/2019    PLT 81 08/19/2019     08/19/2019    K 4.2 08/19/2019     08/19/2019    CO2 25 08/19/2019    BUN 4 08/19/2019    CREATININE 0.6 08/19/2019    CALCIUM 8.3 08/19/2019    PHOS 4.5 07/25/2019

## 2019-08-19 NOTE — PLAN OF CARE
Problem: Falls - Risk of:  Goal: Will remain free from falls  Description Fall risk assessment completed every shift. All precautions in place. Pt has call light within reach at all times. Room clear of clutter. Pt aware to call for assistance when getting up. Bed locked in lowest position, alarm engaged, call light within reach, will continue to monitor. Will remain free from falls  Outcome: Ongoing  Goal: Absence of physical injury  Description  Absence of physical injury  Outcome: Ongoing     Problem: Risk for Impaired Skin Integrity  Goal: Tissue integrity - skin and mucous membranes  Description Skin assessment completed every shift. Pt assessed for incontinence, appropriate barrier cream applied prn. Pt encouraged to turn/rotate every 2 hours. Assistance provided if pt unable to do so themselves. Problem: Pain:  Goal: Pain level will decrease  Description  Pain level will decrease  Outcome: Ongoing  Goal: Control of acute pain  Description  Control of acute pain  Outcome: Ongoing  Goal: Control of chronic pain  Description Pain/discomfort being managed with PRN analgesics per MD orders. Pt able to express presence and absence of pain and rate pain appropriately using numerical scale. Control of chronic pain  Outcome: Ongoing     Structural intactness and normal physiological function of skin and  mucous membranes.   Outcome: Ongoing

## 2019-08-19 NOTE — PROGRESS NOTES
Hematology Oncology Daily Progress Note    Admit Date: 8/1/2019  Hospital day several    Subjective:     Patient has complaints of slowly improving confusion--denies sob/cp. Medication side effects: none    Scheduled Meds:   potassium chloride  40 mEq Oral Daily    enoxaparin  40 mg Subcutaneous Daily    meropenem  500 mg Intravenous Q6H    spironolactone  50 mg Oral Daily    QUEtiapine  12.5 mg Oral Nightly    lactulose  20 g Oral TID    amiodarone  200 mg Oral Daily    DULoxetine  30 mg Oral Daily    folic acid  1 mg Oral Daily    miconazole   Topical BID    nadolol  20 mg Oral Daily    rifaximin  550 mg Oral BID    vitamin B-1  100 mg Oral Daily    magnesium cl-calcium carbonate  1 tablet Oral Daily    lidocaine 1 % injection  5 mL Intradermal Once    sodium chloride flush  10 mL Intravenous 2 times per day     Continuous Infusions:   lactated ringers 100 mL/hr at 08/18/19 1831     PRN Meds:sodium chloride flush, magnesium hydroxide, ondansetron    Review of Systems  Pertinent items are noted in HPI. REVIEW OF SYSTEMS:         · Constitutional: Denies fever, sweats, weight loss     · Eyes: No visual changes or diplopia. No scleral icterus. · ENT: No Headaches, hearing loss or vertigo. No mouth sores or sore throat. · Cardiovascular: No chest pain, dyspnea on exertion, palpitations or loss of consciousness. · Respiratory: No cough or wheezing, no sputum production. No hemoptysis. .    · Gastrointestinal: No abdominal pain, appetite loss, blood in stools. No change in bowel habits. · Genitourinary: No dysuria, trouble voiding, or hematuria. · Musculoskeletal:  Generalized weakness. No joint complaints. · Integumentary: No rash or pruritis. · Neurological: No headache, diplopia. No change in gait, balance, or coordination. No paresthesias. · Endocrine: No temperature intolerance. No excessive thirst, fluid intake, or urination.    · Hematologic/Lymphatic: No abnormal bruising or Neurologic:   Alert to person and place. Data Review  CBC:   Lab Results   Component Value Date    WBC 3.8 08/19/2019    RBC 2.19 08/19/2019       Assessment:     Principal Problem:    Abdominal pain  Active Problems:    Acute metabolic encephalopathy    Alcoholic cirrhosis (HCC)    Dementia    Thrombocytopenia (HCC)    Hypokalemia    Urinary tract infection associated with cystostomy catheter (HCC)    Generalized weakness    JAIME (acute kidney injury) ([facility] [address]    Elevated lactic acid level    Cecal volvulus (HCC)    Altered mental status    Dilatation of colon    Ileus (HCC)    Moderate malnutrition ([facility] [address]  Resolved Problems:    * No resolved hospital problems. *      Plan:     1. Pancytopenia. His counts are relatively stable and at his baseline. This is due to hypersplenism. 2.  Hepatic encephalopathy.   This is per the primary team.        Electronically signed by Tyrell Young MD on 8/19/2019 at 7:48 AM

## 2019-08-19 NOTE — PROGRESS NOTES
Moderate malnutrition (HCC) [E44.0]    Dilatation of colon [K59.39]    Ileus ([address] [K56.7]    Cecal volvulus (HCC) [K56.2]    Altered mental status [R41.82]    Elevated lactic acid level [R79.89]    Abdominal pain [R10.9]    JAIME (acute kidney injury) ([address] [N17.9]    Hypokalemia [E87.6]    Urinary tract infection associated with cystostomy catheter ([facility] [address] [T83.510A, N39.0]    Thrombocytopenia (HCC) [D69.6]    Generalized weakness [R53.1]    Acute metabolic encephalopathy [M04.15]    Alcoholic cirrhosis ([facility] [address] [N42.22]    Dementia [F03.90]     Ileus - improved  - CT abdomen and pelvis without contrast showed a markedly abnormal dilated cecum appearing to be folded anteriorly.   - surgery consulted  - GI consulted, colonoscopy without ischemia or volvulus done during this admission - fluctuating distention and discomfort. feels better this am. tolerating po intake. possible UTI, likely colonization, asymptomatic  - no leukocytosis, but had fever 2 nights ago but afebrile for 48 hrs now  - consulted ID, impiric meropenem started    prolonged QT - improving   - replacing potassium and magnesium, patient on amiodarone. Hypokalemia, better today, continue supplements   Hypomagnesemia, improved    Alcoholic cirrhosis, monitor for now. Restart spironolactone. Pancytopenia, likely liver disease related, no signs of active bleeding. hematology consulted.      Acute encephalopathy, metabolic, multofactorial, some worsening noted this am treatment as above.   Generalized weakness, as above.   Essential hypertension      DVT Prophylaxis: lovenox  Diet: DIET GENERAL;  Dietary Nutrition Supplements: Frozen Oral Supplement  Code Status: Full Code      Haleigh Ortega DO

## 2019-08-19 NOTE — PROGRESS NOTES
Alcoholic cirrhosis ([facility] [address] Anxiety, Arthritis, Basal cell carcinoma, Cerebral artery occlusion with cerebral infarction ([facility] [address] Circulation problem, Dementia, Depressive disorder, GERD (gastroesophageal reflux disease), Hypomagnesemia, Hypopotassemia, MDRO (multiple drug resistant organisms) resistance, SOB (shortness of breath), Spinal stenosis, Suicidal behavior, Syncope, Thrombocytopenia (HCC), Traumatic brain injury ([facility] [address] VRE (vancomycin resistant enterococcus) culture positive, Wears dentures, Wears glasses, and Wheezing. has a past surgical history that includes Upper gastrointestinal endoscopy; Colonoscopy; Upper gastrointestinal endoscopy (01/04/2018); Colonoscopy (08/22/2018); and Colonoscopy (N/A, 8/2/2019). Restrictions  Restrictions/Precautions  Restrictions/Precautions: Fall Risk  Position Activity Restriction  Other position/activity restrictions: General diet; +VRE, +MDRO   Subjective   General  Chart Reviewed: Yes  Patient assessed for rehabilitation services?: Yes  Additional Pertinent Hx: Per H&P: \"75 y.o. male with PMHx of dementia, hepatic encephalopathy, alcoholic cirrhosis, and hyperammonemia  presented to [facility] ED for altered mental status and abdominal distension from a nursing home. Patient was recently admitted 2 weeks ago for alcoholic cirrhosis and elevated ammonia level and discharged from the PCU on 7/25 and sent to a nursing home. Nursing staff noticed the patient was refusing to eat for the past couple of days as well as a decline in his level of consciousness and abdominal distension. History was given per nurse due to the patient's mentation. \" Pt underwent colonoscopy 8/02  Family / Caregiver Present: No  Referring Practitioner: MIC Oliver - CNP  Diagnosis: UTI, ileus, hepatic encephalopathy  Subjective  Subjective: Pt met bedside for OT tx.  Pt agreeable to therapy, denies pain  General Comment  Comments: Pt in bed, agreeable to O.T.      Orientation Training, Safety Education & Training, Self-Care / ADL, Cognitive Reorientation, Patient/Caregiver Education & Training    AM-PAC Score        AM-PAC Inpatient Daily Activity Raw Score: 15 (08/19/19 1400)  AM-PAC Inpatient ADL T-Scale Score : 34.69 (08/19/19 1400)  ADL Inpatient CMS 0-100% Score: 56.46 (08/19/19 1400)  ADL Inpatient CMS G-Code Modifier : CK (08/19/19 1400)    Goals  Short term goals  Time Frame for Short term goals: Prior to d/c--goals ongoing 8/19/19  Short term goal 1: Pt will perform functional transfers to/from ADL surfaces with supervision  Short term goal 2: Pt will groom with supervision  Short term goal 3: Pt will toilet with min A  Short term goal 4: Pt will perform UB bathing with supervision  Short term goal 5: Pt will increase strength/endurance to achieve the above goals  Long term goals  Time Frame for Long term goals : LTG=STG  Patient Goals   Patient goals : None stated       Therapy Time   Individual Concurrent Group Co-treatment   Time In 1325         Time Out 1350         Minutes 25               This note to serve as OT d/c summary if pt is d/c-ed prior to next therapy session.     Shahla Agosto, OTR/L [id number]

## 2019-08-19 NOTE — PROGRESS NOTES
volvulus ([facility] [address] Diagnoses of Alcoholic cirrhosis of liver without ascites ([facility] [address] Altered mental status, unspecified altered mental status type, and Hypokalemia were also pertinent to this visit. has a past medical history of Abnormality of gait, Alcohol dependence ([facility] [address] Alcoholic cirrhosis ([facility] [address] Anxiety, Arthritis, Basal cell carcinoma, Cerebral artery occlusion with cerebral infarction ([facility] [address] Circulation problem, Dementia, Depressive disorder, GERD (gastroesophageal reflux disease), Hypomagnesemia, Hypopotassemia, MDRO (multiple drug resistant organisms) resistance, SOB (shortness of breath), Spinal stenosis, Suicidal behavior, Syncope, Thrombocytopenia (HCC), Traumatic brain injury ([facility] [address] VRE (vancomycin resistant enterococcus) culture positive, Wears dentures, Wears glasses, and Wheezing. has a past surgical history that includes Upper gastrointestinal endoscopy; Colonoscopy; Upper gastrointestinal endoscopy (01/04/2018); Colonoscopy (08/22/2018); and Colonoscopy (N/A, 8/2/2019). Restrictions  Restrictions/Precautions  Restrictions/Precautions: Fall Risk  Position Activity Restriction  Other position/activity restrictions: General diet; +VRE, +MDRO      Subjective   General  Chart Reviewed: Yes  Additional Pertinent Hx: Pt presents from ECF with altered mental status and nausea/refusing to eat, ileus and poor surgical candidate  Response To Previous Treatment: Not applicable  Family / Caregiver Present: No  Referring Practitioner: Gabriella Whalen Comment  Comments: Pt agreeable to ambulating with PT. Family present - discussing pt's future living situation with . Pain Screening  Patient Currently in Pain: No  Vital Signs  Patient Currently in Pain: No       Objective      Bed mobility  Supine to Sit: Stand by assistance     Transfers  Sit to Stand: Minimal Assistance;Contact guard assistance(Min A initially to correct L, lateral LOB, CGA subsequent trials.   Cues throughout for proper hand

## 2019-08-19 NOTE — CARE COORDINATION
Discharge Planning:  SW contacted pts spouse to inform her that this pt is now ready for d/c. Pt stated that she no longer wants this pt to return to the Home at [facility]. SW explained that because pt does not have an alternate plan, Home at [facility] is a safe d/c at this time. Pts spouse stated that she would like for this SW to explore [address] [place] [address]. SW contacted Papitoal Guillaume at [address] [place] [address] who stated that she is able to accept this pt this evening and will complete the PASRR for this pt. SW arranged transportation with Kei Kasper for [employer]. Medical Necessity completed and faxed to [facility] at their request.    Tanisha Henderson contacted pts spouse, [facility] and pts RN to inform all of the d/c plan and time. Orders faxed to [facility]. Plan: D/c to [address] [place] [address] via [facility]i Kasper at [employer].   Electronically signed by Susannah Gomez on 8/19/2019 at 5:22 PM

## 2019-08-19 NOTE — PROGRESS NOTES
There is no immunization history on file for this patient. Social History:     Social History     Tobacco Use    Smoking status: Never Smoker    Smokeless tobacco: Never Used   Substance Use Topics    Alcohol use: Yes     Alcohol/week: 1.0 standard drinks     Types: 1 Cans of beer per week     Comment: history of drinking a bottle of whiskey a day 1can a day    Drug use: No     Social History     Tobacco Use   Smoking Status Never Smoker   Smokeless Tobacco Never Used      Family History   Problem Relation Age of Onset    Emphysema Father           REVIEW OF SYSTEMS:    No fever / chills / sweats. No weight loss. No visual change, eye pain, eye discharge. No oral lesion, sore throat, dysphagia. Denies cough / sputum/Sob   Denies chest pain, palpitations/ dizziness  Denies nausea/ vomiting/abdominal pain/diarrhea. Denies dysuria or change in urinary function. Denies joint swelling or pain. No myalgia, arthralgia. No rashes, skin lesions   Denies focal weakness, sensory change or other neurologic symptoms  No lymph node swelling or tenderness.     Change in mentation+ low platelets encephalopathy+     PHYSICAL EXAM:      Vitals:    /66   Pulse 74   Temp 98.6 °F (37 °C) (Axillary)   Resp 18   Ht 5' 6\" (1.676 m)   Wt 216 lb 0.8 oz (98 kg)   SpO2 92%   BMI 34.87 kg/m²   General Appearance: more alert+    acute distress, ++ pallor, ++ icterus   Skin: warm and dry, no rash or erythema  Head: normocephalic and atraumatic  Eyes: pupils equal, round, and reactive to light, conjunctivae normal  ENT: tympanic membrane, external ear and ear canal normal bilaterally, nose without deformity, nasal mucosa and turbinates normal without polyps  Neck: supple and non-tender without mass, no thyromegaly  no cervical lymphadenopathy  Pulmonary/Chest: clear to auscultation bilaterally- no wheezes, rales or rhonchi, normal air movement, no respiratory distress  Cardiovascular: normal rate, regular rhythm, Supra pubic catheters not helpful at all     Suspect change in mentation multifactorial with on going encephalopathy and he has lactic acidosis and this could be from Linezolid use with associated Encephalopathy. Ammonia on this admit normal and CT abd/pelvis some concern for intra abdominal process.      Some clinical improvement and lactic acid now better suspect this to be from LINEZOLID     Urine cx need no treatment as this is colonization of the Supra pubic catheter    We are asked to reevaluate give the SBO and bowel dilatation - seen by GEN SURGery-     Fevers resolved and mentation better Blood cx NGTD       Labs, Microbiology, Radiology and all the pertinent results from current hospitalization and  care every where were reviewed  by me as a part of the evaluation   Plan:   1. Linezolid list as allergy  due to side effects and VRE in Urine does not need treatment  2. Correct Lactic acidosis  3. Watch ammonia  4. Watch platelets  5. .Watch abdomen for any worsening of Cecal distension  7.s/p Colonoscopy normal no volvulus  8. URINE CULTURE -  no Treatment as this is Colonization of Supra pubic catheter and Urine cultures not helpful   9. bLOOD CX NGTD - will d/c Meropenem and ok for d/c planning         Discussed with patient/Family d/w RN    Thanks for allowing me to participate in your patient's care and please call me with any questions or concerns.     Lena Dietz MD  Infectious Disease  [facility]) Physician  Phone: [phone]   Fax : [phone]

## 2019-08-19 NOTE — PROGRESS NOTES
Discharge orders acknowledged by RN . Discharge teaching completed with pt. AVS and FRENCH reviewed and all questions answered. IV removed. Telemonitor removed and returned to [facility]. All education completed. Pt vitals WDL. Pt discharged with all belongings to [facility]  with Kei Kasper. Moni Vargas RN called on update of pt discharge. Medications reviewed with Flori. All questions answered. Pt transported off of unit via stretcher. No complications.    Electronically signed by Wendy Dick RN on 8/19/2019 at 6:22 PM

## 2019-08-20 LAB
EKG ATRIAL RATE: 69 BPM
EKG DIAGNOSIS: NORMAL
EKG P AXIS: 3 DEGREES
EKG P-R INTERVAL: 176 MS
EKG Q-T INTERVAL: 506 MS
EKG QRS DURATION: 100 MS
EKG QTC CALCULATION (BAZETT): 542 MS
EKG R AXIS: 36 DEGREES
EKG T AXIS: 38 DEGREES
EKG VENTRICULAR RATE: 69 BPM

## 2019-08-20 PROCEDURE — 93010 ELECTROCARDIOGRAM REPORT: CPT | Performed by: INTERNAL MEDICINE

## 2019-08-22 LAB
BLOOD CULTURE, ROUTINE: NORMAL
CULTURE, BLOOD 2: NORMAL

## 2019-09-25 ENCOUNTER — APPOINTMENT (OUTPATIENT)
Dept: GENERAL RADIOLOGY | Age: 69
DRG: 312 | End: 2019-09-25
Payer: MEDICARE

## 2019-09-25 ENCOUNTER — HOSPITAL ENCOUNTER (INPATIENT)
Age: 69
LOS: 4 days | Discharge: NURSING, REHAB OR LONG-TERM CARE | DRG: 312 | End: 2019-09-29
Attending: EMERGENCY MEDICINE | Admitting: HOSPITALIST
Payer: MEDICARE

## 2019-09-25 DIAGNOSIS — N28.9 ACUTE RENAL INSUFFICIENCY: ICD-10-CM

## 2019-09-25 DIAGNOSIS — K56.2 CECAL VOLVULUS (HCC): ICD-10-CM

## 2019-09-25 DIAGNOSIS — R55 NEAR SYNCOPE: Primary | ICD-10-CM

## 2019-09-25 LAB
A/G RATIO: 0.7 (ref 1.1–2.2)
ALBUMIN SERPL-MCNC: 2.8 G/DL (ref 3.4–5)
ALP BLD-CCNC: 92 U/L (ref 40–129)
ALT SERPL-CCNC: 15 U/L (ref 10–40)
AMMONIA: 70 UMOL/L (ref 16–60)
ANION GAP SERPL CALCULATED.3IONS-SCNC: 13 MMOL/L (ref 3–16)
AST SERPL-CCNC: 33 U/L (ref 15–37)
BASOPHILS ABSOLUTE: 0 K/UL (ref 0–0.2)
BASOPHILS RELATIVE PERCENT: 0.9 %
BILIRUB SERPL-MCNC: 1.3 MG/DL (ref 0–1)
BILIRUBIN URINE: NEGATIVE
BLOOD, URINE: NEGATIVE
BUN BLDV-MCNC: 22 MG/DL (ref 7–20)
CALCIUM SERPL-MCNC: 9.5 MG/DL (ref 8.3–10.6)
CHLORIDE BLD-SCNC: 102 MMOL/L (ref 99–110)
CLARITY: CLEAR
CO2: 25 MMOL/L (ref 21–32)
COLOR: YELLOW
CREAT SERPL-MCNC: 1.5 MG/DL (ref 0.8–1.3)
EKG ATRIAL RATE: 63 BPM
EKG DIAGNOSIS: NORMAL
EKG P AXIS: -22 DEGREES
EKG P-R INTERVAL: 152 MS
EKG Q-T INTERVAL: 598 MS
EKG QRS DURATION: 112 MS
EKG QTC CALCULATION (BAZETT): 611 MS
EKG R AXIS: 50 DEGREES
EKG T AXIS: 35 DEGREES
EKG VENTRICULAR RATE: 63 BPM
EOSINOPHILS ABSOLUTE: 0.1 K/UL (ref 0–0.6)
EOSINOPHILS RELATIVE PERCENT: 2.3 %
GFR AFRICAN AMERICAN: 56
GFR NON-AFRICAN AMERICAN: 46
GLOBULIN: 4.3 G/DL
GLUCOSE BLD-MCNC: 110 MG/DL (ref 70–99)
GLUCOSE URINE: NEGATIVE MG/DL
HCT VFR BLD CALC: 31 % (ref 40.5–52.5)
HEMOGLOBIN: 10.1 G/DL (ref 13.5–17.5)
KETONES, URINE: NEGATIVE MG/DL
LACTIC ACID: 1.6 MMOL/L (ref 0.4–2)
LEUKOCYTE ESTERASE, URINE: NEGATIVE
LV EF: 58 %
LVEF MODALITY: NORMAL
LYMPHOCYTES ABSOLUTE: 1.4 K/UL (ref 1–5.1)
LYMPHOCYTES RELATIVE PERCENT: 33.8 %
MAGNESIUM: 1.6 MG/DL (ref 1.8–2.4)
MCH RBC QN AUTO: 32.6 PG (ref 26–34)
MCHC RBC AUTO-ENTMCNC: 32.6 G/DL (ref 31–36)
MCV RBC AUTO: 99.9 FL (ref 80–100)
MICROSCOPIC EXAMINATION: NORMAL
MONOCYTES ABSOLUTE: 0.5 K/UL (ref 0–1.3)
MONOCYTES RELATIVE PERCENT: 11.7 %
NEUTROPHILS ABSOLUTE: 2.1 K/UL (ref 1.7–7.7)
NEUTROPHILS RELATIVE PERCENT: 51.3 %
NITRITE, URINE: NEGATIVE
PDW BLD-RTO: 19.4 % (ref 12.4–15.4)
PH UA: 7 (ref 5–8)
PLATELET # BLD: 86 K/UL (ref 135–450)
PMV BLD AUTO: 9.2 FL (ref 5–10.5)
POTASSIUM REFLEX MAGNESIUM: 3 MMOL/L (ref 3.5–5.1)
PROTEIN UA: NEGATIVE MG/DL
RBC # BLD: 3.11 M/UL (ref 4.2–5.9)
SODIUM BLD-SCNC: 140 MMOL/L (ref 136–145)
SPECIFIC GRAVITY UA: 1.01 (ref 1–1.03)
TOTAL PROTEIN: 7.1 G/DL (ref 6.4–8.2)
TROPONIN: <0.01 NG/ML
TROPONIN: <0.01 NG/ML
URINE REFLEX TO CULTURE: NORMAL
URINE TYPE: NORMAL
UROBILINOGEN, URINE: 0.2 E.U./DL
WBC # BLD: 4.2 K/UL (ref 4–11)

## 2019-09-25 PROCEDURE — 6360000002 HC RX W HCPCS: Performed by: HOSPITALIST

## 2019-09-25 PROCEDURE — 85025 COMPLETE CBC W/AUTO DIFF WBC: CPT

## 2019-09-25 PROCEDURE — 36415 COLL VENOUS BLD VENIPUNCTURE: CPT

## 2019-09-25 PROCEDURE — 84484 ASSAY OF TROPONIN QUANT: CPT

## 2019-09-25 PROCEDURE — 82140 ASSAY OF AMMONIA: CPT

## 2019-09-25 PROCEDURE — 93306 TTE W/DOPPLER COMPLETE: CPT

## 2019-09-25 PROCEDURE — 2580000003 HC RX 258: Performed by: EMERGENCY MEDICINE

## 2019-09-25 PROCEDURE — 83605 ASSAY OF LACTIC ACID: CPT

## 2019-09-25 PROCEDURE — 93010 ELECTROCARDIOGRAM REPORT: CPT | Performed by: INTERNAL MEDICINE

## 2019-09-25 PROCEDURE — 83735 ASSAY OF MAGNESIUM: CPT

## 2019-09-25 PROCEDURE — 96360 HYDRATION IV INFUSION INIT: CPT

## 2019-09-25 PROCEDURE — 2580000003 HC RX 258: Performed by: HOSPITALIST

## 2019-09-25 PROCEDURE — 1200000000 HC SEMI PRIVATE

## 2019-09-25 PROCEDURE — 6370000000 HC RX 637 (ALT 250 FOR IP): Performed by: HOSPITALIST

## 2019-09-25 PROCEDURE — 81003 URINALYSIS AUTO W/O SCOPE: CPT

## 2019-09-25 PROCEDURE — 94760 N-INVAS EAR/PLS OXIMETRY 1: CPT

## 2019-09-25 PROCEDURE — 93005 ELECTROCARDIOGRAM TRACING: CPT | Performed by: EMERGENCY MEDICINE

## 2019-09-25 PROCEDURE — 71045 X-RAY EXAM CHEST 1 VIEW: CPT

## 2019-09-25 PROCEDURE — 80053 COMPREHEN METABOLIC PANEL: CPT

## 2019-09-25 PROCEDURE — 99285 EMERGENCY DEPT VISIT HI MDM: CPT

## 2019-09-25 RX ORDER — SODIUM CHLORIDE 9 MG/ML
INJECTION, SOLUTION INTRAVENOUS CONTINUOUS
Status: DISCONTINUED | OUTPATIENT
Start: 2019-09-25 | End: 2019-09-29 | Stop reason: HOSPADM

## 2019-09-25 RX ORDER — LACTULOSE 10 G/15ML
30 SOLUTION ORAL 4 TIMES DAILY
COMMUNITY

## 2019-09-25 RX ORDER — MIDODRINE HYDROCHLORIDE 10 MG/1
10 TABLET ORAL 2 TIMES DAILY
Status: ON HOLD | COMMUNITY
End: 2019-12-24

## 2019-09-25 RX ORDER — LACTULOSE 10 G/15ML
20 SOLUTION ORAL 3 TIMES DAILY
Status: DISCONTINUED | OUTPATIENT
Start: 2019-09-25 | End: 2019-09-29 | Stop reason: HOSPADM

## 2019-09-25 RX ORDER — TORSEMIDE 20 MG/1
20 TABLET ORAL DAILY
Status: ON HOLD | COMMUNITY
End: 2019-09-29 | Stop reason: HOSPADM

## 2019-09-25 RX ORDER — THIAMINE MONONITRATE (VIT B1) 100 MG
100 TABLET ORAL DAILY
Status: DISCONTINUED | OUTPATIENT
Start: 2019-09-25 | End: 2019-09-29 | Stop reason: HOSPADM

## 2019-09-25 RX ORDER — 0.9 % SODIUM CHLORIDE 0.9 %
500 INTRAVENOUS SOLUTION INTRAVENOUS ONCE
Status: COMPLETED | OUTPATIENT
Start: 2019-09-25 | End: 2019-09-25

## 2019-09-25 RX ORDER — AMIODARONE HYDROCHLORIDE 200 MG/1
200 TABLET ORAL DAILY
Status: DISCONTINUED | OUTPATIENT
Start: 2019-09-25 | End: 2019-09-29 | Stop reason: HOSPADM

## 2019-09-25 RX ORDER — TAMSULOSIN HYDROCHLORIDE 0.4 MG/1
0.4 CAPSULE ORAL NIGHTLY
Status: DISCONTINUED | OUTPATIENT
Start: 2019-09-25 | End: 2019-09-29 | Stop reason: HOSPADM

## 2019-09-25 RX ORDER — ACETAMINOPHEN 325 MG/1
650 TABLET ORAL EVERY 4 HOURS PRN
Status: DISCONTINUED | OUTPATIENT
Start: 2019-09-25 | End: 2019-09-29 | Stop reason: HOSPADM

## 2019-09-25 RX ORDER — ONDANSETRON 2 MG/ML
4 INJECTION INTRAMUSCULAR; INTRAVENOUS EVERY 6 HOURS PRN
Status: DISCONTINUED | OUTPATIENT
Start: 2019-09-25 | End: 2019-09-29 | Stop reason: HOSPADM

## 2019-09-25 RX ORDER — DULOXETIN HYDROCHLORIDE 30 MG/1
30 CAPSULE, DELAYED RELEASE ORAL DAILY
Status: DISCONTINUED | OUTPATIENT
Start: 2019-09-25 | End: 2019-09-29 | Stop reason: HOSPADM

## 2019-09-25 RX ORDER — MIDODRINE HYDROCHLORIDE 10 MG/1
10 TABLET ORAL 2 TIMES DAILY
Status: DISCONTINUED | OUTPATIENT
Start: 2019-09-25 | End: 2019-09-29 | Stop reason: HOSPADM

## 2019-09-25 RX ORDER — POTASSIUM CHLORIDE 20 MEQ/1
20 TABLET, EXTENDED RELEASE ORAL DAILY
Status: DISCONTINUED | OUTPATIENT
Start: 2019-09-25 | End: 2019-09-27

## 2019-09-25 RX ORDER — NADOLOL 20 MG/1
20 TABLET ORAL DAILY
Status: DISCONTINUED | OUTPATIENT
Start: 2019-09-25 | End: 2019-09-25

## 2019-09-25 RX ORDER — LEVOFLOXACIN 500 MG/1
500 TABLET, FILM COATED ORAL DAILY
Status: ON HOLD | COMMUNITY
End: 2019-09-29 | Stop reason: HOSPADM

## 2019-09-25 RX ORDER — POTASSIUM CHLORIDE 20 MEQ/1
40 TABLET, EXTENDED RELEASE ORAL 2 TIMES DAILY
Status: ON HOLD | COMMUNITY
End: 2019-09-29 | Stop reason: SDUPTHER

## 2019-09-25 RX ORDER — SODIUM CHLORIDE 0.9 % (FLUSH) 0.9 %
10 SYRINGE (ML) INJECTION EVERY 12 HOURS SCHEDULED
Status: DISCONTINUED | OUTPATIENT
Start: 2019-09-25 | End: 2019-09-29 | Stop reason: HOSPADM

## 2019-09-25 RX ORDER — SODIUM CHLORIDE 0.9 % (FLUSH) 0.9 %
10 SYRINGE (ML) INJECTION PRN
Status: DISCONTINUED | OUTPATIENT
Start: 2019-09-25 | End: 2019-09-29 | Stop reason: HOSPADM

## 2019-09-25 RX ORDER — FAMOTIDINE 20 MG/1
20 TABLET, FILM COATED ORAL 2 TIMES DAILY
Status: DISCONTINUED | OUTPATIENT
Start: 2019-09-25 | End: 2019-09-27

## 2019-09-25 RX ADMIN — LACTULOSE 20 G: 20 SOLUTION ORAL at 20:11

## 2019-09-25 RX ADMIN — FAMOTIDINE 20 MG: 20 TABLET ORAL at 15:22

## 2019-09-25 RX ADMIN — RIFAXIMIN 550 MG: 550 TABLET ORAL at 15:21

## 2019-09-25 RX ADMIN — TAMSULOSIN HYDROCHLORIDE 0.4 MG: 0.4 CAPSULE ORAL at 20:11

## 2019-09-25 RX ADMIN — Medication 100 MG: at 15:21

## 2019-09-25 RX ADMIN — POTASSIUM CHLORIDE 20 MEQ: 20 TABLET, EXTENDED RELEASE ORAL at 20:13

## 2019-09-25 RX ADMIN — SODIUM CHLORIDE: 9 INJECTION, SOLUTION INTRAVENOUS at 15:21

## 2019-09-25 RX ADMIN — MIDODRINE HYDROCHLORIDE 10 MG: 10 TABLET ORAL at 15:22

## 2019-09-25 RX ADMIN — LACTULOSE 20 G: 20 SOLUTION ORAL at 16:30

## 2019-09-25 RX ADMIN — AMIODARONE HYDROCHLORIDE 200 MG: 200 TABLET ORAL at 15:22

## 2019-09-25 RX ADMIN — ONDANSETRON 4 MG: 2 INJECTION INTRAMUSCULAR; INTRAVENOUS at 22:49

## 2019-09-25 RX ADMIN — DULOXETINE HYDROCHLORIDE 30 MG: 30 CAPSULE, DELAYED RELEASE ORAL at 15:22

## 2019-09-25 RX ADMIN — FAMOTIDINE 20 MG: 20 TABLET ORAL at 20:11

## 2019-09-25 RX ADMIN — RIFAXIMIN 550 MG: 550 TABLET ORAL at 20:11

## 2019-09-25 RX ADMIN — SODIUM CHLORIDE 500 ML: 9 INJECTION, SOLUTION INTRAVENOUS at 09:34

## 2019-09-25 ASSESSMENT — PAIN DESCRIPTION - PAIN TYPE: TYPE: ACUTE PAIN

## 2019-09-25 ASSESSMENT — PAIN SCALES - GENERAL
PAINLEVEL_OUTOF10: 7
PAINLEVEL_OUTOF10: 0

## 2019-09-25 ASSESSMENT — ENCOUNTER SYMPTOMS
RHINORRHEA: 0
DIARRHEA: 0
COUGH: 0
WHEEZING: 0
SHORTNESS OF BREATH: 0
BACK PAIN: 0
SORE THROAT: 0
EYE PAIN: 0
EYE REDNESS: 0
VOMITING: 0
EYE DISCHARGE: 0
NAUSEA: 0
ABDOMINAL PAIN: 0

## 2019-09-25 ASSESSMENT — PAIN DESCRIPTION - ORIENTATION: ORIENTATION: LOWER

## 2019-09-25 ASSESSMENT — PAIN DESCRIPTION - LOCATION: LOCATION: ABDOMEN

## 2019-09-26 LAB
ANION GAP SERPL CALCULATED.3IONS-SCNC: 11 MMOL/L (ref 3–16)
BUN BLDV-MCNC: 27 MG/DL (ref 7–20)
CALCIUM SERPL-MCNC: 8.6 MG/DL (ref 8.3–10.6)
CHLORIDE BLD-SCNC: 105 MMOL/L (ref 99–110)
CO2: 24 MMOL/L (ref 21–32)
CREAT SERPL-MCNC: 1.8 MG/DL (ref 0.8–1.3)
GFR AFRICAN AMERICAN: 46
GFR NON-AFRICAN AMERICAN: 38
GLUCOSE BLD-MCNC: 99 MG/DL (ref 70–99)
HCT VFR BLD CALC: 26.6 % (ref 40.5–52.5)
HCT VFR BLD CALC: 26.6 % (ref 40.5–52.5)
HEMOGLOBIN: 8.8 G/DL (ref 13.5–17.5)
HEMOGLOBIN: 8.9 G/DL (ref 13.5–17.5)
MCH RBC QN AUTO: 33 PG (ref 26–34)
MCH RBC QN AUTO: 33.5 PG (ref 26–34)
MCHC RBC AUTO-ENTMCNC: 33.1 G/DL (ref 31–36)
MCHC RBC AUTO-ENTMCNC: 33.6 G/DL (ref 31–36)
MCV RBC AUTO: 99.6 FL (ref 80–100)
MCV RBC AUTO: 99.8 FL (ref 80–100)
PDW BLD-RTO: 19.2 % (ref 12.4–15.4)
PDW BLD-RTO: 19.3 % (ref 12.4–15.4)
PLATELET # BLD: 78 K/UL (ref 135–450)
PLATELET # BLD: 79 K/UL (ref 135–450)
PMV BLD AUTO: 9.3 FL (ref 5–10.5)
PMV BLD AUTO: 9.4 FL (ref 5–10.5)
POTASSIUM SERPL-SCNC: 3 MMOL/L (ref 3.5–5.1)
RBC # BLD: 2.66 M/UL (ref 4.2–5.9)
RBC # BLD: 2.67 M/UL (ref 4.2–5.9)
SODIUM BLD-SCNC: 140 MMOL/L (ref 136–145)
TROPONIN: <0.01 NG/ML
WBC # BLD: 4.2 K/UL (ref 4–11)
WBC # BLD: 4.5 K/UL (ref 4–11)

## 2019-09-26 PROCEDURE — 94760 N-INVAS EAR/PLS OXIMETRY 1: CPT

## 2019-09-26 PROCEDURE — 6370000000 HC RX 637 (ALT 250 FOR IP): Performed by: HOSPITALIST

## 2019-09-26 PROCEDURE — 36415 COLL VENOUS BLD VENIPUNCTURE: CPT

## 2019-09-26 PROCEDURE — 80048 BASIC METABOLIC PNL TOTAL CA: CPT

## 2019-09-26 PROCEDURE — 85027 COMPLETE CBC AUTOMATED: CPT

## 2019-09-26 PROCEDURE — 2580000003 HC RX 258: Performed by: HOSPITALIST

## 2019-09-26 PROCEDURE — 6360000002 HC RX W HCPCS: Performed by: HOSPITALIST

## 2019-09-26 PROCEDURE — 1200000000 HC SEMI PRIVATE

## 2019-09-26 PROCEDURE — 84484 ASSAY OF TROPONIN QUANT: CPT

## 2019-09-26 RX ADMIN — ENOXAPARIN SODIUM 40 MG: 40 INJECTION SUBCUTANEOUS at 09:21

## 2019-09-26 RX ADMIN — POTASSIUM CHLORIDE 20 MEQ: 20 TABLET, EXTENDED RELEASE ORAL at 09:22

## 2019-09-26 RX ADMIN — FAMOTIDINE 20 MG: 20 TABLET ORAL at 09:22

## 2019-09-26 RX ADMIN — DULOXETINE HYDROCHLORIDE 30 MG: 30 CAPSULE, DELAYED RELEASE ORAL at 09:22

## 2019-09-26 RX ADMIN — AMIODARONE HYDROCHLORIDE 200 MG: 200 TABLET ORAL at 09:22

## 2019-09-26 RX ADMIN — MIDODRINE HYDROCHLORIDE 10 MG: 10 TABLET ORAL at 18:56

## 2019-09-26 RX ADMIN — SODIUM CHLORIDE: 9 INJECTION, SOLUTION INTRAVENOUS at 05:53

## 2019-09-26 RX ADMIN — MIDODRINE HYDROCHLORIDE 10 MG: 10 TABLET ORAL at 09:21

## 2019-09-26 RX ADMIN — Medication 1 TABLET: at 09:22

## 2019-09-26 RX ADMIN — SODIUM CHLORIDE, PRESERVATIVE FREE 10 ML: 5 INJECTION INTRAVENOUS at 09:22

## 2019-09-26 RX ADMIN — LACTULOSE 20 G: 20 SOLUTION ORAL at 09:22

## 2019-09-26 RX ADMIN — SODIUM CHLORIDE: 9 INJECTION, SOLUTION INTRAVENOUS at 19:14

## 2019-09-26 RX ADMIN — Medication 100 MG: at 09:22

## 2019-09-26 RX ADMIN — LACTULOSE 20 G: 20 SOLUTION ORAL at 15:30

## 2019-09-26 RX ADMIN — RIFAXIMIN 550 MG: 550 TABLET ORAL at 09:22

## 2019-09-26 ASSESSMENT — PAIN SCALES - GENERAL
PAINLEVEL_OUTOF10: 0

## 2019-09-27 LAB
ANION GAP SERPL CALCULATED.3IONS-SCNC: 12 MMOL/L (ref 3–16)
BILIRUBIN URINE: NEGATIVE
BLOOD, URINE: ABNORMAL
BUN BLDV-MCNC: 23 MG/DL (ref 7–20)
CALCIUM SERPL-MCNC: 8.4 MG/DL (ref 8.3–10.6)
CHLORIDE BLD-SCNC: 107 MMOL/L (ref 99–110)
CLARITY: ABNORMAL
CO2: 21 MMOL/L (ref 21–32)
COLOR: YELLOW
CORTISOL - AM: 9.9 UG/DL (ref 4.3–22.4)
CREAT SERPL-MCNC: 1.3 MG/DL (ref 0.8–1.3)
CREATININE URINE: 82.2 MG/DL (ref 39–259)
EPITHELIAL CELLS, UA: 2 /HPF (ref 0–5)
GFR AFRICAN AMERICAN: >60
GFR NON-AFRICAN AMERICAN: 55
GLUCOSE BLD-MCNC: 106 MG/DL (ref 70–99)
GLUCOSE URINE: NEGATIVE MG/DL
HYALINE CASTS: 3 /LPF (ref 0–8)
KETONES, URINE: NEGATIVE MG/DL
LEUKOCYTE ESTERASE, URINE: ABNORMAL
MAGNESIUM: 1.9 MG/DL (ref 1.8–2.4)
MICROALBUMIN UR-MCNC: 21.6 MG/DL
MICROALBUMIN/CREAT UR-RTO: 262.8 MG/G (ref 0–30)
MICROSCOPIC EXAMINATION: YES
NITRITE, URINE: NEGATIVE
PH UA: 6 (ref 5–8)
POTASSIUM REFLEX MAGNESIUM: 2.6 MMOL/L (ref 3.5–5.1)
PROTEIN UA: 30 MG/DL
RBC UA: 71 /HPF (ref 0–4)
SODIUM BLD-SCNC: 140 MMOL/L (ref 136–145)
SPECIFIC GRAVITY UA: 1.02 (ref 1–1.03)
TOTAL CK: 27 U/L (ref 39–308)
TSH SERPL DL<=0.05 MIU/L-ACNC: 1.47 UIU/ML (ref 0.27–4.2)
URINE TYPE: ABNORMAL
UROBILINOGEN, URINE: 1 E.U./DL
WBC UA: 8 /HPF (ref 0–5)

## 2019-09-27 PROCEDURE — 82043 UR ALBUMIN QUANTITATIVE: CPT

## 2019-09-27 PROCEDURE — 80048 BASIC METABOLIC PNL TOTAL CA: CPT

## 2019-09-27 PROCEDURE — 6370000000 HC RX 637 (ALT 250 FOR IP): Performed by: INTERNAL MEDICINE

## 2019-09-27 PROCEDURE — 81001 URINALYSIS AUTO W/SCOPE: CPT

## 2019-09-27 PROCEDURE — 94760 N-INVAS EAR/PLS OXIMETRY 1: CPT

## 2019-09-27 PROCEDURE — 84443 ASSAY THYROID STIM HORMONE: CPT

## 2019-09-27 PROCEDURE — 6360000002 HC RX W HCPCS: Performed by: INTERNAL MEDICINE

## 2019-09-27 PROCEDURE — 36415 COLL VENOUS BLD VENIPUNCTURE: CPT

## 2019-09-27 PROCEDURE — 97161 PT EVAL LOW COMPLEX 20 MIN: CPT

## 2019-09-27 PROCEDURE — 97530 THERAPEUTIC ACTIVITIES: CPT

## 2019-09-27 PROCEDURE — 82550 ASSAY OF CK (CPK): CPT

## 2019-09-27 PROCEDURE — 2580000003 HC RX 258: Performed by: HOSPITALIST

## 2019-09-27 PROCEDURE — P9045 ALBUMIN (HUMAN), 5%, 250 ML: HCPCS | Performed by: INTERNAL MEDICINE

## 2019-09-27 PROCEDURE — 6370000000 HC RX 637 (ALT 250 FOR IP): Performed by: HOSPITALIST

## 2019-09-27 PROCEDURE — 1200000000 HC SEMI PRIVATE

## 2019-09-27 PROCEDURE — 82533 TOTAL CORTISOL: CPT

## 2019-09-27 PROCEDURE — 6360000002 HC RX W HCPCS: Performed by: HOSPITALIST

## 2019-09-27 PROCEDURE — 83735 ASSAY OF MAGNESIUM: CPT

## 2019-09-27 PROCEDURE — 82570 ASSAY OF URINE CREATININE: CPT

## 2019-09-27 RX ORDER — FAMOTIDINE 20 MG/1
20 TABLET, FILM COATED ORAL DAILY
Status: DISCONTINUED | OUTPATIENT
Start: 2019-09-28 | End: 2019-09-29 | Stop reason: HOSPADM

## 2019-09-27 RX ORDER — POTASSIUM CHLORIDE 20 MEQ/1
40 TABLET, EXTENDED RELEASE ORAL DAILY
Status: DISCONTINUED | OUTPATIENT
Start: 2019-09-28 | End: 2019-09-29 | Stop reason: HOSPADM

## 2019-09-27 RX ORDER — POTASSIUM CHLORIDE 750 MG/1
40 TABLET, FILM COATED, EXTENDED RELEASE ORAL ONCE
Status: COMPLETED | OUTPATIENT
Start: 2019-09-27 | End: 2019-09-27

## 2019-09-27 RX ORDER — ALBUMIN, HUMAN INJ 5% 5 %
12.5 SOLUTION INTRAVENOUS EVERY 8 HOURS
Status: DISPENSED | OUTPATIENT
Start: 2019-09-27 | End: 2019-09-28

## 2019-09-27 RX ORDER — POTASSIUM CHLORIDE 7.45 MG/ML
10 INJECTION INTRAVENOUS PRN
Status: DISCONTINUED | OUTPATIENT
Start: 2019-09-27 | End: 2019-09-29 | Stop reason: HOSPADM

## 2019-09-27 RX ORDER — POTASSIUM CHLORIDE 20 MEQ/1
40 TABLET, EXTENDED RELEASE ORAL PRN
Status: DISCONTINUED | OUTPATIENT
Start: 2019-09-27 | End: 2019-09-29 | Stop reason: HOSPADM

## 2019-09-27 RX ADMIN — RIFAXIMIN 550 MG: 550 TABLET ORAL at 00:36

## 2019-09-27 RX ADMIN — SODIUM CHLORIDE, PRESERVATIVE FREE 10 ML: 5 INJECTION INTRAVENOUS at 10:01

## 2019-09-27 RX ADMIN — RIFAXIMIN 550 MG: 550 TABLET ORAL at 10:00

## 2019-09-27 RX ADMIN — POTASSIUM CHLORIDE 10 MEQ: 7.46 INJECTION, SOLUTION INTRAVENOUS at 14:43

## 2019-09-27 RX ADMIN — ALBUMIN (HUMAN) 12.5 G: 12.5 INJECTION, SOLUTION INTRAVENOUS at 20:00

## 2019-09-27 RX ADMIN — MIDODRINE HYDROCHLORIDE 10 MG: 10 TABLET ORAL at 10:00

## 2019-09-27 RX ADMIN — LACTULOSE 20 G: 20 SOLUTION ORAL at 14:05

## 2019-09-27 RX ADMIN — LACTULOSE 20 G: 20 SOLUTION ORAL at 10:00

## 2019-09-27 RX ADMIN — DULOXETINE HYDROCHLORIDE 30 MG: 30 CAPSULE, DELAYED RELEASE ORAL at 10:00

## 2019-09-27 RX ADMIN — LACTULOSE 20 G: 20 SOLUTION ORAL at 20:57

## 2019-09-27 RX ADMIN — POTASSIUM CHLORIDE 10 MEQ: 7.46 INJECTION, SOLUTION INTRAVENOUS at 22:20

## 2019-09-27 RX ADMIN — RIFAXIMIN 550 MG: 550 TABLET ORAL at 20:57

## 2019-09-27 RX ADMIN — LACTULOSE 20 G: 20 SOLUTION ORAL at 00:36

## 2019-09-27 RX ADMIN — Medication 100 MG: at 10:00

## 2019-09-27 RX ADMIN — ENOXAPARIN SODIUM 40 MG: 40 INJECTION SUBCUTANEOUS at 10:00

## 2019-09-27 RX ADMIN — MIDODRINE HYDROCHLORIDE 10 MG: 10 TABLET ORAL at 18:50

## 2019-09-27 RX ADMIN — SODIUM CHLORIDE: 9 INJECTION, SOLUTION INTRAVENOUS at 09:59

## 2019-09-27 RX ADMIN — TAMSULOSIN HYDROCHLORIDE 0.4 MG: 0.4 CAPSULE ORAL at 00:36

## 2019-09-27 RX ADMIN — POTASSIUM CHLORIDE 20 MEQ: 20 TABLET, EXTENDED RELEASE ORAL at 10:01

## 2019-09-27 RX ADMIN — POTASSIUM CHLORIDE 40 MEQ: 750 TABLET, EXTENDED RELEASE ORAL at 15:46

## 2019-09-27 RX ADMIN — Medication 1 TABLET: at 10:00

## 2019-09-27 RX ADMIN — AMIODARONE HYDROCHLORIDE 200 MG: 200 TABLET ORAL at 10:00

## 2019-09-27 RX ADMIN — TAMSULOSIN HYDROCHLORIDE 0.4 MG: 0.4 CAPSULE ORAL at 20:57

## 2019-09-27 RX ADMIN — FAMOTIDINE 20 MG: 20 TABLET ORAL at 00:36

## 2019-09-27 ASSESSMENT — PAIN SCALES - GENERAL
PAINLEVEL_OUTOF10: 0
PAINLEVEL_OUTOF10: 0

## 2019-09-28 LAB
ALBUMIN SERPL-MCNC: 2.6 G/DL (ref 3.4–5)
ANION GAP SERPL CALCULATED.3IONS-SCNC: 11 MMOL/L (ref 3–16)
BUN BLDV-MCNC: 15 MG/DL (ref 7–20)
CALCIUM SERPL-MCNC: 8.5 MG/DL (ref 8.3–10.6)
CHLORIDE BLD-SCNC: 110 MMOL/L (ref 99–110)
CO2: 20 MMOL/L (ref 21–32)
CREAT SERPL-MCNC: 1 MG/DL (ref 0.8–1.3)
GFR AFRICAN AMERICAN: >60
GFR NON-AFRICAN AMERICAN: >60
GLUCOSE BLD-MCNC: 106 MG/DL (ref 70–99)
HCT VFR BLD CALC: 24.7 % (ref 40.5–52.5)
HEMOGLOBIN: 8.1 G/DL (ref 13.5–17.5)
MCH RBC QN AUTO: 33.2 PG (ref 26–34)
MCHC RBC AUTO-ENTMCNC: 33 G/DL (ref 31–36)
MCV RBC AUTO: 100.6 FL (ref 80–100)
PDW BLD-RTO: 19.1 % (ref 12.4–15.4)
PHOSPHORUS: 2.3 MG/DL (ref 2.5–4.9)
PLATELET # BLD: 59 K/UL (ref 135–450)
PLATELET SLIDE REVIEW: ABNORMAL
PMV BLD AUTO: 9.3 FL (ref 5–10.5)
POTASSIUM SERPL-SCNC: 3.1 MMOL/L (ref 3.5–5.1)
RBC # BLD: 2.46 M/UL (ref 4.2–5.9)
SLIDE REVIEW: ABNORMAL
SODIUM BLD-SCNC: 141 MMOL/L (ref 136–145)
URIC ACID, SERUM: 4.6 MG/DL (ref 3.5–7.2)
WBC # BLD: 2.6 K/UL (ref 4–11)

## 2019-09-28 PROCEDURE — 80069 RENAL FUNCTION PANEL: CPT

## 2019-09-28 PROCEDURE — 6360000002 HC RX W HCPCS: Performed by: INTERNAL MEDICINE

## 2019-09-28 PROCEDURE — 6370000000 HC RX 637 (ALT 250 FOR IP): Performed by: HOSPITALIST

## 2019-09-28 PROCEDURE — 84550 ASSAY OF BLOOD/URIC ACID: CPT

## 2019-09-28 PROCEDURE — 6370000000 HC RX 637 (ALT 250 FOR IP): Performed by: INTERNAL MEDICINE

## 2019-09-28 PROCEDURE — 97535 SELF CARE MNGMENT TRAINING: CPT

## 2019-09-28 PROCEDURE — 6360000002 HC RX W HCPCS: Performed by: HOSPITALIST

## 2019-09-28 PROCEDURE — P9045 ALBUMIN (HUMAN), 5%, 250 ML: HCPCS | Performed by: INTERNAL MEDICINE

## 2019-09-28 PROCEDURE — 97530 THERAPEUTIC ACTIVITIES: CPT

## 2019-09-28 PROCEDURE — 1200000000 HC SEMI PRIVATE

## 2019-09-28 PROCEDURE — 36415 COLL VENOUS BLD VENIPUNCTURE: CPT

## 2019-09-28 PROCEDURE — 2580000003 HC RX 258: Performed by: HOSPITALIST

## 2019-09-28 PROCEDURE — 85027 COMPLETE CBC AUTOMATED: CPT

## 2019-09-28 PROCEDURE — 97166 OT EVAL MOD COMPLEX 45 MIN: CPT

## 2019-09-28 RX ORDER — OXYCODONE HYDROCHLORIDE AND ACETAMINOPHEN 5; 325 MG/1; MG/1
1 TABLET ORAL EVERY 6 HOURS PRN
Status: DISCONTINUED | OUTPATIENT
Start: 2019-09-28 | End: 2019-09-29 | Stop reason: HOSPADM

## 2019-09-28 RX ADMIN — POTASSIUM CHLORIDE 10 MEQ: 7.46 INJECTION, SOLUTION INTRAVENOUS at 01:59

## 2019-09-28 RX ADMIN — RIFAXIMIN 550 MG: 550 TABLET ORAL at 08:14

## 2019-09-28 RX ADMIN — OXYCODONE HYDROCHLORIDE AND ACETAMINOPHEN 1 TABLET: 5; 325 TABLET ORAL at 18:05

## 2019-09-28 RX ADMIN — Medication 100 MG: at 08:14

## 2019-09-28 RX ADMIN — POTASSIUM CHLORIDE 10 MEQ: 7.46 INJECTION, SOLUTION INTRAVENOUS at 04:28

## 2019-09-28 RX ADMIN — POTASSIUM CHLORIDE 10 MEQ: 7.46 INJECTION, SOLUTION INTRAVENOUS at 00:23

## 2019-09-28 RX ADMIN — RIFAXIMIN 550 MG: 550 TABLET ORAL at 21:35

## 2019-09-28 RX ADMIN — SODIUM CHLORIDE: 9 INJECTION, SOLUTION INTRAVENOUS at 12:23

## 2019-09-28 RX ADMIN — ENOXAPARIN SODIUM 40 MG: 40 INJECTION SUBCUTANEOUS at 08:14

## 2019-09-28 RX ADMIN — LACTULOSE 20 G: 20 SOLUTION ORAL at 08:14

## 2019-09-28 RX ADMIN — MIDODRINE HYDROCHLORIDE 10 MG: 10 TABLET ORAL at 08:14

## 2019-09-28 RX ADMIN — AMIODARONE HYDROCHLORIDE 200 MG: 200 TABLET ORAL at 08:14

## 2019-09-28 RX ADMIN — POTASSIUM CHLORIDE 40 MEQ: 1500 TABLET, EXTENDED RELEASE ORAL at 08:14

## 2019-09-28 RX ADMIN — ALBUMIN (HUMAN) 12.5 G: 12.5 INJECTION, SOLUTION INTRAVENOUS at 03:39

## 2019-09-28 RX ADMIN — LACTULOSE 20 G: 20 SOLUTION ORAL at 14:33

## 2019-09-28 RX ADMIN — DULOXETINE HYDROCHLORIDE 30 MG: 30 CAPSULE, DELAYED RELEASE ORAL at 08:14

## 2019-09-28 RX ADMIN — Medication 1 TABLET: at 08:14

## 2019-09-28 RX ADMIN — SODIUM CHLORIDE, PRESERVATIVE FREE 10 ML: 5 INJECTION INTRAVENOUS at 21:35

## 2019-09-28 RX ADMIN — TAMSULOSIN HYDROCHLORIDE 0.4 MG: 0.4 CAPSULE ORAL at 21:35

## 2019-09-28 RX ADMIN — FAMOTIDINE 20 MG: 20 TABLET, FILM COATED ORAL at 08:14

## 2019-09-28 RX ADMIN — MIDODRINE HYDROCHLORIDE 10 MG: 10 TABLET ORAL at 17:16

## 2019-09-28 ASSESSMENT — PAIN DESCRIPTION - ONSET: ONSET: GRADUAL

## 2019-09-28 ASSESSMENT — PAIN DESCRIPTION - DESCRIPTORS
DESCRIPTORS: SHARP
DESCRIPTORS: SHARP

## 2019-09-28 ASSESSMENT — PAIN DESCRIPTION - PAIN TYPE
TYPE: ACUTE PAIN
TYPE: ACUTE PAIN

## 2019-09-28 ASSESSMENT — PAIN DESCRIPTION - FREQUENCY
FREQUENCY: INTERMITTENT
FREQUENCY: INTERMITTENT

## 2019-09-28 ASSESSMENT — PAIN DESCRIPTION - PROGRESSION: CLINICAL_PROGRESSION: GRADUALLY WORSENING

## 2019-09-28 ASSESSMENT — PAIN SCALES - GENERAL
PAINLEVEL_OUTOF10: 6
PAINLEVEL_OUTOF10: 3
PAINLEVEL_OUTOF10: 0
PAINLEVEL_OUTOF10: 2

## 2019-09-28 ASSESSMENT — PAIN - FUNCTIONAL ASSESSMENT
PAIN_FUNCTIONAL_ASSESSMENT: PREVENTS OR INTERFERES SOME ACTIVE ACTIVITIES AND ADLS
PAIN_FUNCTIONAL_ASSESSMENT: ACTIVITIES ARE NOT PREVENTED

## 2019-09-28 ASSESSMENT — PAIN DESCRIPTION - LOCATION
LOCATION: ABDOMEN
LOCATION: ABDOMEN

## 2019-09-28 ASSESSMENT — PAIN DESCRIPTION - ORIENTATION: ORIENTATION: MID

## 2019-09-29 VITALS
HEART RATE: 69 BPM | OXYGEN SATURATION: 94 % | HEIGHT: 69 IN | RESPIRATION RATE: 16 BRPM | WEIGHT: 192.9 LBS | BODY MASS INDEX: 28.57 KG/M2 | SYSTOLIC BLOOD PRESSURE: 134 MMHG | TEMPERATURE: 97.9 F | DIASTOLIC BLOOD PRESSURE: 61 MMHG

## 2019-09-29 LAB
ALBUMIN SERPL-MCNC: 2.5 G/DL (ref 3.4–5)
ANION GAP SERPL CALCULATED.3IONS-SCNC: 9 MMOL/L (ref 3–16)
BUN BLDV-MCNC: 11 MG/DL (ref 7–20)
CALCIUM SERPL-MCNC: 8.6 MG/DL (ref 8.3–10.6)
CHLORIDE BLD-SCNC: 112 MMOL/L (ref 99–110)
CO2: 20 MMOL/L (ref 21–32)
CREAT SERPL-MCNC: 1.1 MG/DL (ref 0.8–1.3)
FERRITIN: 33.1 NG/ML (ref 30–400)
GFR AFRICAN AMERICAN: >60
GFR NON-AFRICAN AMERICAN: >60
GLUCOSE BLD-MCNC: 102 MG/DL (ref 70–99)
HCT VFR BLD CALC: 25.5 % (ref 40.5–52.5)
HEMOGLOBIN: 8.3 G/DL (ref 13.5–17.5)
IRON SATURATION: 13 % (ref 20–50)
IRON: 33 UG/DL (ref 59–158)
MCH RBC QN AUTO: 32.9 PG (ref 26–34)
MCHC RBC AUTO-ENTMCNC: 32.5 G/DL (ref 31–36)
MCV RBC AUTO: 101.2 FL (ref 80–100)
PDW BLD-RTO: 19.1 % (ref 12.4–15.4)
PHOSPHORUS: 2.5 MG/DL (ref 2.5–4.9)
PLATELET # BLD: 61 K/UL (ref 135–450)
PMV BLD AUTO: 8.5 FL (ref 5–10.5)
POTASSIUM SERPL-SCNC: 3.1 MMOL/L (ref 3.5–5.1)
RBC # BLD: 2.52 M/UL (ref 4.2–5.9)
SODIUM BLD-SCNC: 141 MMOL/L (ref 136–145)
TOTAL IRON BINDING CAPACITY: 263 UG/DL (ref 260–445)
URIC ACID, SERUM: 3.8 MG/DL (ref 3.5–7.2)
WBC # BLD: 2.8 K/UL (ref 4–11)

## 2019-09-29 PROCEDURE — 83550 IRON BINDING TEST: CPT

## 2019-09-29 PROCEDURE — 82728 ASSAY OF FERRITIN: CPT

## 2019-09-29 PROCEDURE — 6360000002 HC RX W HCPCS: Performed by: HOSPITALIST

## 2019-09-29 PROCEDURE — 2580000003 HC RX 258: Performed by: HOSPITALIST

## 2019-09-29 PROCEDURE — 6370000000 HC RX 637 (ALT 250 FOR IP): Performed by: INTERNAL MEDICINE

## 2019-09-29 PROCEDURE — 85027 COMPLETE CBC AUTOMATED: CPT

## 2019-09-29 PROCEDURE — 6370000000 HC RX 637 (ALT 250 FOR IP): Performed by: HOSPITALIST

## 2019-09-29 PROCEDURE — 80069 RENAL FUNCTION PANEL: CPT

## 2019-09-29 PROCEDURE — 84550 ASSAY OF BLOOD/URIC ACID: CPT

## 2019-09-29 PROCEDURE — 83540 ASSAY OF IRON: CPT

## 2019-09-29 RX ORDER — OXYCODONE HYDROCHLORIDE AND ACETAMINOPHEN 5; 325 MG/1; MG/1
1 TABLET ORAL EVERY 8 HOURS PRN
Qty: 5 TABLET | Refills: 0 | Status: SHIPPED | OUTPATIENT
Start: 2019-09-29 | End: 2019-10-03

## 2019-09-29 RX ORDER — POTASSIUM CHLORIDE 20 MEQ/1
10 TABLET, EXTENDED RELEASE ORAL 2 TIMES DAILY
Qty: 60 TABLET | Refills: 0 | Status: ON HOLD | OUTPATIENT
Start: 2019-09-29 | End: 2019-12-04 | Stop reason: HOSPADM

## 2019-09-29 RX ORDER — POTASSIUM CHLORIDE 20 MEQ/1
40 TABLET, EXTENDED RELEASE ORAL ONCE
Status: COMPLETED | OUTPATIENT
Start: 2019-09-29 | End: 2019-09-29

## 2019-09-29 RX ADMIN — AMIODARONE HYDROCHLORIDE 200 MG: 200 TABLET ORAL at 08:42

## 2019-09-29 RX ADMIN — MIDODRINE HYDROCHLORIDE 10 MG: 10 TABLET ORAL at 08:42

## 2019-09-29 RX ADMIN — Medication 1 TABLET: at 11:35

## 2019-09-29 RX ADMIN — Medication 100 MG: at 08:42

## 2019-09-29 RX ADMIN — SODIUM CHLORIDE, PRESERVATIVE FREE 10 ML: 5 INJECTION INTRAVENOUS at 08:43

## 2019-09-29 RX ADMIN — RIFAXIMIN 550 MG: 550 TABLET ORAL at 08:42

## 2019-09-29 RX ADMIN — FAMOTIDINE 20 MG: 20 TABLET, FILM COATED ORAL at 08:42

## 2019-09-29 RX ADMIN — MIDODRINE HYDROCHLORIDE 10 MG: 10 TABLET ORAL at 17:04

## 2019-09-29 RX ADMIN — POTASSIUM CHLORIDE 40 MEQ: 1500 TABLET, EXTENDED RELEASE ORAL at 15:31

## 2019-09-29 RX ADMIN — POTASSIUM CHLORIDE 40 MEQ: 1500 TABLET, EXTENDED RELEASE ORAL at 08:42

## 2019-09-29 RX ADMIN — LACTULOSE 20 G: 20 SOLUTION ORAL at 15:31

## 2019-09-29 RX ADMIN — DULOXETINE HYDROCHLORIDE 30 MG: 30 CAPSULE, DELAYED RELEASE ORAL at 08:42

## 2019-09-29 RX ADMIN — ENOXAPARIN SODIUM 40 MG: 40 INJECTION SUBCUTANEOUS at 08:42

## 2019-09-29 RX ADMIN — OXYCODONE HYDROCHLORIDE AND ACETAMINOPHEN 1 TABLET: 5; 325 TABLET ORAL at 17:06

## 2019-09-29 RX ADMIN — LACTULOSE 20 G: 20 SOLUTION ORAL at 08:42

## 2019-09-29 ASSESSMENT — PAIN DESCRIPTION - LOCATION: LOCATION: ABDOMEN

## 2019-09-29 ASSESSMENT — PAIN DESCRIPTION - PAIN TYPE: TYPE: CHRONIC PAIN

## 2019-09-29 ASSESSMENT — PAIN DESCRIPTION - DESCRIPTORS: DESCRIPTORS: DISCOMFORT;TIGHTNESS

## 2019-09-29 ASSESSMENT — PAIN - FUNCTIONAL ASSESSMENT: PAIN_FUNCTIONAL_ASSESSMENT: PREVENTS OR INTERFERES SOME ACTIVE ACTIVITIES AND ADLS

## 2019-09-29 ASSESSMENT — PAIN SCALES - GENERAL
PAINLEVEL_OUTOF10: 7
PAINLEVEL_OUTOF10: 2
PAINLEVEL_OUTOF10: 0

## 2019-09-29 ASSESSMENT — PAIN DESCRIPTION - DIRECTION: RADIATING_TOWARDS: RIGHT SIDE

## 2019-09-29 ASSESSMENT — PAIN DESCRIPTION - ONSET: ONSET: GRADUAL

## 2019-09-29 ASSESSMENT — PAIN DESCRIPTION - FREQUENCY: FREQUENCY: INTERMITTENT

## 2019-09-29 ASSESSMENT — PAIN DESCRIPTION - ORIENTATION: ORIENTATION: LOWER

## 2019-09-29 ASSESSMENT — PAIN DESCRIPTION - PROGRESSION: CLINICAL_PROGRESSION: GRADUALLY WORSENING

## 2019-10-11 ENCOUNTER — HOSPITAL ENCOUNTER (EMERGENCY)
Age: 69
Discharge: HOME | End: 2019-10-11
Payer: MEDICARE

## 2019-10-11 VITALS
BODY MASS INDEX: 24.9 KG/M2 | OXYGEN SATURATION: 95 % | WEIGHT: 183.86 LBS | HEIGHT: 72 IN | DIASTOLIC BLOOD PRESSURE: 59 MMHG | SYSTOLIC BLOOD PRESSURE: 115 MMHG | HEART RATE: 62 BPM | RESPIRATION RATE: 12 BRPM | TEMPERATURE: 98.9 F

## 2019-10-11 DIAGNOSIS — R53.1 GENERALIZED WEAKNESS: Primary | ICD-10-CM

## 2019-10-11 LAB
A/G RATIO: 0.7 (ref 1.1–2.2)
ALBUMIN SERPL-MCNC: 3.4 G/DL (ref 3.4–5)
ALP BLD-CCNC: 127 U/L (ref 40–129)
ALT SERPL-CCNC: 46 U/L (ref 10–40)
AMMONIA: 26 UMOL/L (ref 16–60)
ANION GAP SERPL CALCULATED.3IONS-SCNC: 14 MMOL/L (ref 3–16)
AST SERPL-CCNC: 100 U/L (ref 15–37)
BASOPHILS ABSOLUTE: 0.1 K/UL (ref 0–0.2)
BASOPHILS RELATIVE PERCENT: 1.4 %
BILIRUB SERPL-MCNC: 1.7 MG/DL (ref 0–1)
BUN BLDV-MCNC: 19 MG/DL (ref 7–20)
CALCIUM SERPL-MCNC: 10 MG/DL (ref 8.3–10.6)
CHLORIDE BLD-SCNC: 96 MMOL/L (ref 99–110)
CO2: 22 MMOL/L (ref 21–32)
CREAT SERPL-MCNC: 1.4 MG/DL (ref 0.8–1.3)
EOSINOPHILS ABSOLUTE: 0.1 K/UL (ref 0–0.6)
EOSINOPHILS RELATIVE PERCENT: 2.1 %
GFR AFRICAN AMERICAN: >60
GFR NON-AFRICAN AMERICAN: 50
GLOBULIN: 4.7 G/DL
GLUCOSE BLD-MCNC: 145 MG/DL (ref 70–99)
HCT VFR BLD CALC: 34.6 % (ref 40.5–52.5)
HEMOGLOBIN: 11.4 G/DL (ref 13.5–17.5)
LIPASE: 41 U/L (ref 13–60)
LYMPHOCYTES ABSOLUTE: 1.4 K/UL (ref 1–5.1)
LYMPHOCYTES RELATIVE PERCENT: 24.9 %
MCH RBC QN AUTO: 33 PG (ref 26–34)
MCHC RBC AUTO-ENTMCNC: 32.8 G/DL (ref 31–36)
MCV RBC AUTO: 100.4 FL (ref 80–100)
MONOCYTES ABSOLUTE: 0.7 K/UL (ref 0–1.3)
MONOCYTES RELATIVE PERCENT: 12.3 %
NEUTROPHILS ABSOLUTE: 3.3 K/UL (ref 1.7–7.7)
NEUTROPHILS RELATIVE PERCENT: 59.3 %
PDW BLD-RTO: 19.4 % (ref 12.4–15.4)
PLATELET # BLD: 97 K/UL (ref 135–450)
PLATELET SLIDE REVIEW: ABNORMAL
PMV BLD AUTO: 10 FL (ref 5–10.5)
POTASSIUM SERPL-SCNC: 4.3 MMOL/L (ref 3.5–5.1)
RBC # BLD: 3.45 M/UL (ref 4.2–5.9)
SLIDE REVIEW: ABNORMAL
SODIUM BLD-SCNC: 132 MMOL/L (ref 136–145)
TOTAL PROTEIN: 8.1 G/DL (ref 6.4–8.2)
WBC # BLD: 5.6 K/UL (ref 4–11)

## 2019-10-11 PROCEDURE — 99285 EMERGENCY DEPT VISIT HI MDM: CPT

## 2019-10-11 PROCEDURE — 80053 COMPREHEN METABOLIC PANEL: CPT

## 2019-10-11 PROCEDURE — 83690 ASSAY OF LIPASE: CPT

## 2019-10-11 PROCEDURE — 82140 ASSAY OF AMMONIA: CPT

## 2019-10-11 PROCEDURE — 85025 COMPLETE CBC W/AUTO DIFF WBC: CPT

## 2019-10-11 ASSESSMENT — PAIN SCALES - GENERAL
PAINLEVEL_OUTOF10: 2

## 2019-10-11 ASSESSMENT — PAIN DESCRIPTION - LOCATION
LOCATION: OTHER (COMMENT)
LOCATION: OTHER (COMMENT)

## 2019-10-11 ASSESSMENT — PAIN - FUNCTIONAL ASSESSMENT
PAIN_FUNCTIONAL_ASSESSMENT: ACTIVITIES ARE NOT PREVENTED
PAIN_FUNCTIONAL_ASSESSMENT: ACTIVITIES ARE NOT PREVENTED

## 2019-10-11 ASSESSMENT — PAIN DESCRIPTION - PAIN TYPE
TYPE: CHRONIC PAIN
TYPE: CHRONIC PAIN

## 2019-10-11 ASSESSMENT — PAIN DESCRIPTION - FREQUENCY
FREQUENCY: CONTINUOUS
FREQUENCY: INTERMITTENT

## 2019-10-11 ASSESSMENT — PAIN DESCRIPTION - DESCRIPTORS: DESCRIPTORS: ACHING

## 2019-10-11 ASSESSMENT — PAIN DESCRIPTION - ORIENTATION: ORIENTATION: OUTER

## 2019-10-11 ASSESSMENT — PAIN DESCRIPTION - ONSET
ONSET: ON-GOING
ONSET: ON-GOING

## 2019-10-11 ASSESSMENT — PAIN DESCRIPTION - PROGRESSION
CLINICAL_PROGRESSION: NOT CHANGED
CLINICAL_PROGRESSION: NOT CHANGED

## 2019-10-12 ASSESSMENT — ENCOUNTER SYMPTOMS
GASTROINTESTINAL NEGATIVE: 1
RESPIRATORY NEGATIVE: 1

## 2019-11-25 ENCOUNTER — APPOINTMENT (OUTPATIENT)
Dept: CT IMAGING | Age: 69
DRG: 698 | End: 2019-11-25
Payer: MEDICARE

## 2019-11-25 ENCOUNTER — APPOINTMENT (OUTPATIENT)
Dept: GENERAL RADIOLOGY | Age: 69
DRG: 698 | End: 2019-11-25
Payer: MEDICARE

## 2019-11-25 ENCOUNTER — HOSPITAL ENCOUNTER (INPATIENT)
Age: 69
LOS: 9 days | Discharge: HOSPICE | DRG: 698 | End: 2019-12-04
Attending: EMERGENCY MEDICINE | Admitting: INTERNAL MEDICINE
Payer: MEDICARE

## 2019-11-25 DIAGNOSIS — K76.82 HEPATIC ENCEPHALOPATHY: Primary | ICD-10-CM

## 2019-11-25 DIAGNOSIS — N30.00 ACUTE CYSTITIS WITHOUT HEMATURIA: ICD-10-CM

## 2019-11-25 DIAGNOSIS — K52.9 COLITIS: ICD-10-CM

## 2019-11-25 LAB
ALBUMIN SERPL-MCNC: 2.5 G/DL (ref 3.4–5)
ALP BLD-CCNC: 109 U/L (ref 40–129)
ALT SERPL-CCNC: 24 U/L (ref 10–40)
AMMONIA: 140 UMOL/L (ref 16–60)
ANION GAP SERPL CALCULATED.3IONS-SCNC: 9 MMOL/L (ref 3–16)
AST SERPL-CCNC: 47 U/L (ref 15–37)
BACTERIA: ABNORMAL /HPF
BASE EXCESS VENOUS: 2.3 MMOL/L
BASOPHILS ABSOLUTE: 0 K/UL (ref 0–0.2)
BASOPHILS RELATIVE PERCENT: 1.2 %
BILIRUB SERPL-MCNC: 3.1 MG/DL (ref 0–1)
BILIRUBIN DIRECT: 1.1 MG/DL (ref 0–0.3)
BILIRUBIN URINE: ABNORMAL
BILIRUBIN, INDIRECT: 2 MG/DL (ref 0–1)
BLOOD, URINE: ABNORMAL
BUN BLDV-MCNC: 11 MG/DL (ref 7–20)
CALCIUM SERPL-MCNC: 9.5 MG/DL (ref 8.3–10.6)
CARBOXYHEMOGLOBIN: 2 %
CHLORIDE BLD-SCNC: 104 MMOL/L (ref 99–110)
CLARITY: ABNORMAL
CO2: 26 MMOL/L (ref 21–32)
COLOR: ABNORMAL
COMMENT UA: ABNORMAL
CREAT SERPL-MCNC: 0.9 MG/DL (ref 0.8–1.3)
CRYSTALS, UA: ABNORMAL /HPF
EOSINOPHILS ABSOLUTE: 0.1 K/UL (ref 0–0.6)
EOSINOPHILS RELATIVE PERCENT: 2.5 %
EPITHELIAL CELLS, UA: 0 /HPF (ref 0–5)
GFR AFRICAN AMERICAN: >60
GFR NON-AFRICAN AMERICAN: >60
GLUCOSE BLD-MCNC: 118 MG/DL (ref 70–99)
GLUCOSE URINE: NEGATIVE MG/DL
HCO3 VENOUS: 28 MMOL/L (ref 23–29)
HCT VFR BLD CALC: 39.1 % (ref 40.5–52.5)
HEMOGLOBIN: 13.2 G/DL (ref 13.5–17.5)
HYALINE CASTS: 8 /LPF (ref 0–8)
KETONES, URINE: ABNORMAL MG/DL
LACTIC ACID: 2.3 MMOL/L (ref 0.4–2)
LEUKOCYTE ESTERASE, URINE: ABNORMAL
LIPASE: 33 U/L (ref 13–60)
LYMPHOCYTES ABSOLUTE: 1.4 K/UL (ref 1–5.1)
LYMPHOCYTES RELATIVE PERCENT: 38.1 %
MAGNESIUM: 1.8 MG/DL (ref 1.8–2.4)
MCH RBC QN AUTO: 33.9 PG (ref 26–34)
MCHC RBC AUTO-ENTMCNC: 33.8 G/DL (ref 31–36)
MCV RBC AUTO: 100.3 FL (ref 80–100)
METHEMOGLOBIN VENOUS: 0.5 %
MICROSCOPIC EXAMINATION: YES
MONOCYTES ABSOLUTE: 0.5 K/UL (ref 0–1.3)
MONOCYTES RELATIVE PERCENT: 12 %
NEUTROPHILS ABSOLUTE: 1.7 K/UL (ref 1.7–7.7)
NEUTROPHILS RELATIVE PERCENT: 46.2 %
NITRITE, URINE: NEGATIVE
O2 CONTENT, VEN: 14 ML/DL
O2 SAT, VEN: 78 %
O2 THERAPY: NORMAL
PCO2, VEN: 49.6 MMHG (ref 40–50)
PDW BLD-RTO: 21.5 % (ref 12.4–15.4)
PH UA: 6 (ref 5–8)
PH VENOUS: 7.37 (ref 7.35–7.45)
PLATELET # BLD: 83 K/UL (ref 135–450)
PMV BLD AUTO: 8.4 FL (ref 5–10.5)
PO2, VEN: 48 MMHG
POTASSIUM REFLEX MAGNESIUM: 3.1 MMOL/L (ref 3.5–5.1)
PRO-BNP: 277 PG/ML (ref 0–124)
PROTEIN UA: 100 MG/DL
RBC # BLD: 3.9 M/UL (ref 4.2–5.9)
RBC UA: ABNORMAL /HPF (ref 0–2)
SODIUM BLD-SCNC: 139 MMOL/L (ref 136–145)
SPECIFIC GRAVITY UA: 1.02 (ref 1–1.03)
TCO2 CALC VENOUS: 29 MMOL/L
TOTAL CK: 47 U/L (ref 39–308)
TOTAL PROTEIN: 6.7 G/DL (ref 6.4–8.2)
TROPONIN: <0.01 NG/ML
TSH REFLEX: 1.48 UIU/ML (ref 0.27–4.2)
URINE REFLEX TO CULTURE: YES
URINE TYPE: ABNORMAL
UROBILINOGEN, URINE: 2 E.U./DL
WBC # BLD: 3.8 K/UL (ref 4–11)
WBC UA: 534 /HPF (ref 0–5)

## 2019-11-25 PROCEDURE — 83690 ASSAY OF LIPASE: CPT

## 2019-11-25 PROCEDURE — 82803 BLOOD GASES ANY COMBINATION: CPT

## 2019-11-25 PROCEDURE — 87077 CULTURE AEROBIC IDENTIFY: CPT

## 2019-11-25 PROCEDURE — 6360000002 HC RX W HCPCS: Performed by: EMERGENCY MEDICINE

## 2019-11-25 PROCEDURE — 80076 HEPATIC FUNCTION PANEL: CPT

## 2019-11-25 PROCEDURE — 83880 ASSAY OF NATRIURETIC PEPTIDE: CPT

## 2019-11-25 PROCEDURE — 96365 THER/PROPH/DIAG IV INF INIT: CPT

## 2019-11-25 PROCEDURE — 84443 ASSAY THYROID STIM HORMONE: CPT

## 2019-11-25 PROCEDURE — 96361 HYDRATE IV INFUSION ADD-ON: CPT

## 2019-11-25 PROCEDURE — 6370000000 HC RX 637 (ALT 250 FOR IP): Performed by: INTERNAL MEDICINE

## 2019-11-25 PROCEDURE — 36415 COLL VENOUS BLD VENIPUNCTURE: CPT

## 2019-11-25 PROCEDURE — 99285 EMERGENCY DEPT VISIT HI MDM: CPT

## 2019-11-25 PROCEDURE — 70450 CT HEAD/BRAIN W/O DYE: CPT

## 2019-11-25 PROCEDURE — 81001 URINALYSIS AUTO W/SCOPE: CPT

## 2019-11-25 PROCEDURE — 71045 X-RAY EXAM CHEST 1 VIEW: CPT

## 2019-11-25 PROCEDURE — 80048 BASIC METABOLIC PNL TOTAL CA: CPT

## 2019-11-25 PROCEDURE — 93005 ELECTROCARDIOGRAM TRACING: CPT | Performed by: EMERGENCY MEDICINE

## 2019-11-25 PROCEDURE — 2580000003 HC RX 258: Performed by: EMERGENCY MEDICINE

## 2019-11-25 PROCEDURE — 83735 ASSAY OF MAGNESIUM: CPT

## 2019-11-25 PROCEDURE — 83605 ASSAY OF LACTIC ACID: CPT

## 2019-11-25 PROCEDURE — 82550 ASSAY OF CK (CPK): CPT

## 2019-11-25 PROCEDURE — 87086 URINE CULTURE/COLONY COUNT: CPT

## 2019-11-25 PROCEDURE — 2060000000 HC ICU INTERMEDIATE R&B

## 2019-11-25 PROCEDURE — 2580000003 HC RX 258: Performed by: INTERNAL MEDICINE

## 2019-11-25 PROCEDURE — 6360000004 HC RX CONTRAST MEDICATION: Performed by: EMERGENCY MEDICINE

## 2019-11-25 PROCEDURE — 74177 CT ABD & PELVIS W/CONTRAST: CPT

## 2019-11-25 PROCEDURE — 6370000000 HC RX 637 (ALT 250 FOR IP): Performed by: EMERGENCY MEDICINE

## 2019-11-25 PROCEDURE — 87186 SC STD MICRODIL/AGAR DIL: CPT

## 2019-11-25 PROCEDURE — 85025 COMPLETE CBC W/AUTO DIFF WBC: CPT

## 2019-11-25 PROCEDURE — 82140 ASSAY OF AMMONIA: CPT

## 2019-11-25 PROCEDURE — 96367 TX/PROPH/DG ADDL SEQ IV INF: CPT

## 2019-11-25 PROCEDURE — 84484 ASSAY OF TROPONIN QUANT: CPT

## 2019-11-25 RX ORDER — TAMSULOSIN HYDROCHLORIDE 0.4 MG/1
0.4 CAPSULE ORAL NIGHTLY
Status: DISCONTINUED | OUTPATIENT
Start: 2019-11-25 | End: 2019-12-04 | Stop reason: HOSPADM

## 2019-11-25 RX ORDER — POTASSIUM CHLORIDE 7.45 MG/ML
10 INJECTION INTRAVENOUS
Status: COMPLETED | OUTPATIENT
Start: 2019-11-25 | End: 2019-11-26

## 2019-11-25 RX ORDER — LACTULOSE 10 G/15ML
20 SOLUTION ORAL ONCE
Status: COMPLETED | OUTPATIENT
Start: 2019-11-25 | End: 2019-11-25

## 2019-11-25 RX ORDER — SODIUM CHLORIDE 0.9 % (FLUSH) 0.9 %
10 SYRINGE (ML) INJECTION PRN
Status: DISCONTINUED | OUTPATIENT
Start: 2019-11-25 | End: 2019-12-04 | Stop reason: HOSPADM

## 2019-11-25 RX ORDER — ONDANSETRON 2 MG/ML
4 INJECTION INTRAMUSCULAR; INTRAVENOUS EVERY 6 HOURS PRN
Status: DISCONTINUED | OUTPATIENT
Start: 2019-11-25 | End: 2019-12-04 | Stop reason: HOSPADM

## 2019-11-25 RX ORDER — SODIUM CHLORIDE, SODIUM LACTATE, POTASSIUM CHLORIDE, CALCIUM CHLORIDE 600; 310; 30; 20 MG/100ML; MG/100ML; MG/100ML; MG/100ML
INJECTION, SOLUTION INTRAVENOUS CONTINUOUS
Status: DISCONTINUED | OUTPATIENT
Start: 2019-11-25 | End: 2019-11-26

## 2019-11-25 RX ORDER — LACTULOSE 10 G/15ML
20 SOLUTION ORAL 3 TIMES DAILY
Status: DISCONTINUED | OUTPATIENT
Start: 2019-11-25 | End: 2019-12-04 | Stop reason: HOSPADM

## 2019-11-25 RX ORDER — FOLIC ACID 1 MG/1
1 TABLET ORAL DAILY
Status: DISCONTINUED | OUTPATIENT
Start: 2019-11-26 | End: 2019-12-04 | Stop reason: HOSPADM

## 2019-11-25 RX ORDER — 0.9 % SODIUM CHLORIDE 0.9 %
1000 INTRAVENOUS SOLUTION INTRAVENOUS ONCE
Status: COMPLETED | OUTPATIENT
Start: 2019-11-25 | End: 2019-11-25

## 2019-11-25 RX ORDER — DULOXETIN HYDROCHLORIDE 30 MG/1
30 CAPSULE, DELAYED RELEASE ORAL DAILY
Status: DISCONTINUED | OUTPATIENT
Start: 2019-11-26 | End: 2019-12-04 | Stop reason: HOSPADM

## 2019-11-25 RX ORDER — LACTULOSE 10 G/15ML
20 SOLUTION ORAL ONCE
Status: DISCONTINUED | OUTPATIENT
Start: 2019-11-25 | End: 2019-11-25

## 2019-11-25 RX ORDER — AMIODARONE HYDROCHLORIDE 200 MG/1
200 TABLET ORAL DAILY
Status: DISCONTINUED | OUTPATIENT
Start: 2019-11-26 | End: 2019-12-04 | Stop reason: HOSPADM

## 2019-11-25 RX ORDER — SODIUM CHLORIDE 0.9 % (FLUSH) 0.9 %
10 SYRINGE (ML) INJECTION EVERY 12 HOURS SCHEDULED
Status: DISCONTINUED | OUTPATIENT
Start: 2019-11-25 | End: 2019-12-04 | Stop reason: HOSPADM

## 2019-11-25 RX ORDER — NADOLOL 40 MG/1
20 TABLET ORAL DAILY
Status: DISCONTINUED | OUTPATIENT
Start: 2019-11-26 | End: 2019-12-04 | Stop reason: HOSPADM

## 2019-11-25 RX ADMIN — IOPAMIDOL 75 ML: 755 INJECTION, SOLUTION INTRAVENOUS at 18:50

## 2019-11-25 RX ADMIN — WATER: 100 IRRIGANT IRRIGATION at 23:18

## 2019-11-25 RX ADMIN — Medication 10 MEQ: at 23:26

## 2019-11-25 RX ADMIN — Medication 10 MEQ: at 20:56

## 2019-11-25 RX ADMIN — MEROPENEM 1 G: 1 INJECTION, POWDER, FOR SOLUTION INTRAVENOUS at 20:25

## 2019-11-25 RX ADMIN — LACTULOSE 20 G: 20 SOLUTION ORAL at 20:27

## 2019-11-25 RX ADMIN — SODIUM CHLORIDE, POTASSIUM CHLORIDE, SODIUM LACTATE AND CALCIUM CHLORIDE: 600; 310; 30; 20 INJECTION, SOLUTION INTRAVENOUS at 22:17

## 2019-11-25 RX ADMIN — SODIUM CHLORIDE 1000 ML: 9 INJECTION, SOLUTION INTRAVENOUS at 18:17

## 2019-11-25 RX ADMIN — Medication 10 MEQ: at 22:17

## 2019-11-26 LAB
ANION GAP SERPL CALCULATED.3IONS-SCNC: 10 MMOL/L (ref 3–16)
BUN BLDV-MCNC: 10 MG/DL (ref 7–20)
CALCIUM SERPL-MCNC: 9.2 MG/DL (ref 8.3–10.6)
CHLORIDE BLD-SCNC: 113 MMOL/L (ref 99–110)
CO2: 23 MMOL/L (ref 21–32)
CREAT SERPL-MCNC: 0.8 MG/DL (ref 0.8–1.3)
EKG ATRIAL RATE: 68 BPM
EKG DIAGNOSIS: NORMAL
EKG P AXIS: 25 DEGREES
EKG P-R INTERVAL: 178 MS
EKG Q-T INTERVAL: 590 MS
EKG QRS DURATION: 98 MS
EKG QTC CALCULATION (BAZETT): 627 MS
EKG R AXIS: 26 DEGREES
EKG T AXIS: 48 DEGREES
EKG VENTRICULAR RATE: 68 BPM
GFR AFRICAN AMERICAN: >60
GFR NON-AFRICAN AMERICAN: >60
GLUCOSE BLD-MCNC: 100 MG/DL (ref 70–99)
MAGNESIUM: 1.7 MG/DL (ref 1.8–2.4)
POTASSIUM REFLEX MAGNESIUM: 2.9 MMOL/L (ref 3.5–5.1)
SODIUM BLD-SCNC: 146 MMOL/L (ref 136–145)

## 2019-11-26 PROCEDURE — 6370000000 HC RX 637 (ALT 250 FOR IP): Performed by: INTERNAL MEDICINE

## 2019-11-26 PROCEDURE — 6360000002 HC RX W HCPCS: Performed by: EMERGENCY MEDICINE

## 2019-11-26 PROCEDURE — 6370000000 HC RX 637 (ALT 250 FOR IP): Performed by: HOSPITALIST

## 2019-11-26 PROCEDURE — 6360000002 HC RX W HCPCS: Performed by: INTERNAL MEDICINE

## 2019-11-26 PROCEDURE — 36415 COLL VENOUS BLD VENIPUNCTURE: CPT

## 2019-11-26 PROCEDURE — 93010 ELECTROCARDIOGRAM REPORT: CPT | Performed by: INTERNAL MEDICINE

## 2019-11-26 PROCEDURE — 80048 BASIC METABOLIC PNL TOTAL CA: CPT

## 2019-11-26 PROCEDURE — 2060000000 HC ICU INTERMEDIATE R&B

## 2019-11-26 PROCEDURE — 92610 EVALUATE SWALLOWING FUNCTION: CPT

## 2019-11-26 PROCEDURE — 2580000003 HC RX 258: Performed by: HOSPITALIST

## 2019-11-26 PROCEDURE — 94760 N-INVAS EAR/PLS OXIMETRY 1: CPT

## 2019-11-26 PROCEDURE — 83735 ASSAY OF MAGNESIUM: CPT

## 2019-11-26 PROCEDURE — 2580000003 HC RX 258: Performed by: INTERNAL MEDICINE

## 2019-11-26 RX ORDER — SODIUM CHLORIDE 9 MG/ML
INJECTION, SOLUTION INTRAVENOUS CONTINUOUS
Status: DISCONTINUED | OUTPATIENT
Start: 2019-11-26 | End: 2019-12-02

## 2019-11-26 RX ORDER — THIAMINE HYDROCHLORIDE 100 MG/ML
500 INJECTION, SOLUTION INTRAMUSCULAR; INTRAVENOUS ONCE
Status: DISCONTINUED | OUTPATIENT
Start: 2019-11-26 | End: 2019-11-26

## 2019-11-26 RX ORDER — POTASSIUM CHLORIDE 750 MG/1
40 TABLET, FILM COATED, EXTENDED RELEASE ORAL EVERY 4 HOURS
Status: COMPLETED | OUTPATIENT
Start: 2019-11-26 | End: 2019-11-26

## 2019-11-26 RX ADMIN — FOLIC ACID 1 MG: 1 TABLET ORAL at 09:30

## 2019-11-26 RX ADMIN — RIFAXIMIN 550 MG: 550 TABLET ORAL at 22:51

## 2019-11-26 RX ADMIN — ONDANSETRON 4 MG: 2 INJECTION INTRAMUSCULAR; INTRAVENOUS at 15:24

## 2019-11-26 RX ADMIN — DULOXETINE HYDROCHLORIDE 30 MG: 30 CAPSULE, DELAYED RELEASE ORAL at 09:30

## 2019-11-26 RX ADMIN — SODIUM CHLORIDE, POTASSIUM CHLORIDE, SODIUM LACTATE AND CALCIUM CHLORIDE: 600; 310; 30; 20 INJECTION, SOLUTION INTRAVENOUS at 06:51

## 2019-11-26 RX ADMIN — MEROPENEM 500 MG: 500 INJECTION, POWDER, FOR SOLUTION INTRAVENOUS at 09:30

## 2019-11-26 RX ADMIN — LACTULOSE 20 G: 20 SOLUTION ORAL at 13:57

## 2019-11-26 RX ADMIN — MEROPENEM 500 MG: 500 INJECTION, POWDER, FOR SOLUTION INTRAVENOUS at 04:23

## 2019-11-26 RX ADMIN — SODIUM CHLORIDE: 9 INJECTION, SOLUTION INTRAVENOUS at 11:12

## 2019-11-26 RX ADMIN — LACTULOSE 20 G: 20 SOLUTION ORAL at 09:30

## 2019-11-26 RX ADMIN — MEROPENEM 500 MG: 500 INJECTION, POWDER, FOR SOLUTION INTRAVENOUS at 15:56

## 2019-11-26 RX ADMIN — LACTULOSE 20 G: 20 SOLUTION ORAL at 22:51

## 2019-11-26 RX ADMIN — AMIODARONE HYDROCHLORIDE 200 MG: 200 TABLET ORAL at 09:30

## 2019-11-26 RX ADMIN — ENOXAPARIN SODIUM 40 MG: 40 INJECTION SUBCUTANEOUS at 09:29

## 2019-11-26 RX ADMIN — RIFAXIMIN 550 MG: 550 TABLET ORAL at 09:30

## 2019-11-26 RX ADMIN — MEROPENEM 500 MG: 500 INJECTION, POWDER, FOR SOLUTION INTRAVENOUS at 22:54

## 2019-11-26 RX ADMIN — THIAMINE HYDROCHLORIDE 500 MG: 100 INJECTION, SOLUTION INTRAMUSCULAR; INTRAVENOUS at 03:10

## 2019-11-26 RX ADMIN — NADOLOL 20 MG: 40 TABLET ORAL at 09:30

## 2019-11-26 RX ADMIN — POTASSIUM CHLORIDE 40 MEQ: 750 TABLET, FILM COATED, EXTENDED RELEASE ORAL at 13:57

## 2019-11-26 RX ADMIN — Medication 10 MEQ: at 00:33

## 2019-11-26 RX ADMIN — POTASSIUM CHLORIDE 40 MEQ: 750 TABLET, FILM COATED, EXTENDED RELEASE ORAL at 11:00

## 2019-11-26 ASSESSMENT — PAIN SCALES - WONG BAKER: WONGBAKER_NUMERICALRESPONSE: 0

## 2019-11-27 LAB
A/G RATIO: 0.7 (ref 1.1–2.2)
ALBUMIN SERPL-MCNC: 2.2 G/DL (ref 3.4–5)
ALP BLD-CCNC: 74 U/L (ref 40–129)
ALT SERPL-CCNC: 18 U/L (ref 10–40)
AMMONIA: 54 UMOL/L (ref 16–60)
ANION GAP SERPL CALCULATED.3IONS-SCNC: 9 MMOL/L (ref 3–16)
AST SERPL-CCNC: 43 U/L (ref 15–37)
BILIRUB SERPL-MCNC: 2.3 MG/DL (ref 0–1)
BUN BLDV-MCNC: 9 MG/DL (ref 7–20)
CALCIUM SERPL-MCNC: 8.5 MG/DL (ref 8.3–10.6)
CHLORIDE BLD-SCNC: 112 MMOL/L (ref 99–110)
CO2: 21 MMOL/L (ref 21–32)
CREAT SERPL-MCNC: 0.9 MG/DL (ref 0.8–1.3)
GFR AFRICAN AMERICAN: >60
GFR NON-AFRICAN AMERICAN: >60
GLOBULIN: 3 G/DL
GLUCOSE BLD-MCNC: 84 MG/DL (ref 70–99)
MAGNESIUM: 2 MG/DL (ref 1.8–2.4)
POTASSIUM SERPL-SCNC: 3.2 MMOL/L (ref 3.5–5.1)
SODIUM BLD-SCNC: 142 MMOL/L (ref 136–145)
TOTAL PROTEIN: 5.2 G/DL (ref 6.4–8.2)

## 2019-11-27 PROCEDURE — 36415 COLL VENOUS BLD VENIPUNCTURE: CPT

## 2019-11-27 PROCEDURE — 82140 ASSAY OF AMMONIA: CPT

## 2019-11-27 PROCEDURE — 6360000002 HC RX W HCPCS: Performed by: INTERNAL MEDICINE

## 2019-11-27 PROCEDURE — 2580000003 HC RX 258: Performed by: HOSPITALIST

## 2019-11-27 PROCEDURE — 6370000000 HC RX 637 (ALT 250 FOR IP): Performed by: INTERNAL MEDICINE

## 2019-11-27 PROCEDURE — 80053 COMPREHEN METABOLIC PANEL: CPT

## 2019-11-27 PROCEDURE — 83735 ASSAY OF MAGNESIUM: CPT

## 2019-11-27 PROCEDURE — 94760 N-INVAS EAR/PLS OXIMETRY 1: CPT

## 2019-11-27 PROCEDURE — 2060000000 HC ICU INTERMEDIATE R&B

## 2019-11-27 PROCEDURE — 2580000003 HC RX 258: Performed by: INTERNAL MEDICINE

## 2019-11-27 PROCEDURE — 92526 ORAL FUNCTION THERAPY: CPT

## 2019-11-27 RX ORDER — POTASSIUM CHLORIDE 7.45 MG/ML
10 INJECTION INTRAVENOUS PRN
Status: DISCONTINUED | OUTPATIENT
Start: 2019-11-27 | End: 2019-12-03

## 2019-11-27 RX ORDER — MAGNESIUM SULFATE 1 G/100ML
1 INJECTION INTRAVENOUS PRN
Status: DISCONTINUED | OUTPATIENT
Start: 2019-11-28 | End: 2019-12-04 | Stop reason: HOSPADM

## 2019-11-27 RX ORDER — POTASSIUM CHLORIDE 20 MEQ/1
40 TABLET, EXTENDED RELEASE ORAL PRN
Status: DISCONTINUED | OUTPATIENT
Start: 2019-11-27 | End: 2019-12-03

## 2019-11-27 RX ORDER — MAGNESIUM SULFATE IN WATER 40 MG/ML
2 INJECTION, SOLUTION INTRAVENOUS ONCE
Status: COMPLETED | OUTPATIENT
Start: 2019-11-27 | End: 2019-11-27

## 2019-11-27 RX ORDER — LACTOBACILLUS RHAMNOSUS GG 10B CELL
1 CAPSULE ORAL 2 TIMES DAILY WITH MEALS
Status: DISCONTINUED | OUTPATIENT
Start: 2019-11-27 | End: 2019-12-04 | Stop reason: HOSPADM

## 2019-11-27 RX ADMIN — NADOLOL 20 MG: 40 TABLET ORAL at 09:57

## 2019-11-27 RX ADMIN — AMIODARONE HYDROCHLORIDE 200 MG: 200 TABLET ORAL at 09:57

## 2019-11-27 RX ADMIN — SODIUM CHLORIDE, PRESERVATIVE FREE 10 ML: 5 INJECTION INTRAVENOUS at 09:58

## 2019-11-27 RX ADMIN — MEROPENEM 500 MG: 500 INJECTION, POWDER, FOR SOLUTION INTRAVENOUS at 09:57

## 2019-11-27 RX ADMIN — POTASSIUM CHLORIDE 40 MEQ: 1500 TABLET, EXTENDED RELEASE ORAL at 16:14

## 2019-11-27 RX ADMIN — MEROPENEM 500 MG: 500 INJECTION, POWDER, FOR SOLUTION INTRAVENOUS at 16:14

## 2019-11-27 RX ADMIN — MAGNESIUM SULFATE HEPTAHYDRATE 2 G: 40 INJECTION, SOLUTION INTRAVENOUS at 09:57

## 2019-11-27 RX ADMIN — RIFAXIMIN 550 MG: 550 TABLET ORAL at 21:35

## 2019-11-27 RX ADMIN — LACTULOSE 20 G: 20 SOLUTION ORAL at 09:57

## 2019-11-27 RX ADMIN — MEROPENEM 500 MG: 500 INJECTION, POWDER, FOR SOLUTION INTRAVENOUS at 21:35

## 2019-11-27 RX ADMIN — MEROPENEM 500 MG: 500 INJECTION, POWDER, FOR SOLUTION INTRAVENOUS at 03:53

## 2019-11-27 RX ADMIN — Medication 1 CAPSULE: at 09:57

## 2019-11-27 RX ADMIN — LACTULOSE 20 G: 20 SOLUTION ORAL at 21:35

## 2019-11-27 RX ADMIN — SODIUM CHLORIDE: 9 INJECTION, SOLUTION INTRAVENOUS at 07:39

## 2019-11-27 RX ADMIN — Medication 1 CAPSULE: at 16:14

## 2019-11-27 RX ADMIN — TAMSULOSIN HYDROCHLORIDE 0.4 MG: 0.4 CAPSULE ORAL at 21:35

## 2019-11-27 RX ADMIN — DULOXETINE HYDROCHLORIDE 30 MG: 30 CAPSULE, DELAYED RELEASE ORAL at 09:57

## 2019-11-27 RX ADMIN — LACTULOSE 20 G: 20 SOLUTION ORAL at 16:14

## 2019-11-27 RX ADMIN — RIFAXIMIN 550 MG: 550 TABLET ORAL at 09:57

## 2019-11-27 RX ADMIN — FOLIC ACID 1 MG: 1 TABLET ORAL at 09:57

## 2019-11-28 LAB
ANION GAP SERPL CALCULATED.3IONS-SCNC: 10 MMOL/L (ref 3–16)
BUN BLDV-MCNC: 9 MG/DL (ref 7–20)
CALCIUM SERPL-MCNC: 8.6 MG/DL (ref 8.3–10.6)
CHLORIDE BLD-SCNC: 116 MMOL/L (ref 99–110)
CO2: 19 MMOL/L (ref 21–32)
CREAT SERPL-MCNC: 0.9 MG/DL (ref 0.8–1.3)
GFR AFRICAN AMERICAN: >60
GFR NON-AFRICAN AMERICAN: >60
GLUCOSE BLD-MCNC: 120 MG/DL (ref 70–99)
MAGNESIUM: 1.9 MG/DL (ref 1.8–2.4)
POTASSIUM SERPL-SCNC: 3.6 MMOL/L (ref 3.5–5.1)
SODIUM BLD-SCNC: 145 MMOL/L (ref 136–145)

## 2019-11-28 PROCEDURE — 6360000002 HC RX W HCPCS: Performed by: INTERNAL MEDICINE

## 2019-11-28 PROCEDURE — 2060000000 HC ICU INTERMEDIATE R&B

## 2019-11-28 PROCEDURE — 6370000000 HC RX 637 (ALT 250 FOR IP): Performed by: INTERNAL MEDICINE

## 2019-11-28 PROCEDURE — 83735 ASSAY OF MAGNESIUM: CPT

## 2019-11-28 PROCEDURE — 2580000003 HC RX 258: Performed by: HOSPITALIST

## 2019-11-28 PROCEDURE — 80048 BASIC METABOLIC PNL TOTAL CA: CPT

## 2019-11-28 PROCEDURE — 2580000003 HC RX 258: Performed by: INTERNAL MEDICINE

## 2019-11-28 PROCEDURE — 36415 COLL VENOUS BLD VENIPUNCTURE: CPT

## 2019-11-28 RX ADMIN — MEROPENEM 500 MG: 500 INJECTION, POWDER, FOR SOLUTION INTRAVENOUS at 11:36

## 2019-11-28 RX ADMIN — MEROPENEM 500 MG: 500 INJECTION, POWDER, FOR SOLUTION INTRAVENOUS at 22:03

## 2019-11-28 RX ADMIN — Medication 1 CAPSULE: at 17:28

## 2019-11-28 RX ADMIN — Medication 1 CAPSULE: at 09:49

## 2019-11-28 RX ADMIN — LACTULOSE 20 G: 20 SOLUTION ORAL at 22:07

## 2019-11-28 RX ADMIN — RIFAXIMIN 550 MG: 550 TABLET ORAL at 09:49

## 2019-11-28 RX ADMIN — MEROPENEM 500 MG: 500 INJECTION, POWDER, FOR SOLUTION INTRAVENOUS at 17:29

## 2019-11-28 RX ADMIN — FOLIC ACID 1 MG: 1 TABLET ORAL at 09:49

## 2019-11-28 RX ADMIN — LACTULOSE 20 G: 20 SOLUTION ORAL at 14:14

## 2019-11-28 RX ADMIN — RIFAXIMIN 550 MG: 550 TABLET ORAL at 22:07

## 2019-11-28 RX ADMIN — AMIODARONE HYDROCHLORIDE 200 MG: 200 TABLET ORAL at 09:49

## 2019-11-28 RX ADMIN — TAMSULOSIN HYDROCHLORIDE 0.4 MG: 0.4 CAPSULE ORAL at 22:07

## 2019-11-28 RX ADMIN — SODIUM CHLORIDE: 9 INJECTION, SOLUTION INTRAVENOUS at 04:09

## 2019-11-28 RX ADMIN — MEROPENEM 500 MG: 500 INJECTION, POWDER, FOR SOLUTION INTRAVENOUS at 04:07

## 2019-11-28 RX ADMIN — DULOXETINE HYDROCHLORIDE 30 MG: 30 CAPSULE, DELAYED RELEASE ORAL at 09:49

## 2019-11-28 RX ADMIN — NADOLOL 20 MG: 40 TABLET ORAL at 09:49

## 2019-11-28 ASSESSMENT — PAIN SCALES - GENERAL
PAINLEVEL_OUTOF10: 0

## 2019-11-28 ASSESSMENT — PAIN SCALES - WONG BAKER
WONGBAKER_NUMERICALRESPONSE: 0
WONGBAKER_NUMERICALRESPONSE: 0

## 2019-11-29 LAB
ANION GAP SERPL CALCULATED.3IONS-SCNC: 9 MMOL/L (ref 3–16)
BUN BLDV-MCNC: 9 MG/DL (ref 7–20)
CALCIUM SERPL-MCNC: 8.6 MG/DL (ref 8.3–10.6)
CHLORIDE BLD-SCNC: 113 MMOL/L (ref 99–110)
CO2: 21 MMOL/L (ref 21–32)
CREAT SERPL-MCNC: 0.8 MG/DL (ref 0.8–1.3)
GFR AFRICAN AMERICAN: >60
GFR NON-AFRICAN AMERICAN: >60
GLUCOSE BLD-MCNC: 96 MG/DL (ref 70–99)
HCT VFR BLD CALC: 29.9 % (ref 40.5–52.5)
HEMOGLOBIN: 10.1 G/DL (ref 13.5–17.5)
MAGNESIUM: 1.7 MG/DL (ref 1.8–2.4)
MCH RBC QN AUTO: 34.8 PG (ref 26–34)
MCHC RBC AUTO-ENTMCNC: 33.8 G/DL (ref 31–36)
MCV RBC AUTO: 102.7 FL (ref 80–100)
ORGANISM: ABNORMAL
ORGANISM: ABNORMAL
PDW BLD-RTO: 22.5 % (ref 12.4–15.4)
PLATELET # BLD: 53 K/UL (ref 135–450)
PMV BLD AUTO: 9.2 FL (ref 5–10.5)
POTASSIUM SERPL-SCNC: 3 MMOL/L (ref 3.5–5.1)
POTASSIUM SERPL-SCNC: 3.4 MMOL/L (ref 3.5–5.1)
RBC # BLD: 2.91 M/UL (ref 4.2–5.9)
SODIUM BLD-SCNC: 143 MMOL/L (ref 136–145)
URINE CULTURE, ROUTINE: ABNORMAL
URINE CULTURE, ROUTINE: ABNORMAL
WBC # BLD: 3 K/UL (ref 4–11)

## 2019-11-29 PROCEDURE — 36415 COLL VENOUS BLD VENIPUNCTURE: CPT

## 2019-11-29 PROCEDURE — 6370000000 HC RX 637 (ALT 250 FOR IP): Performed by: INTERNAL MEDICINE

## 2019-11-29 PROCEDURE — 80048 BASIC METABOLIC PNL TOTAL CA: CPT

## 2019-11-29 PROCEDURE — 2580000003 HC RX 258: Performed by: HOSPITALIST

## 2019-11-29 PROCEDURE — 2580000003 HC RX 258: Performed by: INTERNAL MEDICINE

## 2019-11-29 PROCEDURE — 6360000002 HC RX W HCPCS: Performed by: INTERNAL MEDICINE

## 2019-11-29 PROCEDURE — 94760 N-INVAS EAR/PLS OXIMETRY 1: CPT

## 2019-11-29 PROCEDURE — 2060000000 HC ICU INTERMEDIATE R&B

## 2019-11-29 PROCEDURE — 84132 ASSAY OF SERUM POTASSIUM: CPT

## 2019-11-29 PROCEDURE — 97530 THERAPEUTIC ACTIVITIES: CPT | Performed by: PHYSICAL THERAPIST

## 2019-11-29 PROCEDURE — 85027 COMPLETE CBC AUTOMATED: CPT

## 2019-11-29 PROCEDURE — 97166 OT EVAL MOD COMPLEX 45 MIN: CPT

## 2019-11-29 PROCEDURE — 83735 ASSAY OF MAGNESIUM: CPT

## 2019-11-29 PROCEDURE — 97535 SELF CARE MNGMENT TRAINING: CPT

## 2019-11-29 PROCEDURE — 97530 THERAPEUTIC ACTIVITIES: CPT

## 2019-11-29 PROCEDURE — 97162 PT EVAL MOD COMPLEX 30 MIN: CPT | Performed by: PHYSICAL THERAPIST

## 2019-11-29 RX ADMIN — LACTULOSE 20 G: 20 SOLUTION ORAL at 13:05

## 2019-11-29 RX ADMIN — SODIUM CHLORIDE, PRESERVATIVE FREE 10 ML: 5 INJECTION INTRAVENOUS at 08:51

## 2019-11-29 RX ADMIN — Medication 1 CAPSULE: at 08:39

## 2019-11-29 RX ADMIN — Medication 1 CAPSULE: at 17:16

## 2019-11-29 RX ADMIN — LACTULOSE 20 G: 20 SOLUTION ORAL at 08:50

## 2019-11-29 RX ADMIN — POTASSIUM CHLORIDE 10 MEQ: 7.46 INJECTION, SOLUTION INTRAVENOUS at 12:52

## 2019-11-29 RX ADMIN — DULOXETINE HYDROCHLORIDE 30 MG: 30 CAPSULE, DELAYED RELEASE ORAL at 08:40

## 2019-11-29 RX ADMIN — MEROPENEM 500 MG: 500 INJECTION, POWDER, FOR SOLUTION INTRAVENOUS at 08:40

## 2019-11-29 RX ADMIN — PIPERACILLIN AND TAZOBACTAM 3.38 G: 3; .375 INJECTION, POWDER, LYOPHILIZED, FOR SOLUTION INTRAVENOUS at 21:32

## 2019-11-29 RX ADMIN — NADOLOL 20 MG: 40 TABLET ORAL at 08:39

## 2019-11-29 RX ADMIN — AMIODARONE HYDROCHLORIDE 200 MG: 200 TABLET ORAL at 08:40

## 2019-11-29 RX ADMIN — MEROPENEM 500 MG: 500 INJECTION, POWDER, FOR SOLUTION INTRAVENOUS at 04:07

## 2019-11-29 RX ADMIN — LACTULOSE 20 G: 20 SOLUTION ORAL at 21:16

## 2019-11-29 RX ADMIN — FOLIC ACID 1 MG: 1 TABLET ORAL at 08:40

## 2019-11-29 RX ADMIN — PIPERACILLIN AND TAZOBACTAM 3.38 G: 3; .375 INJECTION, POWDER, LYOPHILIZED, FOR SOLUTION INTRAVENOUS at 13:05

## 2019-11-29 RX ADMIN — POTASSIUM CHLORIDE 10 MEQ: 7.46 INJECTION, SOLUTION INTRAVENOUS at 11:44

## 2019-11-29 RX ADMIN — POTASSIUM CHLORIDE 10 MEQ: 7.46 INJECTION, SOLUTION INTRAVENOUS at 07:16

## 2019-11-29 RX ADMIN — RIFAXIMIN 550 MG: 550 TABLET ORAL at 21:16

## 2019-11-29 RX ADMIN — SODIUM CHLORIDE 50 ML/HR: 9 INJECTION, SOLUTION INTRAVENOUS at 00:49

## 2019-11-29 RX ADMIN — POTASSIUM CHLORIDE 10 MEQ: 7.46 INJECTION, SOLUTION INTRAVENOUS at 10:26

## 2019-11-29 RX ADMIN — TAMSULOSIN HYDROCHLORIDE 0.4 MG: 0.4 CAPSULE ORAL at 21:16

## 2019-11-29 RX ADMIN — RIFAXIMIN 550 MG: 550 TABLET ORAL at 08:40

## 2019-11-29 RX ADMIN — POTASSIUM CHLORIDE 10 MEQ: 7.46 INJECTION, SOLUTION INTRAVENOUS at 14:04

## 2019-11-29 RX ADMIN — POTASSIUM CHLORIDE 10 MEQ: 7.46 INJECTION, SOLUTION INTRAVENOUS at 08:40

## 2019-11-29 ASSESSMENT — PAIN SCALES - GENERAL
PAINLEVEL_OUTOF10: 0

## 2019-11-29 ASSESSMENT — PAIN SCALES - WONG BAKER
WONGBAKER_NUMERICALRESPONSE: 0

## 2019-11-30 ENCOUNTER — APPOINTMENT (OUTPATIENT)
Dept: CT IMAGING | Age: 69
DRG: 698 | End: 2019-11-30
Payer: MEDICARE

## 2019-11-30 ENCOUNTER — APPOINTMENT (OUTPATIENT)
Dept: GENERAL RADIOLOGY | Age: 69
DRG: 698 | End: 2019-11-30
Payer: MEDICARE

## 2019-11-30 LAB
ANION GAP SERPL CALCULATED.3IONS-SCNC: 10 MMOL/L (ref 3–16)
BUN BLDV-MCNC: 9 MG/DL (ref 7–20)
CALCIUM SERPL-MCNC: 8.4 MG/DL (ref 8.3–10.6)
CHLORIDE BLD-SCNC: 108 MMOL/L (ref 99–110)
CO2: 20 MMOL/L (ref 21–32)
CREAT SERPL-MCNC: 0.8 MG/DL (ref 0.8–1.3)
GFR AFRICAN AMERICAN: >60
GFR NON-AFRICAN AMERICAN: >60
GLUCOSE BLD-MCNC: 110 MG/DL (ref 70–99)
HCT VFR BLD CALC: 30.5 % (ref 40.5–52.5)
HEMOGLOBIN: 10.1 G/DL (ref 13.5–17.5)
MAGNESIUM: 1.7 MG/DL (ref 1.8–2.4)
MCH RBC QN AUTO: 34.2 PG (ref 26–34)
MCHC RBC AUTO-ENTMCNC: 32.9 G/DL (ref 31–36)
MCV RBC AUTO: 103.9 FL (ref 80–100)
PDW BLD-RTO: 22.8 % (ref 12.4–15.4)
PLATELET # BLD: 51 K/UL (ref 135–450)
PMV BLD AUTO: 9 FL (ref 5–10.5)
POTASSIUM SERPL-SCNC: 3.3 MMOL/L (ref 3.5–5.1)
POTASSIUM SERPL-SCNC: 3.4 MMOL/L (ref 3.5–5.1)
RBC # BLD: 2.94 M/UL (ref 4.2–5.9)
SODIUM BLD-SCNC: 138 MMOL/L (ref 136–145)
WBC # BLD: 3.1 K/UL (ref 4–11)

## 2019-11-30 PROCEDURE — 74018 RADEX ABDOMEN 1 VIEW: CPT

## 2019-11-30 PROCEDURE — 6360000002 HC RX W HCPCS: Performed by: INTERNAL MEDICINE

## 2019-11-30 PROCEDURE — 6370000000 HC RX 637 (ALT 250 FOR IP): Performed by: INTERNAL MEDICINE

## 2019-11-30 PROCEDURE — 87040 BLOOD CULTURE FOR BACTERIA: CPT

## 2019-11-30 PROCEDURE — 83735 ASSAY OF MAGNESIUM: CPT

## 2019-11-30 PROCEDURE — 74176 CT ABD & PELVIS W/O CONTRAST: CPT

## 2019-11-30 PROCEDURE — 6360000004 HC RX CONTRAST MEDICATION

## 2019-11-30 PROCEDURE — 2580000003 HC RX 258: Performed by: INTERNAL MEDICINE

## 2019-11-30 PROCEDURE — 84132 ASSAY OF SERUM POTASSIUM: CPT

## 2019-11-30 PROCEDURE — 80048 BASIC METABOLIC PNL TOTAL CA: CPT

## 2019-11-30 PROCEDURE — 85027 COMPLETE CBC AUTOMATED: CPT

## 2019-11-30 PROCEDURE — 36415 COLL VENOUS BLD VENIPUNCTURE: CPT

## 2019-11-30 PROCEDURE — 94760 N-INVAS EAR/PLS OXIMETRY 1: CPT

## 2019-11-30 PROCEDURE — 2060000000 HC ICU INTERMEDIATE R&B

## 2019-11-30 RX ORDER — 0.9 % SODIUM CHLORIDE 0.9 %
500 INTRAVENOUS SOLUTION INTRAVENOUS ONCE
Status: COMPLETED | OUTPATIENT
Start: 2019-11-30 | End: 2019-11-30

## 2019-11-30 RX ADMIN — RIFAXIMIN 550 MG: 550 TABLET ORAL at 21:38

## 2019-11-30 RX ADMIN — Medication 1 CAPSULE: at 18:05

## 2019-11-30 RX ADMIN — PIPERACILLIN AND TAZOBACTAM 3.38 G: 3; .375 INJECTION, POWDER, LYOPHILIZED, FOR SOLUTION INTRAVENOUS at 15:16

## 2019-11-30 RX ADMIN — SODIUM CHLORIDE 500 ML: 9 INJECTION, SOLUTION INTRAVENOUS at 11:51

## 2019-11-30 RX ADMIN — DULOXETINE HYDROCHLORIDE 30 MG: 30 CAPSULE, DELAYED RELEASE ORAL at 08:50

## 2019-11-30 RX ADMIN — PIPERACILLIN AND TAZOBACTAM 3.38 G: 3; .375 INJECTION, POWDER, LYOPHILIZED, FOR SOLUTION INTRAVENOUS at 05:32

## 2019-11-30 RX ADMIN — AMIODARONE HYDROCHLORIDE 200 MG: 200 TABLET ORAL at 08:50

## 2019-11-30 RX ADMIN — LACTULOSE 20 G: 20 SOLUTION ORAL at 15:16

## 2019-11-30 RX ADMIN — Medication 1 CAPSULE: at 08:50

## 2019-11-30 RX ADMIN — TAMSULOSIN HYDROCHLORIDE 0.4 MG: 0.4 CAPSULE ORAL at 21:38

## 2019-11-30 RX ADMIN — POTASSIUM BICARBONATE 40 MEQ: 782 TABLET, EFFERVESCENT ORAL at 06:43

## 2019-11-30 RX ADMIN — LACTULOSE 20 G: 20 SOLUTION ORAL at 21:38

## 2019-11-30 RX ADMIN — LACTULOSE 20 G: 20 SOLUTION ORAL at 08:50

## 2019-11-30 RX ADMIN — FOLIC ACID 1 MG: 1 TABLET ORAL at 08:50

## 2019-11-30 RX ADMIN — IOHEXOL 50 ML: 240 INJECTION, SOLUTION INTRATHECAL; INTRAVASCULAR; INTRAVENOUS; ORAL at 17:16

## 2019-11-30 RX ADMIN — SODIUM CHLORIDE, PRESERVATIVE FREE 10 ML: 5 INJECTION INTRAVENOUS at 08:50

## 2019-11-30 RX ADMIN — PIPERACILLIN AND TAZOBACTAM 3.38 G: 3; .375 INJECTION, POWDER, LYOPHILIZED, FOR SOLUTION INTRAVENOUS at 21:38

## 2019-11-30 RX ADMIN — NADOLOL 20 MG: 40 TABLET ORAL at 08:50

## 2019-11-30 RX ADMIN — RIFAXIMIN 550 MG: 550 TABLET ORAL at 08:50

## 2019-11-30 RX ADMIN — POTASSIUM BICARBONATE 40 MEQ: 782 TABLET, EFFERVESCENT ORAL at 01:11

## 2019-11-30 ASSESSMENT — PAIN SCALES - GENERAL
PAINLEVEL_OUTOF10: 0

## 2019-12-01 LAB
ANION GAP SERPL CALCULATED.3IONS-SCNC: 11 MMOL/L (ref 3–16)
BUN BLDV-MCNC: 7 MG/DL (ref 7–20)
CALCIUM SERPL-MCNC: 8.4 MG/DL (ref 8.3–10.6)
CHLORIDE BLD-SCNC: 107 MMOL/L (ref 99–110)
CO2: 20 MMOL/L (ref 21–32)
CREAT SERPL-MCNC: 0.9 MG/DL (ref 0.8–1.3)
GFR AFRICAN AMERICAN: >60
GFR NON-AFRICAN AMERICAN: >60
GLUCOSE BLD-MCNC: 106 MG/DL (ref 70–99)
HCT VFR BLD CALC: 28.3 % (ref 40.5–52.5)
HEMOGLOBIN: 9.5 G/DL (ref 13.5–17.5)
MAGNESIUM: 1.7 MG/DL (ref 1.8–2.4)
MCH RBC QN AUTO: 34.4 PG (ref 26–34)
MCHC RBC AUTO-ENTMCNC: 33.6 G/DL (ref 31–36)
MCV RBC AUTO: 102.5 FL (ref 80–100)
PDW BLD-RTO: 22.6 % (ref 12.4–15.4)
PLATELET # BLD: 47 K/UL (ref 135–450)
PMV BLD AUTO: 9 FL (ref 5–10.5)
POTASSIUM SERPL-SCNC: 2.9 MMOL/L (ref 3.5–5.1)
POTASSIUM SERPL-SCNC: 3.6 MMOL/L (ref 3.5–5.1)
RBC # BLD: 2.76 M/UL (ref 4.2–5.9)
SODIUM BLD-SCNC: 138 MMOL/L (ref 136–145)
WBC # BLD: 3.4 K/UL (ref 4–11)

## 2019-12-01 PROCEDURE — 6360000002 HC RX W HCPCS: Performed by: INTERNAL MEDICINE

## 2019-12-01 PROCEDURE — 85027 COMPLETE CBC AUTOMATED: CPT

## 2019-12-01 PROCEDURE — 2060000000 HC ICU INTERMEDIATE R&B

## 2019-12-01 PROCEDURE — 83735 ASSAY OF MAGNESIUM: CPT

## 2019-12-01 PROCEDURE — 6370000000 HC RX 637 (ALT 250 FOR IP): Performed by: INTERNAL MEDICINE

## 2019-12-01 PROCEDURE — 84132 ASSAY OF SERUM POTASSIUM: CPT

## 2019-12-01 PROCEDURE — 36415 COLL VENOUS BLD VENIPUNCTURE: CPT

## 2019-12-01 PROCEDURE — 80048 BASIC METABOLIC PNL TOTAL CA: CPT

## 2019-12-01 PROCEDURE — 2580000003 HC RX 258: Performed by: INTERNAL MEDICINE

## 2019-12-01 RX ORDER — MAGNESIUM SULFATE IN WATER 40 MG/ML
2 INJECTION, SOLUTION INTRAVENOUS ONCE
Status: COMPLETED | OUTPATIENT
Start: 2019-12-01 | End: 2019-12-01

## 2019-12-01 RX ADMIN — PIPERACILLIN AND TAZOBACTAM 3.38 G: 3; .375 INJECTION, POWDER, LYOPHILIZED, FOR SOLUTION INTRAVENOUS at 21:48

## 2019-12-01 RX ADMIN — POTASSIUM CHLORIDE 10 MEQ: 7.46 INJECTION, SOLUTION INTRAVENOUS at 10:46

## 2019-12-01 RX ADMIN — DULOXETINE HYDROCHLORIDE 30 MG: 30 CAPSULE, DELAYED RELEASE ORAL at 09:03

## 2019-12-01 RX ADMIN — FOLIC ACID 1 MG: 1 TABLET ORAL at 09:02

## 2019-12-01 RX ADMIN — POTASSIUM CHLORIDE 10 MEQ: 7.46 INJECTION, SOLUTION INTRAVENOUS at 15:18

## 2019-12-01 RX ADMIN — POTASSIUM CHLORIDE 10 MEQ: 7.46 INJECTION, SOLUTION INTRAVENOUS at 11:49

## 2019-12-01 RX ADMIN — MAGNESIUM SULFATE HEPTAHYDRATE 2 G: 40 INJECTION, SOLUTION INTRAVENOUS at 10:32

## 2019-12-01 RX ADMIN — POTASSIUM CHLORIDE 10 MEQ: 7.46 INJECTION, SOLUTION INTRAVENOUS at 16:09

## 2019-12-01 RX ADMIN — LACTULOSE 20 G: 20 SOLUTION ORAL at 21:47

## 2019-12-01 RX ADMIN — Medication 1 CAPSULE: at 17:58

## 2019-12-01 RX ADMIN — TAMSULOSIN HYDROCHLORIDE 0.4 MG: 0.4 CAPSULE ORAL at 21:48

## 2019-12-01 RX ADMIN — POTASSIUM CHLORIDE 10 MEQ: 7.46 INJECTION, SOLUTION INTRAVENOUS at 13:50

## 2019-12-01 RX ADMIN — LACTULOSE 20 G: 20 SOLUTION ORAL at 13:50

## 2019-12-01 RX ADMIN — Medication 1 CAPSULE: at 09:02

## 2019-12-01 RX ADMIN — PIPERACILLIN AND TAZOBACTAM 3.38 G: 3; .375 INJECTION, POWDER, LYOPHILIZED, FOR SOLUTION INTRAVENOUS at 13:50

## 2019-12-01 RX ADMIN — AMIODARONE HYDROCHLORIDE 200 MG: 200 TABLET ORAL at 09:02

## 2019-12-01 RX ADMIN — POTASSIUM CHLORIDE 10 MEQ: 7.46 INJECTION, SOLUTION INTRAVENOUS at 12:42

## 2019-12-01 RX ADMIN — RIFAXIMIN 550 MG: 550 TABLET ORAL at 21:48

## 2019-12-01 RX ADMIN — LACTULOSE 20 G: 20 SOLUTION ORAL at 09:02

## 2019-12-01 RX ADMIN — PIPERACILLIN AND TAZOBACTAM 3.38 G: 3; .375 INJECTION, POWDER, LYOPHILIZED, FOR SOLUTION INTRAVENOUS at 05:35

## 2019-12-01 RX ADMIN — SODIUM CHLORIDE, PRESERVATIVE FREE 10 ML: 5 INJECTION INTRAVENOUS at 09:03

## 2019-12-01 RX ADMIN — RIFAXIMIN 550 MG: 550 TABLET ORAL at 09:02

## 2019-12-01 ASSESSMENT — PAIN SCALES - GENERAL
PAINLEVEL_OUTOF10: 0

## 2019-12-02 LAB
ANION GAP SERPL CALCULATED.3IONS-SCNC: 12 MMOL/L (ref 3–16)
BUN BLDV-MCNC: 7 MG/DL (ref 7–20)
CALCIUM SERPL-MCNC: 8.2 MG/DL (ref 8.3–10.6)
CHLORIDE BLD-SCNC: 107 MMOL/L (ref 99–110)
CO2: 19 MMOL/L (ref 21–32)
CREAT SERPL-MCNC: 0.9 MG/DL (ref 0.8–1.3)
GFR AFRICAN AMERICAN: >60
GFR NON-AFRICAN AMERICAN: >60
GLUCOSE BLD-MCNC: 101 MG/DL (ref 70–99)
HCT VFR BLD CALC: 28.1 % (ref 40.5–52.5)
HEMOGLOBIN: 9.6 G/DL (ref 13.5–17.5)
MAGNESIUM: 1.8 MG/DL (ref 1.8–2.4)
MCH RBC QN AUTO: 34.9 PG (ref 26–34)
MCHC RBC AUTO-ENTMCNC: 34.3 G/DL (ref 31–36)
MCV RBC AUTO: 101.7 FL (ref 80–100)
PDW BLD-RTO: 22.5 % (ref 12.4–15.4)
PLATELET # BLD: 48 K/UL (ref 135–450)
PMV BLD AUTO: 8.9 FL (ref 5–10.5)
POTASSIUM SERPL-SCNC: 3 MMOL/L (ref 3.5–5.1)
POTASSIUM SERPL-SCNC: 3.3 MMOL/L (ref 3.5–5.1)
RBC # BLD: 2.77 M/UL (ref 4.2–5.9)
SODIUM BLD-SCNC: 138 MMOL/L (ref 136–145)
WBC # BLD: 4.1 K/UL (ref 4–11)

## 2019-12-02 PROCEDURE — 2580000003 HC RX 258: Performed by: INTERNAL MEDICINE

## 2019-12-02 PROCEDURE — 80048 BASIC METABOLIC PNL TOTAL CA: CPT

## 2019-12-02 PROCEDURE — 2580000003 HC RX 258: Performed by: HOSPITALIST

## 2019-12-02 PROCEDURE — 36415 COLL VENOUS BLD VENIPUNCTURE: CPT

## 2019-12-02 PROCEDURE — 84132 ASSAY OF SERUM POTASSIUM: CPT

## 2019-12-02 PROCEDURE — 6360000002 HC RX W HCPCS: Performed by: INTERNAL MEDICINE

## 2019-12-02 PROCEDURE — 6370000000 HC RX 637 (ALT 250 FOR IP): Performed by: INTERNAL MEDICINE

## 2019-12-02 PROCEDURE — 83735 ASSAY OF MAGNESIUM: CPT

## 2019-12-02 PROCEDURE — 2060000000 HC ICU INTERMEDIATE R&B

## 2019-12-02 PROCEDURE — 85027 COMPLETE CBC AUTOMATED: CPT

## 2019-12-02 PROCEDURE — 94760 N-INVAS EAR/PLS OXIMETRY 1: CPT

## 2019-12-02 RX ORDER — MAGNESIUM SULFATE IN WATER 40 MG/ML
2 INJECTION, SOLUTION INTRAVENOUS ONCE
Status: COMPLETED | OUTPATIENT
Start: 2019-12-02 | End: 2019-12-02

## 2019-12-02 RX ADMIN — Medication 1 CAPSULE: at 08:33

## 2019-12-02 RX ADMIN — POTASSIUM CHLORIDE 10 MEQ: 7.46 INJECTION, SOLUTION INTRAVENOUS at 15:11

## 2019-12-02 RX ADMIN — LACTULOSE 20 G: 20 SOLUTION ORAL at 21:46

## 2019-12-02 RX ADMIN — MAGNESIUM SULFATE HEPTAHYDRATE 2 G: 40 INJECTION, SOLUTION INTRAVENOUS at 13:05

## 2019-12-02 RX ADMIN — Medication 1 CAPSULE: at 18:31

## 2019-12-02 RX ADMIN — AMIODARONE HYDROCHLORIDE 200 MG: 200 TABLET ORAL at 08:33

## 2019-12-02 RX ADMIN — PIPERACILLIN AND TAZOBACTAM 3.38 G: 3; .375 INJECTION, POWDER, LYOPHILIZED, FOR SOLUTION INTRAVENOUS at 14:16

## 2019-12-02 RX ADMIN — PIPERACILLIN AND TAZOBACTAM 3.38 G: 3; .375 INJECTION, POWDER, LYOPHILIZED, FOR SOLUTION INTRAVENOUS at 21:46

## 2019-12-02 RX ADMIN — POTASSIUM CHLORIDE 10 MEQ: 7.46 INJECTION, SOLUTION INTRAVENOUS at 10:15

## 2019-12-02 RX ADMIN — POTASSIUM CHLORIDE 10 MEQ: 7.46 INJECTION, SOLUTION INTRAVENOUS at 12:42

## 2019-12-02 RX ADMIN — SODIUM CHLORIDE: 9 INJECTION, SOLUTION INTRAVENOUS at 06:10

## 2019-12-02 RX ADMIN — PIPERACILLIN AND TAZOBACTAM 3.38 G: 3; .375 INJECTION, POWDER, LYOPHILIZED, FOR SOLUTION INTRAVENOUS at 06:10

## 2019-12-02 RX ADMIN — SODIUM CHLORIDE, PRESERVATIVE FREE 10 ML: 5 INJECTION INTRAVENOUS at 08:33

## 2019-12-02 RX ADMIN — RIFAXIMIN 550 MG: 550 TABLET ORAL at 08:33

## 2019-12-02 RX ADMIN — LACTULOSE 20 G: 20 SOLUTION ORAL at 14:16

## 2019-12-02 RX ADMIN — FOLIC ACID 1 MG: 1 TABLET ORAL at 08:33

## 2019-12-02 RX ADMIN — LACTULOSE 20 G: 20 SOLUTION ORAL at 08:33

## 2019-12-02 RX ADMIN — POTASSIUM CHLORIDE 10 MEQ: 7.46 INJECTION, SOLUTION INTRAVENOUS at 08:44

## 2019-12-02 RX ADMIN — POTASSIUM CHLORIDE 10 MEQ: 7.46 INJECTION, SOLUTION INTRAVENOUS at 11:33

## 2019-12-02 RX ADMIN — TAMSULOSIN HYDROCHLORIDE 0.4 MG: 0.4 CAPSULE ORAL at 21:45

## 2019-12-02 RX ADMIN — RIFAXIMIN 550 MG: 550 TABLET ORAL at 21:45

## 2019-12-02 RX ADMIN — POTASSIUM CHLORIDE 10 MEQ: 7.46 INJECTION, SOLUTION INTRAVENOUS at 14:11

## 2019-12-02 RX ADMIN — SODIUM CHLORIDE, PRESERVATIVE FREE 10 ML: 5 INJECTION INTRAVENOUS at 21:45

## 2019-12-02 RX ADMIN — DULOXETINE HYDROCHLORIDE 30 MG: 30 CAPSULE, DELAYED RELEASE ORAL at 08:33

## 2019-12-02 ASSESSMENT — PAIN SCALES - GENERAL
PAINLEVEL_OUTOF10: 0

## 2019-12-03 LAB
ANION GAP SERPL CALCULATED.3IONS-SCNC: 9 MMOL/L (ref 3–16)
BUN BLDV-MCNC: 7 MG/DL (ref 7–20)
CALCIUM SERPL-MCNC: 8.4 MG/DL (ref 8.3–10.6)
CHLORIDE BLD-SCNC: 108 MMOL/L (ref 99–110)
CO2: 20 MMOL/L (ref 21–32)
CREAT SERPL-MCNC: 0.8 MG/DL (ref 0.8–1.3)
GFR AFRICAN AMERICAN: >60
GFR NON-AFRICAN AMERICAN: >60
GLUCOSE BLD-MCNC: 117 MG/DL (ref 70–99)
HCT VFR BLD CALC: 30.1 % (ref 40.5–52.5)
HEMOGLOBIN: 10.1 G/DL (ref 13.5–17.5)
MAGNESIUM: 1.9 MG/DL (ref 1.8–2.4)
MCH RBC QN AUTO: 34.4 PG (ref 26–34)
MCHC RBC AUTO-ENTMCNC: 33.6 G/DL (ref 31–36)
MCV RBC AUTO: 102.5 FL (ref 80–100)
PDW BLD-RTO: 22.5 % (ref 12.4–15.4)
PLATELET # BLD: 60 K/UL (ref 135–450)
PMV BLD AUTO: 9.6 FL (ref 5–10.5)
POTASSIUM SERPL-SCNC: 3.3 MMOL/L (ref 3.5–5.1)
RBC # BLD: 2.93 M/UL (ref 4.2–5.9)
SODIUM BLD-SCNC: 137 MMOL/L (ref 136–145)
WBC # BLD: 4.7 K/UL (ref 4–11)

## 2019-12-03 PROCEDURE — 2060000000 HC ICU INTERMEDIATE R&B

## 2019-12-03 PROCEDURE — 6370000000 HC RX 637 (ALT 250 FOR IP): Performed by: INTERNAL MEDICINE

## 2019-12-03 PROCEDURE — 94760 N-INVAS EAR/PLS OXIMETRY 1: CPT

## 2019-12-03 PROCEDURE — 97530 THERAPEUTIC ACTIVITIES: CPT | Performed by: PHYSICAL THERAPIST

## 2019-12-03 PROCEDURE — 83735 ASSAY OF MAGNESIUM: CPT

## 2019-12-03 PROCEDURE — 97530 THERAPEUTIC ACTIVITIES: CPT

## 2019-12-03 PROCEDURE — 6360000002 HC RX W HCPCS: Performed by: INTERNAL MEDICINE

## 2019-12-03 PROCEDURE — 80048 BASIC METABOLIC PNL TOTAL CA: CPT

## 2019-12-03 PROCEDURE — 2580000003 HC RX 258: Performed by: INTERNAL MEDICINE

## 2019-12-03 PROCEDURE — 36415 COLL VENOUS BLD VENIPUNCTURE: CPT

## 2019-12-03 PROCEDURE — 97116 GAIT TRAINING THERAPY: CPT | Performed by: PHYSICAL THERAPIST

## 2019-12-03 PROCEDURE — 85027 COMPLETE CBC AUTOMATED: CPT

## 2019-12-03 RX ORDER — MAGNESIUM SULFATE IN WATER 40 MG/ML
2 INJECTION, SOLUTION INTRAVENOUS ONCE
Status: COMPLETED | OUTPATIENT
Start: 2019-12-03 | End: 2019-12-03

## 2019-12-03 RX ADMIN — AMIODARONE HYDROCHLORIDE 200 MG: 200 TABLET ORAL at 10:35

## 2019-12-03 RX ADMIN — RIFAXIMIN 550 MG: 550 TABLET ORAL at 10:35

## 2019-12-03 RX ADMIN — LACTULOSE 20 G: 20 SOLUTION ORAL at 10:35

## 2019-12-03 RX ADMIN — PIPERACILLIN AND TAZOBACTAM 3.38 G: 3; .375 INJECTION, POWDER, LYOPHILIZED, FOR SOLUTION INTRAVENOUS at 05:51

## 2019-12-03 RX ADMIN — SODIUM CHLORIDE, PRESERVATIVE FREE 10 ML: 5 INJECTION INTRAVENOUS at 10:36

## 2019-12-03 RX ADMIN — SODIUM CHLORIDE, PRESERVATIVE FREE 10 ML: 5 INJECTION INTRAVENOUS at 22:28

## 2019-12-03 RX ADMIN — Medication 1 CAPSULE: at 10:35

## 2019-12-03 RX ADMIN — ONDANSETRON 4 MG: 2 INJECTION INTRAMUSCULAR; INTRAVENOUS at 22:22

## 2019-12-03 RX ADMIN — DULOXETINE HYDROCHLORIDE 30 MG: 30 CAPSULE, DELAYED RELEASE ORAL at 10:35

## 2019-12-03 RX ADMIN — POTASSIUM BICARBONATE 40 MEQ: 782 TABLET, EFFERVESCENT ORAL at 17:42

## 2019-12-03 RX ADMIN — LACTULOSE 20 G: 20 SOLUTION ORAL at 15:13

## 2019-12-03 RX ADMIN — MAGNESIUM SULFATE HEPTAHYDRATE 2 G: 40 INJECTION, SOLUTION INTRAVENOUS at 17:42

## 2019-12-03 RX ADMIN — FOLIC ACID 1 MG: 1 TABLET ORAL at 10:36

## 2019-12-03 RX ADMIN — Medication 1 CAPSULE: at 17:42

## 2019-12-03 ASSESSMENT — PAIN SCALES - GENERAL: PAINLEVEL_OUTOF10: 0

## 2019-12-04 VITALS
OXYGEN SATURATION: 94 % | RESPIRATION RATE: 18 BRPM | WEIGHT: 191.8 LBS | SYSTOLIC BLOOD PRESSURE: 116 MMHG | HEART RATE: 68 BPM | TEMPERATURE: 98.3 F | DIASTOLIC BLOOD PRESSURE: 58 MMHG | HEIGHT: 72 IN | BODY MASS INDEX: 25.98 KG/M2

## 2019-12-04 LAB
ANION GAP SERPL CALCULATED.3IONS-SCNC: 9 MMOL/L (ref 3–16)
BLOOD CULTURE, ROUTINE: NORMAL
BUN BLDV-MCNC: 9 MG/DL (ref 7–20)
CALCIUM SERPL-MCNC: 9.1 MG/DL (ref 8.3–10.6)
CHLORIDE BLD-SCNC: 107 MMOL/L (ref 99–110)
CO2: 22 MMOL/L (ref 21–32)
CREAT SERPL-MCNC: 0.8 MG/DL (ref 0.8–1.3)
CULTURE, BLOOD 2: NORMAL
GFR AFRICAN AMERICAN: >60
GFR NON-AFRICAN AMERICAN: >60
GLUCOSE BLD-MCNC: 100 MG/DL (ref 70–99)
HCT VFR BLD CALC: 31.5 % (ref 40.5–52.5)
HEMOGLOBIN: 10.7 G/DL (ref 13.5–17.5)
MAGNESIUM: 2 MG/DL (ref 1.8–2.4)
MCH RBC QN AUTO: 34.8 PG (ref 26–34)
MCHC RBC AUTO-ENTMCNC: 33.9 G/DL (ref 31–36)
MCV RBC AUTO: 102.6 FL (ref 80–100)
PDW BLD-RTO: 22.8 % (ref 12.4–15.4)
PLATELET # BLD: 67 K/UL (ref 135–450)
PMV BLD AUTO: 8.7 FL (ref 5–10.5)
POTASSIUM SERPL-SCNC: 3.2 MMOL/L (ref 3.5–5.1)
RBC # BLD: 3.07 M/UL (ref 4.2–5.9)
SODIUM BLD-SCNC: 138 MMOL/L (ref 136–145)
WBC # BLD: 5.7 K/UL (ref 4–11)

## 2019-12-04 PROCEDURE — 85027 COMPLETE CBC AUTOMATED: CPT

## 2019-12-04 PROCEDURE — 80048 BASIC METABOLIC PNL TOTAL CA: CPT

## 2019-12-04 PROCEDURE — 36415 COLL VENOUS BLD VENIPUNCTURE: CPT

## 2019-12-04 PROCEDURE — 6370000000 HC RX 637 (ALT 250 FOR IP): Performed by: INTERNAL MEDICINE

## 2019-12-04 PROCEDURE — 2580000003 HC RX 258: Performed by: INTERNAL MEDICINE

## 2019-12-04 PROCEDURE — 83735 ASSAY OF MAGNESIUM: CPT

## 2019-12-04 PROCEDURE — 94760 N-INVAS EAR/PLS OXIMETRY 1: CPT

## 2019-12-04 RX ADMIN — LACTULOSE 20 G: 20 SOLUTION ORAL at 10:33

## 2019-12-04 RX ADMIN — RIFAXIMIN 550 MG: 550 TABLET ORAL at 10:33

## 2019-12-04 RX ADMIN — Medication 1 CAPSULE: at 10:33

## 2019-12-04 RX ADMIN — FOLIC ACID 1 MG: 1 TABLET ORAL at 10:35

## 2019-12-04 RX ADMIN — POTASSIUM BICARBONATE 40 MEQ: 782 TABLET, EFFERVESCENT ORAL at 10:33

## 2019-12-04 RX ADMIN — AMIODARONE HYDROCHLORIDE 200 MG: 200 TABLET ORAL at 10:35

## 2019-12-04 RX ADMIN — DULOXETINE HYDROCHLORIDE 30 MG: 30 CAPSULE, DELAYED RELEASE ORAL at 10:35

## 2019-12-04 RX ADMIN — SODIUM CHLORIDE, PRESERVATIVE FREE 10 ML: 5 INJECTION INTRAVENOUS at 10:40

## 2019-12-04 ASSESSMENT — PAIN SCALES - GENERAL: PAINLEVEL_OUTOF10: 0

## 2019-12-23 ENCOUNTER — APPOINTMENT (OUTPATIENT)
Dept: GENERAL RADIOLOGY | Age: 69
DRG: 433 | End: 2019-12-23
Payer: MEDICARE

## 2019-12-23 ENCOUNTER — HOSPITAL ENCOUNTER (INPATIENT)
Age: 69
LOS: 5 days | Discharge: NURSING, REHAB OR LONG-TERM CARE | DRG: 433 | End: 2019-12-28
Attending: EMERGENCY MEDICINE | Admitting: INTERNAL MEDICINE
Payer: MEDICARE

## 2019-12-23 LAB
A/G RATIO: 0.5 (ref 1.1–2.2)
ALBUMIN SERPL-MCNC: 2.5 G/DL (ref 3.4–5)
ALP BLD-CCNC: 136 U/L (ref 40–129)
ALT SERPL-CCNC: 20 U/L (ref 10–40)
AMMONIA: 202 UMOL/L (ref 16–60)
AMORPHOUS: ABNORMAL /HPF
ANION GAP SERPL CALCULATED.3IONS-SCNC: 15 MMOL/L (ref 3–16)
AST SERPL-CCNC: 45 U/L (ref 15–37)
BACTERIA: ABNORMAL /HPF
BASOPHILS ABSOLUTE: 0.1 K/UL (ref 0–0.2)
BASOPHILS RELATIVE PERCENT: 1.3 %
BILIRUB SERPL-MCNC: 3.2 MG/DL (ref 0–1)
BILIRUBIN URINE: NEGATIVE
BLOOD, URINE: ABNORMAL
BUN BLDV-MCNC: 28 MG/DL (ref 7–20)
CALCIUM SERPL-MCNC: 10.5 MG/DL (ref 8.3–10.6)
CHLORIDE BLD-SCNC: 97 MMOL/L (ref 99–110)
CLARITY: ABNORMAL
CO2: 27 MMOL/L (ref 21–32)
COLOR: ABNORMAL
CREAT SERPL-MCNC: 1.7 MG/DL (ref 0.8–1.3)
EOSINOPHILS ABSOLUTE: 0.1 K/UL (ref 0–0.6)
EOSINOPHILS RELATIVE PERCENT: 1.9 %
EPITHELIAL CELLS, UA: 1 /HPF (ref 0–5)
GFR AFRICAN AMERICAN: 49
GFR NON-AFRICAN AMERICAN: 40
GLOBULIN: 4.7 G/DL
GLUCOSE BLD-MCNC: 104 MG/DL (ref 70–99)
GLUCOSE URINE: NEGATIVE MG/DL
HCT VFR BLD CALC: 41.9 % (ref 40.5–52.5)
HEMOGLOBIN: 14 G/DL (ref 13.5–17.5)
HYALINE CASTS: 3 /LPF (ref 0–8)
INR BLD: 1.3 (ref 0.86–1.14)
KETONES, URINE: NEGATIVE MG/DL
LACTIC ACID, SEPSIS: 2.5 MMOL/L (ref 0.4–1.9)
LACTIC ACID, SEPSIS: 2.9 MMOL/L (ref 0.4–1.9)
LEUKOCYTE ESTERASE, URINE: ABNORMAL
LYMPHOCYTES ABSOLUTE: 2 K/UL (ref 1–5.1)
LYMPHOCYTES RELATIVE PERCENT: 31.2 %
MAGNESIUM: 2.1 MG/DL (ref 1.8–2.4)
MCH RBC QN AUTO: 35.1 PG (ref 26–34)
MCHC RBC AUTO-ENTMCNC: 33.6 G/DL (ref 31–36)
MCV RBC AUTO: 104.7 FL (ref 80–100)
MICROSCOPIC EXAMINATION: YES
MONOCYTES ABSOLUTE: 0.4 K/UL (ref 0–1.3)
MONOCYTES RELATIVE PERCENT: 7 %
NEUTROPHILS ABSOLUTE: 3.8 K/UL (ref 1.7–7.7)
NEUTROPHILS RELATIVE PERCENT: 58.6 %
NITRITE, URINE: NEGATIVE
PDW BLD-RTO: 22.1 % (ref 12.4–15.4)
PH UA: 5.5 (ref 5–8)
PLATELET # BLD: 82 K/UL (ref 135–450)
PMV BLD AUTO: 9.2 FL (ref 5–10.5)
POTASSIUM SERPL-SCNC: 3.7 MMOL/L (ref 3.5–5.1)
PROTEIN UA: ABNORMAL MG/DL
PROTHROMBIN TIME: 15.1 SEC (ref 10–13.2)
RBC # BLD: 4 M/UL (ref 4.2–5.9)
RBC UA: 0 /HPF (ref 0–4)
SODIUM BLD-SCNC: 139 MMOL/L (ref 136–145)
SPECIFIC GRAVITY UA: 1.02 (ref 1–1.03)
TOTAL PROTEIN: 7.2 G/DL (ref 6.4–8.2)
URINE REFLEX TO CULTURE: YES
URINE TYPE: ABNORMAL
UROBILINOGEN, URINE: 0.2 E.U./DL
WBC # BLD: 6.4 K/UL (ref 4–11)
WBC UA: 203 /HPF (ref 0–5)

## 2019-12-23 PROCEDURE — 2580000003 HC RX 258: Performed by: EMERGENCY MEDICINE

## 2019-12-23 PROCEDURE — 99285 EMERGENCY DEPT VISIT HI MDM: CPT

## 2019-12-23 PROCEDURE — 6370000000 HC RX 637 (ALT 250 FOR IP): Performed by: INTERNAL MEDICINE

## 2019-12-23 PROCEDURE — 81001 URINALYSIS AUTO W/SCOPE: CPT

## 2019-12-23 PROCEDURE — 2580000003 HC RX 258: Performed by: INTERNAL MEDICINE

## 2019-12-23 PROCEDURE — 93005 ELECTROCARDIOGRAM TRACING: CPT | Performed by: EMERGENCY MEDICINE

## 2019-12-23 PROCEDURE — 85025 COMPLETE CBC W/AUTO DIFF WBC: CPT

## 2019-12-23 PROCEDURE — 36415 COLL VENOUS BLD VENIPUNCTURE: CPT

## 2019-12-23 PROCEDURE — 6370000000 HC RX 637 (ALT 250 FOR IP): Performed by: EMERGENCY MEDICINE

## 2019-12-23 PROCEDURE — 87040 BLOOD CULTURE FOR BACTERIA: CPT

## 2019-12-23 PROCEDURE — 71045 X-RAY EXAM CHEST 1 VIEW: CPT

## 2019-12-23 PROCEDURE — 96365 THER/PROPH/DIAG IV INF INIT: CPT

## 2019-12-23 PROCEDURE — 1200000000 HC SEMI PRIVATE

## 2019-12-23 PROCEDURE — 6360000002 HC RX W HCPCS: Performed by: EMERGENCY MEDICINE

## 2019-12-23 PROCEDURE — 85610 PROTHROMBIN TIME: CPT

## 2019-12-23 PROCEDURE — 87086 URINE CULTURE/COLONY COUNT: CPT

## 2019-12-23 PROCEDURE — 83605 ASSAY OF LACTIC ACID: CPT

## 2019-12-23 PROCEDURE — 80053 COMPREHEN METABOLIC PANEL: CPT

## 2019-12-23 PROCEDURE — 82140 ASSAY OF AMMONIA: CPT

## 2019-12-23 PROCEDURE — 83735 ASSAY OF MAGNESIUM: CPT

## 2019-12-23 RX ORDER — 0.9 % SODIUM CHLORIDE 0.9 %
30 INTRAVENOUS SOLUTION INTRAVENOUS ONCE
Status: COMPLETED | OUTPATIENT
Start: 2019-12-23 | End: 2019-12-23

## 2019-12-23 RX ORDER — LACTULOSE 10 G/15ML
30 SOLUTION ORAL ONCE
Status: DISCONTINUED | OUTPATIENT
Start: 2019-12-23 | End: 2019-12-24

## 2019-12-23 RX ADMIN — SODIUM CHLORIDE 1752 ML: 9 INJECTION, SOLUTION INTRAVENOUS at 20:29

## 2019-12-23 RX ADMIN — CEFTRIAXONE 1 G: 1 INJECTION, POWDER, FOR SOLUTION INTRAMUSCULAR; INTRAVENOUS at 20:29

## 2019-12-23 RX ADMIN — WATER: 100 IRRIGANT IRRIGATION at 21:47

## 2019-12-23 ASSESSMENT — PAIN SCALES - WONG BAKER: WONGBAKER_NUMERICALRESPONSE: 0

## 2019-12-23 ASSESSMENT — PAIN SCALES - GENERAL: PAINLEVEL_OUTOF10: 0
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
## 2019-12-23 NOTE — ED NOTES
Pt has suprapubic catheter.  Drainage bag changed    Pt red on coccyx,sacrum,scrotum      Rosalie Matthews, RN  12/23/19 7079

## 2019-12-24 LAB
EKG ATRIAL RATE: 64 BPM
EKG DIAGNOSIS: NORMAL
EKG P AXIS: 13 DEGREES
EKG P-R INTERVAL: 178 MS
EKG Q-T INTERVAL: 536 MS
EKG QRS DURATION: 122 MS
EKG QTC CALCULATION (BAZETT): 552 MS
EKG R AXIS: 25 DEGREES
EKG T AXIS: 24 DEGREES
EKG VENTRICULAR RATE: 64 BPM

## 2019-12-24 PROCEDURE — 93010 ELECTROCARDIOGRAM REPORT: CPT | Performed by: INTERNAL MEDICINE

## 2019-12-24 PROCEDURE — 6360000002 HC RX W HCPCS: Performed by: INTERNAL MEDICINE

## 2019-12-24 PROCEDURE — 2580000003 HC RX 258: Performed by: INTERNAL MEDICINE

## 2019-12-24 PROCEDURE — 6370000000 HC RX 637 (ALT 250 FOR IP): Performed by: HOSPITALIST

## 2019-12-24 PROCEDURE — 1200000000 HC SEMI PRIVATE

## 2019-12-24 RX ORDER — LACTULOSE 10 G/15ML
30 SOLUTION ORAL 3 TIMES DAILY
Status: DISCONTINUED | OUTPATIENT
Start: 2019-12-24 | End: 2019-12-25

## 2019-12-24 RX ORDER — TORSEMIDE 20 MG/1
40 TABLET ORAL DAILY
COMMUNITY

## 2019-12-24 RX ORDER — TAMSULOSIN HYDROCHLORIDE 0.4 MG/1
0.4 CAPSULE ORAL DAILY
Status: DISCONTINUED | OUTPATIENT
Start: 2019-12-24 | End: 2019-12-28 | Stop reason: HOSPADM

## 2019-12-24 RX ORDER — ONDANSETRON 2 MG/ML
4 INJECTION INTRAMUSCULAR; INTRAVENOUS EVERY 6 HOURS PRN
Status: DISCONTINUED | OUTPATIENT
Start: 2019-12-24 | End: 2019-12-28 | Stop reason: HOSPADM

## 2019-12-24 RX ORDER — MIDODRINE HYDROCHLORIDE 5 MG/1
5 TABLET ORAL
Status: DISCONTINUED | OUTPATIENT
Start: 2019-12-24 | End: 2019-12-28 | Stop reason: HOSPADM

## 2019-12-24 RX ORDER — MORPHINE SULFATE 100 MG/5ML
5 SOLUTION ORAL EVERY 4 HOURS PRN
COMMUNITY

## 2019-12-24 RX ORDER — TORSEMIDE 20 MG/1
20 TABLET ORAL DAILY
Status: DISCONTINUED | OUTPATIENT
Start: 2019-12-24 | End: 2019-12-25

## 2019-12-24 RX ORDER — POTASSIUM CHLORIDE 1500 MG/1
40 TABLET, FILM COATED, EXTENDED RELEASE ORAL 2 TIMES DAILY
COMMUNITY

## 2019-12-24 RX ORDER — SPIRONOLACTONE 50 MG/1
100 TABLET, FILM COATED ORAL DAILY
COMMUNITY

## 2019-12-24 RX ORDER — SODIUM CHLORIDE 0.9 % (FLUSH) 0.9 %
10 SYRINGE (ML) INJECTION PRN
Status: DISCONTINUED | OUTPATIENT
Start: 2019-12-24 | End: 2019-12-28 | Stop reason: HOSPADM

## 2019-12-24 RX ORDER — LORAZEPAM 2 MG/ML
0.5 CONCENTRATE ORAL EVERY 4 HOURS PRN
COMMUNITY

## 2019-12-24 RX ORDER — POTASSIUM CHLORIDE 7.45 MG/ML
10 INJECTION INTRAVENOUS PRN
Status: DISCONTINUED | OUTPATIENT
Start: 2019-12-24 | End: 2019-12-28

## 2019-12-24 RX ORDER — THIAMINE MONONITRATE (VIT B1) 100 MG
100 TABLET ORAL DAILY
COMMUNITY

## 2019-12-24 RX ORDER — MAGNESIUM SULFATE 1 G/100ML
1 INJECTION INTRAVENOUS PRN
Status: DISCONTINUED | OUTPATIENT
Start: 2019-12-24 | End: 2019-12-28 | Stop reason: HOSPADM

## 2019-12-24 RX ORDER — SODIUM CHLORIDE, SODIUM LACTATE, POTASSIUM CHLORIDE, CALCIUM CHLORIDE 600; 310; 30; 20 MG/100ML; MG/100ML; MG/100ML; MG/100ML
INJECTION, SOLUTION INTRAVENOUS CONTINUOUS
Status: DISCONTINUED | OUTPATIENT
Start: 2019-12-24 | End: 2019-12-24

## 2019-12-24 RX ORDER — NADOLOL 20 MG/1
20 TABLET ORAL DAILY
Status: DISCONTINUED | OUTPATIENT
Start: 2019-12-24 | End: 2019-12-25

## 2019-12-24 RX ORDER — AMIODARONE HYDROCHLORIDE 200 MG/1
200 TABLET ORAL DAILY
Status: DISCONTINUED | OUTPATIENT
Start: 2019-12-24 | End: 2019-12-28 | Stop reason: HOSPADM

## 2019-12-24 RX ORDER — SODIUM CHLORIDE 0.9 % (FLUSH) 0.9 %
10 SYRINGE (ML) INJECTION EVERY 12 HOURS SCHEDULED
Status: DISCONTINUED | OUTPATIENT
Start: 2019-12-24 | End: 2019-12-28 | Stop reason: HOSPADM

## 2019-12-24 RX ORDER — ONDANSETRON 4 MG/1
4 TABLET, FILM COATED ORAL EVERY 6 HOURS PRN
COMMUNITY

## 2019-12-24 RX ORDER — DULOXETIN HYDROCHLORIDE 20 MG/1
20 CAPSULE, DELAYED RELEASE ORAL DAILY
Status: DISCONTINUED | OUTPATIENT
Start: 2019-12-24 | End: 2019-12-28 | Stop reason: HOSPADM

## 2019-12-24 RX ADMIN — CEFEPIME HYDROCHLORIDE 2 G: 2 INJECTION, POWDER, FOR SOLUTION INTRAVENOUS at 05:52

## 2019-12-24 RX ADMIN — RIFAXIMIN 550 MG: 550 TABLET ORAL at 21:38

## 2019-12-24 RX ADMIN — LACTULOSE 30 G: 20 SOLUTION ORAL at 15:41

## 2019-12-24 RX ADMIN — AMIODARONE HYDROCHLORIDE 200 MG: 200 TABLET ORAL at 15:42

## 2019-12-24 RX ADMIN — CEFEPIME HYDROCHLORIDE 2 G: 2 INJECTION, POWDER, FOR SOLUTION INTRAVENOUS at 15:41

## 2019-12-24 RX ADMIN — NADOLOL 20 MG: 20 TABLET ORAL at 17:43

## 2019-12-24 RX ADMIN — LACTULOSE 30 G: 20 SOLUTION ORAL at 21:38

## 2019-12-24 RX ADMIN — MIDODRINE HYDROCHLORIDE 5 MG: 5 TABLET ORAL at 17:43

## 2019-12-24 RX ADMIN — TAMSULOSIN HYDROCHLORIDE 0.4 MG: 0.4 CAPSULE ORAL at 15:42

## 2019-12-24 RX ADMIN — TORSEMIDE 20 MG: 20 TABLET ORAL at 15:42

## 2019-12-24 RX ADMIN — RIFAXIMIN 550 MG: 550 TABLET ORAL at 15:42

## 2019-12-24 RX ADMIN — DULOXETINE HYDROCHLORIDE 20 MG: 20 CAPSULE, DELAYED RELEASE ORAL at 15:42

## 2019-12-24 RX ADMIN — Medication 10 ML: at 21:39

## 2019-12-24 RX ADMIN — SODIUM CHLORIDE, POTASSIUM CHLORIDE, SODIUM LACTATE AND CALCIUM CHLORIDE: 600; 310; 30; 20 INJECTION, SOLUTION INTRAVENOUS at 00:54

## 2019-12-24 ASSESSMENT — PAIN SCALES - GENERAL: PAINLEVEL_OUTOF10: 0

## 2019-12-24 NOTE — PROGRESS NOTES
4 Eyes Skin Assessment     The patient is being assess for  Admission    I agree that 2 RN's have performed a thorough Head to Toe Skin Assessment on the patient. ALL assessment sites listed below have been assessed. Areas assessed by both nurses:  [x]   Head, Face, and Ears   [x]   Shoulders, Back, and Chest  [x]   Arms, Elbows, and Hands   [x]   Coccyx, Sacrum, and IschIum  [x]   Legs, Feet, and Heels        Does the Patient have Skin Breakdown? Yes- abrasion RFA/RAC.  Redness/excoriation buttocks, periarea and scrotum        Julio Prevention initiated:  Yes   Wound Care Orders initiated:  NA      [facility] nurse consulted for Pressure Injury (Stage 3,4, Unstageable, DTI, NWPT, and Complex wounds), New and Established Ostomies:  NA      Nurse 1 eSignature: Electronically signed by Barbara Hernández RN on 12/24/2019 at 12:58 AM      **SHARE this note so that the co-signing nurse is able to place an eSignature**    Nurse 2 eSignature: Electronically signed by Luciano Bush RN on 12/24/19 at 2:21 AM

## 2019-12-24 NOTE — CARE COORDINATION
Chart reviewed and patient from [facility] with [facility]. Call to [facility] who confirms they would be able to accept patient back if needed. She would need to determine how patient was paying. Met with patient and sister Polly Smith at bedside. She confirms this was the plan but wife is planning to take patient home with hospice services after this admission. Call to wife Katrinka Severe ([phone]). She confirms plan is to take patient home with [facility]. She is aware she will need to revoke services due to hospital stay and re-enroll. She has spoken with Christine Dudley ([phone]). Patient has hospital bed, wheelchair, adult depends, BSC, and \"various little things\". Call to Chantal with [facility] who confirms she has discussed this with wife and current plan is to dc with hospice at home. Will contact wife to meet with her today to complete necessary paperwork. Chantal ([phone]) at [facility] will return call to  after meeting with wife to confirm plan. Electronically signed by KEN Matson on 12/24/2019 at 11:10 AM      Call from [facility] with [facility]. They are unsure if they are able to accept back on service due to MD's concerns of patient returning to hospital and not following hospice philosophy. Contact Chantal when ready to discharge to determine if able to accept back.     Electronically signed by KEN Matson on 12/24/2019 at 12:20 PM

## 2019-12-24 NOTE — PLAN OF CARE
Problem: Nutrition  Goal: Optimal nutrition therapy  Outcome: Ongoing   Nutrition Problem: Inadequate oral intake  Intervention: Food and/or Nutrient Delivery: Continue NPO  Nutritional Goals: tolerate most appropriate form of nutrition per MD.

## 2019-12-24 NOTE — ED NOTES
MD Mey Matthews made aware of critical Ammonia level of 202.  Will continue to monitor     Omar Galeas RN  12/23/19 1286

## 2019-12-24 NOTE — CONSULTS
Nutrition Assessment    Type and Reason for Visit: Consult(Nutrition assessment for Julio Score)    Nutrition Recommendations:   NPO. Start supplement when diet advances    Nutrition Assessment: Pt nutritionally compromised AEB suspected poor PO intakes, wt loss, and end stage liver disease with hospice on board at home. Pt admitted for change in mental status and more lethargic. Pt with 17% wt loss in the last month and had poor PO intakes last admission. Will start supplement when diet advances.     Malnutrition Assessment:  · Malnutrition Status: Insufficient data    Nutrition Risk Level: High    Nutrient Needs:  · Estimated Daily Total Kcal: 9258-6672 kcal (25-28 kcal/kg CBW)  · Estimated Daily Protein (g): 72-86 gm (1-1.2gm/kg CBW)  · Estimated Daily Total Fluid (ml/day): 1 mL/kcal    Nutrition Diagnosis:   · Problem: Inadequate oral intake  · Etiology: related to Insufficient energy/nutrient consumption     Signs and symptoms:  as evidenced by Diet history of poor intake, Weight loss greater than or equal to 5% in 1 month    Objective Information:  · Wound Type: None  · Current Nutrition Therapies:  · Oral Diet Orders: NPO   · Oral Diet intake: NPO  · Oral Nutrition Supplement (ONS) Orders: None  · ONS intake: NPO  · Anthropometric Measures:  · Ht: 6' (182.9 cm)   · Current Body Wt: 58 lb (26.3 kg)  · % Weight Change:   -17% x 1 month  · Ideal Body Wt: 178 lb (80.7 kg)   · BMI Classification: BMI 18.5 - 24.9 Normal Weight    Nutrition Interventions:   Continue NPO  Continued Inpatient Monitoring    Nutrition Evaluation:   · Evaluation: Goals set   · Goals: tolerate most appropriate form of nutrition per MD.    · Monitoring: Nutrition Progression, Skin Integrity, Mental Status/Confusion, Weight, Pertinent Labs      Electronically signed by Lily Blackburn RD, LD on 12/24/19 at 10:52 AM    Contact Number: [phone]

## 2019-12-24 NOTE — H&P
to light, around 3 mm bilaterally. ENT:  No oral lesions. RESPIRATORY SYSTEM:  Clear  CARDIOVASCULAR:  S1, S2 are heard. No murmurs or rubs. ABDOMEN:  Soft. The patient has got a suprapubic cystostomy. MUSCULOSKELETAL EXAM:  No acute deformities. SKIN:  Without any obvious rashes. DIAGNOSTIC DATA:  INR 1.3.  UA shows moderate leukocyte esterase with 3+  bacteria, 203 white cells. Ammonia 202. Lactic acid 2.5. Chest x-ray  shows no acute anomaly. BUN 20, creatinine 1.7. Sodium 139, potassium  3.7, total bilirubin 3.2, alk phos 136, ALT 20, AST 45. REVIEW OF PREVIOUS RECORDS:  Shows a recent admission to the hospitalist  service when the patient was discharged on 12/04/2019. At which time,  the diagnosis was alcoholic cirrhosis and in the prior admission, the  patient was actually seen by me. ASSESSMENT:  1. Recurrent acute hepatic encephalopathy due to alcoholic liver  cirrhosis with hypersplenism and pancytopenia. 2.  Complicated gram-negative bacterial urinary tract infection due to  infected suprapubic cystostomy. PLAN OF CARE:  The patient is admitted to Internal Medicine service. We  will continue the patient on IV cefepime instead of ceftriaxone given  the patient's recent admission to the hospital and current presence in a  facility with possibility for healthcare associated infection. Hydration will be continued. Lactulose enema has been administered. Once the patient's mentation improves, we will initiate the patient on  oral regimen. CODE STATUS:  Currently full based on the patient's family members and  significant others' recommendation. EXPECTED LENGTH OF STAY:  More than two midnights based on the plan of  care above. RISK:  High due to the patient's presentation with the recurrent hepatic  encephalopathy and a complicated urinary tract infection. DISPOSITION:  Admitted to Internal Medicine service.         Sofia Ricketts MD    D: 12/24/2019 2:19:10 T: 12/24/2019 3:58:16     [email or web address]  Job#: [id number]     Doc#: [id number]    CC:

## 2019-12-24 NOTE — PROGRESS NOTES
Pt admitted into room 4252. Pt arousable- opens eyes to touch and sometimes voice. VS stable. Suprapubic catheter in place draining yellow urine. Bed alarm on. Call light in reach. Will continue to monitor.

## 2019-12-24 NOTE — PROGRESS NOTES
Patient tolerated PO fluids with no s/s of aspiration as chocking/coughing. Tolerated meds one at a time with no issues.  Electronically signed by Binta Jacobsen RN on 12/24/2019 at 4:26 PM

## 2019-12-24 NOTE — PROGRESS NOTES
H/p dictation id [id number]. Date of service 12/23/2019. Recurrent hepatic encephalopathy. Complicated UTI. Alcoholic cirrhosis. Hypersplenism.

## 2019-12-25 LAB
A/G RATIO: 0.7 (ref 1.1–2.2)
ALBUMIN SERPL-MCNC: 2.4 G/DL (ref 3.4–5)
ALP BLD-CCNC: 96 U/L (ref 40–129)
ALT SERPL-CCNC: 15 U/L (ref 10–40)
ANION GAP SERPL CALCULATED.3IONS-SCNC: 12 MMOL/L (ref 3–16)
ANION GAP SERPL CALCULATED.3IONS-SCNC: 15 MMOL/L (ref 3–16)
AST SERPL-CCNC: 35 U/L (ref 15–37)
BILIRUB SERPL-MCNC: 2.3 MG/DL (ref 0–1)
BUN BLDV-MCNC: 29 MG/DL (ref 7–20)
BUN BLDV-MCNC: 31 MG/DL (ref 7–20)
CALCIUM SERPL-MCNC: 9 MG/DL (ref 8.3–10.6)
CALCIUM SERPL-MCNC: 9.3 MG/DL (ref 8.3–10.6)
CHLORIDE BLD-SCNC: 100 MMOL/L (ref 99–110)
CHLORIDE BLD-SCNC: 100 MMOL/L (ref 99–110)
CO2: 22 MMOL/L (ref 21–32)
CO2: 23 MMOL/L (ref 21–32)
CREAT SERPL-MCNC: 1.8 MG/DL (ref 0.8–1.3)
CREAT SERPL-MCNC: 1.9 MG/DL (ref 0.8–1.3)
GFR AFRICAN AMERICAN: 43
GFR AFRICAN AMERICAN: 46
GFR NON-AFRICAN AMERICAN: 35
GFR NON-AFRICAN AMERICAN: 38
GLOBULIN: 3.4 G/DL
GLUCOSE BLD-MCNC: 108 MG/DL (ref 70–99)
GLUCOSE BLD-MCNC: 158 MG/DL (ref 70–99)
MAGNESIUM: 1.9 MG/DL (ref 1.8–2.4)
POTASSIUM SERPL-SCNC: 2.8 MMOL/L (ref 3.5–5.1)
POTASSIUM SERPL-SCNC: 3.1 MMOL/L (ref 3.5–5.1)
SODIUM BLD-SCNC: 134 MMOL/L (ref 136–145)
SODIUM BLD-SCNC: 138 MMOL/L (ref 136–145)
TOTAL PROTEIN: 5.8 G/DL (ref 6.4–8.2)
URINE CULTURE, ROUTINE: NORMAL

## 2019-12-25 PROCEDURE — 6360000002 HC RX W HCPCS: Performed by: INTERNAL MEDICINE

## 2019-12-25 PROCEDURE — 6370000000 HC RX 637 (ALT 250 FOR IP): Performed by: HOSPITALIST

## 2019-12-25 PROCEDURE — 2580000003 HC RX 258: Performed by: HOSPITALIST

## 2019-12-25 PROCEDURE — 80053 COMPREHEN METABOLIC PANEL: CPT

## 2019-12-25 PROCEDURE — 2580000003 HC RX 258: Performed by: INTERNAL MEDICINE

## 2019-12-25 PROCEDURE — 36415 COLL VENOUS BLD VENIPUNCTURE: CPT

## 2019-12-25 PROCEDURE — 83735 ASSAY OF MAGNESIUM: CPT

## 2019-12-25 PROCEDURE — 1200000000 HC SEMI PRIVATE

## 2019-12-25 PROCEDURE — 94760 N-INVAS EAR/PLS OXIMETRY 1: CPT

## 2019-12-25 RX ORDER — 0.9 % SODIUM CHLORIDE 0.9 %
500 INTRAVENOUS SOLUTION INTRAVENOUS ONCE
Status: COMPLETED | OUTPATIENT
Start: 2019-12-25 | End: 2019-12-25

## 2019-12-25 RX ORDER — 0.9 % SODIUM CHLORIDE 0.9 %
1000 INTRAVENOUS SOLUTION INTRAVENOUS ONCE
Status: COMPLETED | OUTPATIENT
Start: 2019-12-25 | End: 2019-12-25

## 2019-12-25 RX ORDER — PANTOPRAZOLE SODIUM 40 MG/1
40 TABLET, DELAYED RELEASE ORAL
Status: DISCONTINUED | OUTPATIENT
Start: 2019-12-26 | End: 2019-12-28 | Stop reason: HOSPADM

## 2019-12-25 RX ADMIN — TAMSULOSIN HYDROCHLORIDE 0.4 MG: 0.4 CAPSULE ORAL at 08:20

## 2019-12-25 RX ADMIN — MIDODRINE HYDROCHLORIDE 5 MG: 5 TABLET ORAL at 08:20

## 2019-12-25 RX ADMIN — LACTULOSE 30 G: 20 SOLUTION ORAL at 15:35

## 2019-12-25 RX ADMIN — RIFAXIMIN 550 MG: 550 TABLET ORAL at 20:15

## 2019-12-25 RX ADMIN — MIDODRINE HYDROCHLORIDE 5 MG: 5 TABLET ORAL at 12:26

## 2019-12-25 RX ADMIN — NADOLOL 20 MG: 20 TABLET ORAL at 08:20

## 2019-12-25 RX ADMIN — CEFEPIME HYDROCHLORIDE 2 G: 2 INJECTION, POWDER, FOR SOLUTION INTRAVENOUS at 03:33

## 2019-12-25 RX ADMIN — LACTULOSE 30 G: 20 SOLUTION ORAL at 08:19

## 2019-12-25 RX ADMIN — AMIODARONE HYDROCHLORIDE 200 MG: 200 TABLET ORAL at 08:20

## 2019-12-25 RX ADMIN — Medication 10 MEQ: at 19:45

## 2019-12-25 RX ADMIN — Medication 10 MEQ: at 21:18

## 2019-12-25 RX ADMIN — SODIUM CHLORIDE 500 ML: 9 INJECTION, SOLUTION INTRAVENOUS at 18:41

## 2019-12-25 RX ADMIN — SODIUM CHLORIDE 1000 ML: 9 INJECTION, SOLUTION INTRAVENOUS at 12:28

## 2019-12-25 RX ADMIN — Medication 10 MEQ: at 18:18

## 2019-12-25 RX ADMIN — TORSEMIDE 20 MG: 20 TABLET ORAL at 08:20

## 2019-12-25 RX ADMIN — Medication 10 ML: at 20:16

## 2019-12-25 RX ADMIN — Medication 10 MEQ: at 13:36

## 2019-12-25 RX ADMIN — Medication 10 MEQ: at 22:24

## 2019-12-25 RX ADMIN — Medication 10 MEQ: at 08:19

## 2019-12-25 RX ADMIN — Medication 10 MEQ: at 09:38

## 2019-12-25 RX ADMIN — CEFEPIME HYDROCHLORIDE 2 G: 2 INJECTION, POWDER, FOR SOLUTION INTRAVENOUS at 15:35

## 2019-12-25 RX ADMIN — MIDODRINE HYDROCHLORIDE 5 MG: 5 TABLET ORAL at 18:04

## 2019-12-25 RX ADMIN — Medication 10 MEQ: at 11:25

## 2019-12-25 RX ADMIN — DULOXETINE HYDROCHLORIDE 20 MG: 20 CAPSULE, DELAYED RELEASE ORAL at 08:20

## 2019-12-25 RX ADMIN — RIFAXIMIN 550 MG: 550 TABLET ORAL at 15:35

## 2019-12-25 RX ADMIN — Medication 10 MEQ: at 12:26

## 2019-12-25 RX ADMIN — Medication 10 MEQ: at 10:45

## 2019-12-25 RX ADMIN — RIFAXIMIN 550 MG: 550 TABLET ORAL at 08:20

## 2019-12-25 RX ADMIN — Medication 10 ML: at 08:31

## 2019-12-25 RX ADMIN — LACTULOSE: 10 SOLUTION ORAL at 23:10

## 2019-12-25 ASSESSMENT — PAIN SCALES - GENERAL
PAINLEVEL_OUTOF10: 0
PAINLEVEL_OUTOF10: 0

## 2019-12-25 ASSESSMENT — PAIN SCALES - WONG BAKER: WONGBAKER_NUMERICALRESPONSE: 0

## 2019-12-25 NOTE — PLAN OF CARE
Problem: Falls - Risk of:  Goal: Will remain free from falls  Description  Will remain free from falls  12/24/2019 2241 by Andreia Gutierrez RN  Outcome: Ongoing  12/24/2019 0857 by Rebecca Castro RN  Outcome: Ongoing  Goal: Absence of physical injury  Description  Absence of physical injury  12/24/2019 2241 by Andreia Gutierrez RN  Outcome: Ongoing  12/24/2019 0857 by Rebecca Castro RN  Outcome: Ongoing     Problem: Risk for Impaired Skin Integrity  Goal: Tissue integrity - skin and mucous membranes  Description  Structural intactness and normal physiological function of skin and  mucous membranes.   12/24/2019 2241 by Andreia Gutierrez RN  Outcome: Ongoing  12/24/2019 0857 by Rebecca Castro RN  Outcome: Ongoing     Problem: Sensory:  Goal: General experience of comfort will improve  Description  General experience of comfort will improve  12/24/2019 2241 by Andreia Gutierrez RN  Outcome: Ongoing  12/24/2019 0857 by Rebecca Castro RN  Outcome: Ongoing     Problem: Urinary Elimination:  Goal: Signs and symptoms of infection will decrease  Description  Signs and symptoms of infection will decrease  12/24/2019 2241 by Andreia Gutierrez RN  Outcome: Ongoing  12/24/2019 0857 by Rebecca Castro RN  Outcome: Ongoing  Goal: Ability to reestablish a normal urinary elimination pattern will improve - after catheter removal  Description  Ability to reestablish a normal urinary elimination pattern will improve  12/24/2019 2241 by Andreia Gutierrez RN  Outcome: Ongoing  12/24/2019 0857 by Rebecca Castro RN  Outcome: Ongoing  Goal: Complications related to the disease process, condition or treatment will be avoided or minimized  Description  Complications related to the disease process, condition or treatment will be avoided or minimized  12/24/2019 2241 by Andreia Gutierrez RN  Outcome: Ongoing  12/24/2019 0857 by Rebecca Castro RN  Outcome: Ongoing     Problem: SAFETY  Goal: Free from

## 2019-12-25 NOTE — PROGRESS NOTES
Patient noted with no BM today. MD notified. Awaiting for response.  Electronically signed by Deirdre Pittman RN on 12/25/2019 at 6:15 PM

## 2019-12-25 NOTE — PLAN OF CARE
Problem: Falls - Risk of:  Goal: Will remain free from falls  Description  Will remain free from falls  12/25/2019 1002 by Jason Bruner RN  Outcome: Ongoing  12/24/2019 2241 by Yunior Roach RN  Outcome: Ongoing  Goal: Absence of physical injury  Description  Absence of physical injury  12/25/2019 1002 by Jason Bruner RN  Outcome: Ongoing  12/24/2019 2241 by Yunior Roach RN  Outcome: Ongoing     Problem: Sensory:  Goal: General experience of comfort will improve  Description  General experience of comfort will improve  12/25/2019 1002 by Jason Bruner RN  Outcome: Ongoing  12/24/2019 2241 by Yunior Roach RN  Outcome: Ongoing     Problem: Urinary Elimination:  Goal: Signs and symptoms of infection will decrease  Description  Signs and symptoms of infection will decrease  12/25/2019 1002 by Jason Bruner RN  Outcome: Ongoing  12/24/2019 2241 by Yunior Roach RN  Outcome: Ongoing  Goal: Ability to reestablish a normal urinary elimination pattern will improve - after catheter removal  Description  Ability to reestablish a normal urinary elimination pattern will improve  12/25/2019 1002 by Jason Bruner RN  Outcome: Ongoing  12/24/2019 2241 by Yunior Roach RN  Outcome: Ongoing  Goal: Complications related to the disease process, condition or treatment will be avoided or minimized  Description  Complications related to the disease process, condition or treatment will be avoided or minimized  12/25/2019 1002 by Jason Bruner RN  Outcome: Ongoing  12/24/2019 2241 by Yunior Roach RN  Outcome: Ongoing     Problem: SAFETY  Goal: Free from accidental physical injury  12/25/2019 1002 by Jason Bruner RN  Outcome: Ongoing  12/24/2019 2241 by Yunior Roach RN  Outcome: Ongoing     Problem: DAILY CARE  Goal: Daily care needs are met  12/25/2019 1002 by Jason Bruner RN  Outcome: Ongoing  12/24/2019 2241 by Yunior Roach RN  Outcome: Ongoing     Problem: PAIN  Goal: Patient's pain/discomfort is

## 2019-12-25 NOTE — PROGRESS NOTES
Patient's K+ is 2.8. MD notified. PRN order initiated.  Electronically signed by Daniella Newberry RN on 12/25/2019 at 8:09 AM

## 2019-12-25 NOTE — PROGRESS NOTES
12/23/2019    GLUCOSEU Negative 12/23/2019    GLUCOSEU NEGATIVE 06/27/2011       Radiology:  XR CHEST PORTABLE   Final Result   No significant findings in the chest.                   Racheal Díaz MD

## 2019-12-26 ENCOUNTER — APPOINTMENT (OUTPATIENT)
Dept: ULTRASOUND IMAGING | Age: 69
DRG: 433 | End: 2019-12-26
Payer: MEDICARE

## 2019-12-26 LAB
A/G RATIO: 0.6 (ref 1.1–2.2)
ALBUMIN SERPL-MCNC: 2.2 G/DL (ref 3.4–5)
ALP BLD-CCNC: 105 U/L (ref 40–129)
ALT SERPL-CCNC: 17 U/L (ref 10–40)
AMMONIA: 48 UMOL/L (ref 16–60)
ANION GAP SERPL CALCULATED.3IONS-SCNC: 14 MMOL/L (ref 3–16)
AST SERPL-CCNC: 39 U/L (ref 15–37)
BILIRUB SERPL-MCNC: 1.8 MG/DL (ref 0–1)
BUN BLDV-MCNC: 27 MG/DL (ref 7–20)
CALCIUM SERPL-MCNC: 9.4 MG/DL (ref 8.3–10.6)
CHLORIDE BLD-SCNC: 106 MMOL/L (ref 99–110)
CO2: 20 MMOL/L (ref 21–32)
CREAT SERPL-MCNC: 2 MG/DL (ref 0.8–1.3)
CREATININE URINE: 46.9 MG/DL (ref 39–259)
GFR AFRICAN AMERICAN: 40
GFR NON-AFRICAN AMERICAN: 33
GLOBULIN: 3.7 G/DL
GLUCOSE BLD-MCNC: 119 MG/DL (ref 70–99)
OSMOLALITY URINE: 312 MOSM/KG (ref 390–1070)
POTASSIUM SERPL-SCNC: 3.2 MMOL/L (ref 3.5–5.1)
SODIUM BLD-SCNC: 140 MMOL/L (ref 136–145)
SODIUM URINE: 68 MMOL/L
SODIUM URINE: 93 MMOL/L
TOTAL PROTEIN: 5.9 G/DL (ref 6.4–8.2)
UREA NITROGEN, UR: 171.7 MG/DL (ref 800–1666)

## 2019-12-26 PROCEDURE — 6360000002 HC RX W HCPCS: Performed by: INTERNAL MEDICINE

## 2019-12-26 PROCEDURE — 6370000000 HC RX 637 (ALT 250 FOR IP): Performed by: HOSPITALIST

## 2019-12-26 PROCEDURE — 82570 ASSAY OF URINE CREATININE: CPT

## 2019-12-26 PROCEDURE — 84300 ASSAY OF URINE SODIUM: CPT

## 2019-12-26 PROCEDURE — 84540 ASSAY OF URINE/UREA-N: CPT

## 2019-12-26 PROCEDURE — 97116 GAIT TRAINING THERAPY: CPT

## 2019-12-26 PROCEDURE — 97162 PT EVAL MOD COMPLEX 30 MIN: CPT

## 2019-12-26 PROCEDURE — 2580000003 HC RX 258: Performed by: HOSPITALIST

## 2019-12-26 PROCEDURE — 83935 ASSAY OF URINE OSMOLALITY: CPT

## 2019-12-26 PROCEDURE — 82140 ASSAY OF AMMONIA: CPT

## 2019-12-26 PROCEDURE — 76770 US EXAM ABDO BACK WALL COMP: CPT

## 2019-12-26 PROCEDURE — 2580000003 HC RX 258: Performed by: INTERNAL MEDICINE

## 2019-12-26 PROCEDURE — 80053 COMPREHEN METABOLIC PANEL: CPT

## 2019-12-26 PROCEDURE — 97530 THERAPEUTIC ACTIVITIES: CPT

## 2019-12-26 PROCEDURE — 36415 COLL VENOUS BLD VENIPUNCTURE: CPT

## 2019-12-26 PROCEDURE — 1200000000 HC SEMI PRIVATE

## 2019-12-26 RX ORDER — SODIUM CHLORIDE 9 MG/ML
INJECTION, SOLUTION INTRAVENOUS CONTINUOUS
Status: DISCONTINUED | OUTPATIENT
Start: 2019-12-26 | End: 2019-12-26

## 2019-12-26 RX ORDER — LACTULOSE 10 G/15ML
20 SOLUTION ORAL 3 TIMES DAILY
Status: DISCONTINUED | OUTPATIENT
Start: 2019-12-26 | End: 2019-12-28 | Stop reason: HOSPADM

## 2019-12-26 RX ORDER — SODIUM CHLORIDE AND POTASSIUM CHLORIDE .9; .15 G/100ML; G/100ML
SOLUTION INTRAVENOUS CONTINUOUS
Status: DISPENSED | OUTPATIENT
Start: 2019-12-26 | End: 2019-12-27

## 2019-12-26 RX ADMIN — Medication 10 MEQ: at 12:31

## 2019-12-26 RX ADMIN — RIFAXIMIN 550 MG: 550 TABLET ORAL at 20:21

## 2019-12-26 RX ADMIN — RIFAXIMIN 550 MG: 550 TABLET ORAL at 08:17

## 2019-12-26 RX ADMIN — PANTOPRAZOLE SODIUM 40 MG: 40 TABLET, DELAYED RELEASE ORAL at 06:16

## 2019-12-26 RX ADMIN — Medication 10 ML: at 08:18

## 2019-12-26 RX ADMIN — MIDODRINE HYDROCHLORIDE 5 MG: 5 TABLET ORAL at 17:01

## 2019-12-26 RX ADMIN — Medication 10 MEQ: at 11:02

## 2019-12-26 RX ADMIN — CEFEPIME HYDROCHLORIDE 2 G: 2 INJECTION, POWDER, FOR SOLUTION INTRAVENOUS at 03:56

## 2019-12-26 RX ADMIN — Medication 10 MEQ: at 09:38

## 2019-12-26 RX ADMIN — RIFAXIMIN 550 MG: 550 TABLET ORAL at 13:22

## 2019-12-26 RX ADMIN — LACTULOSE 20 G: 20 SOLUTION ORAL at 20:21

## 2019-12-26 RX ADMIN — AMIODARONE HYDROCHLORIDE 200 MG: 200 TABLET ORAL at 08:17

## 2019-12-26 RX ADMIN — TAMSULOSIN HYDROCHLORIDE 0.4 MG: 0.4 CAPSULE ORAL at 08:17

## 2019-12-26 RX ADMIN — LACTULOSE 20 G: 20 SOLUTION ORAL at 13:22

## 2019-12-26 RX ADMIN — MIDODRINE HYDROCHLORIDE 5 MG: 5 TABLET ORAL at 08:17

## 2019-12-26 RX ADMIN — LACTULOSE: 10 SOLUTION ORAL at 09:11

## 2019-12-26 RX ADMIN — MIDODRINE HYDROCHLORIDE 5 MG: 5 TABLET ORAL at 12:31

## 2019-12-26 RX ADMIN — Medication 10 ML: at 20:22

## 2019-12-26 RX ADMIN — POTASSIUM CHLORIDE AND SODIUM CHLORIDE: 900; 150 INJECTION, SOLUTION INTRAVENOUS at 16:27

## 2019-12-26 RX ADMIN — Medication 10 MEQ: at 08:17

## 2019-12-26 RX ADMIN — DULOXETINE HYDROCHLORIDE 20 MG: 20 CAPSULE, DELAYED RELEASE ORAL at 08:17

## 2019-12-26 ASSESSMENT — PAIN SCALES - GENERAL: PAINLEVEL_OUTOF10: 0

## 2019-12-26 NOTE — PROGRESS NOTES
Perfect serve message sent to Arlette Ozuna NP Hospitalist on call due to pts BP= 87/57. Pt asymptomatic. Chart to bed reviewed. No new orders at this time. Will continue to monitor.

## 2019-12-26 NOTE — PLAN OF CARE
Problem: Falls - Risk of:  Goal: Will remain free from falls  Description  Will remain free from falls  Outcome: Ongoing  Goal: Absence of physical injury  Description  Absence of physical injury  Outcome: Ongoing      Problem: Urinary Elimination:  Goal: Signs and symptoms of infection will decrease  Description  Signs and symptoms of infection will decrease  Outcome: Ongoing  Goal: Ability to reestablish a normal urinary elimination pattern will improve - after catheter removal  Description  Ability to reestablish a normal urinary elimination pattern will improve  Outcome: Ongoing  Goal: Complications related to the disease process, condition or treatment will be avoided or minimized  Description  Complications related to the disease process, condition or treatment will be avoided or minimized  Outcome: Ongoing        Problem: PAIN  Goal: Patient's pain/discomfort is manageable  Outcome: Ongoing     Problem: SKIN INTEGRITY  Goal: Skin integrity is maintained or improved  Outcome: Ongoing      Problem: Skin Integrity:  Goal: Will show no infection signs and symptoms  Description  Will show no infection signs and symptoms  Outcome: Ongoing  Goal: Absence of new skin breakdown  Description  Absence of new skin breakdown  Outcome: Ongoing     Problem: Fluid Volume:  Goal: Ability to achieve a balanced intake and output will improve  Description  Ability to achieve a balanced intake and output will improve  Outcome: Ongoing     Problem: Physical Regulation:  Goal: Ability to maintain clinical measurements within normal limits will improve  Description  Ability to maintain clinical measurements within normal limits will improve  Outcome: Ongoing  Goal: Will show no signs and symptoms of electrolyte imbalance  Description  Will show no signs and symptoms of electrolyte imbalance  Outcome: Ongoing     Problem:  Activity:  Goal: Ability to tolerate increased activity will improve  Description  Ability to tolerate increased activity will improve  Outcome: Ongoing

## 2019-12-26 NOTE — PLAN OF CARE
Problem: Falls - Risk of:  Goal: Will remain free from falls  Description  Will remain free from falls  12/26/2019 0854 by Romina Reyna RN  Outcome: Ongoing  12/25/2019 2243 by Kamila Jones RN  Outcome: Ongoing  Goal: Absence of physical injury  Description  Absence of physical injury  12/26/2019 0854 by Romina Reyna RN  Outcome: Ongoing  12/25/2019 2243 by Kamila Jones RN  Outcome: Ongoing   Pt free from falls this shift. Fall precautions in place at all times. Call light within reach. Pt able to and agreeable to contact for safety appropriately. Problem: Risk for Impaired Skin Integrity  Goal: Tissue integrity - skin and mucous membranes  Description  Structural intactness and normal physiological function of skin and  mucous membranes. Outcome: Ongoing   Skin assessment completed every shift. Pt assessed for incontinence, appropriate barrier cream applied prn as needed. Pt encouraged to turn/rotate every 2 hours. Assistance provided if pt unable to do so themselves. Problem: SAFETY  Goal: Free from accidental physical injury  Outcome: Ongoing   Pt understands and knows to call when in need of ambulation. Measures were made to prevent accidental needle stick, friction, shear, etc. Environment kept free of clutter and adequate lighting provided. Will continue to monitor for physical injury. Problem: PAIN  Goal: Patient's pain/discomfort is manageable  Outcome: Ongoing  Pain/discomfort being managed with PRN analgesics per MD order. Pt able to express presence and absence of pain and rate pain appropriately using numerical scale    Problem: SKIN INTEGRITY  Goal: Skin integrity is maintained or improved  Outcome: Ongoing   Skin assessment completed every shift. Pt assessed for incontinence, appropriate barrier cream applied prn as needed. Pt encouraged to turn/rotate every 2 hours. Assistance provided if pt unable to do so themselves.      Problem: Physical Regulation:  Goal: Ability to maintain clinical measurements within normal limits will improve  Description  Ability to maintain clinical measurements within normal limits will improve  12/26/2019 0854 by Harsh Metzger RN  Outcome: Ongoing  12/25/2019 2243 by Nirmala Zee RN  Outcome: Ongoing  Goal: Will show no signs and symptoms of electrolyte imbalance  Description  Will show no signs and symptoms of electrolyte imbalance  12/26/2019 0854 by Harsh Metzger RN  Outcome: Ongoing  12/25/2019 2243 by Nirmala Zee RN  Outcome: Ongoing   Assist with transfers and bed mobility. Encourage therapy as ordered.

## 2019-12-26 NOTE — PROGRESS NOTES
Hospitalist Progress Note      PCP: MOI MOURA    Date of Admission: 12/23/2019    Chief Complaint: CONFUSION     Subjective: awake, lethargic speech  Mother at [address] [place] patient is a 52-year-old male with history of alcoholic liver cirrhosis and atrial fibrillation who was brought to the ER with worsening mentation. Ammonia elevated  Rectal lactulose given, cefepime started for cystitis,   Mentation improved, switched to PO lactulose and rifaximin     Vitals signs:  Afebrile    Medications:  Lactuloase, cymbalta, midodrine, nadolol,     Antibiotics: cefepime     Labs:     Na 138, K 2.8, Cl 100, HCO 23, Cr 1.8   Musa 2.3     UA positive for WBCs and moderate leukocyte esterase    Imaging:   CXR negative for acute problems     Assessment/Plan:     Acute metabolic encephalopathy secondary to hepatic encephalopathy:   Sec to UTI vs dehydration   NS bolus, cefepime for UTI  Hold torsemide,   Mentation improved  Avoid BZD and sedatives   scheduled lactulose and rifaximin     Liver cirrhosis: elevated bilirubin,   Chronic   Continue nadolol, protonix   Midodrine for hypotension     JAIME: creatinine 1.7,   Torsemide held  NS bolus  Recheck later today       Acute cystitis without hematuria- complicated, hx of suprapubic catheter   Cefepime  Culture pending     History of atrial fibrillation: amiodarone     Hypokalemia: torsemide held  Replace PRN     DVT prophlaxis:   teds/scds    Last BM:   12/24    Lines/Will:   PERIPHERAL IV    Ambulation:   PT    Disposition:   Home in 1-2 days     Physical Exam Performed:       BP 94/67   Pulse 62   Temp 97 °F (36.1 °C) (Oral)   Resp 16   Ht 6' (1.829 m)   Wt 165 lb 12.6 oz (75.2 kg)   SpO2 96%   BMI 22.48 kg/m²     Physical Exam  Constitutional:       General: He is not in acute distress. Appearance: Normal appearance. HENT:      Head: Normocephalic and atraumatic.       Right Ear: External ear normal.      Left Ear: External ear normal.      Mouth/Throat:      Comments: Tongue coated white,   Eyes:      Extraocular Movements: Extraocular movements intact. Neck:      Musculoskeletal: Normal range of motion. Cardiovascular:      Rate and Rhythm: Normal rate and regular rhythm. Heart sounds: No murmur. Pulmonary:      Effort: Pulmonary effort is normal. No respiratory distress. Breath sounds: Normal breath sounds. No wheezing. Abdominal:      General: Bowel sounds are normal. There is no distension. Palpations: Abdomen is soft. Tenderness: There is no tenderness. Musculoskeletal:         General: No swelling. Skin:     General: Skin is warm. Neurological:      Mental Status: He is alert. Cranial Nerves: No cranial nerve deficit. Comments: Babbles at times, incoherent  Confused    Psychiatric:         Mood and Affect: Mood normal.       Exam unchanged from admission except as above     Labs:   Recent Labs     12/23/19  1904   WBC 6.4   HGB 14.0   HCT 41.9   PLT 82*     Recent Labs     12/25/19  0617 12/25/19  1634 12/26/19  0618    134* 140   K 2.8* 3.1* 3.2*    100 106   CO2 23 22 20*   BUN 31* 29* 27*   CREATININE 1.8* 1.9* 2.0*   CALCIUM 9.3 9.0 9.4     Recent Labs     12/23/19  1904 12/25/19  0617 12/26/19  0618   AST 45* 35 39*   ALT 20 15 17   BILITOT 3.2* 2.3* 1.8*   ALKPHOS 136* 96 105     Recent Labs     12/23/19  1904   INR 1.30*     No results for input(s): CKTOTAL, TROPONINI in the last 72 hours.     Urinalysis:      Lab Results   Component Value Date    NITRU Negative 12/23/2019    WBCUA 203 12/23/2019    BACTERIA 3+ 12/23/2019    RBCUA 0 12/23/2019    BLOODU SMALL 12/23/2019    SPECGRAV 1.025 12/23/2019    GLUCOSEU Negative 12/23/2019    GLUCOSEU NEGATIVE 06/27/2011       Radiology:  XR CHEST PORTABLE   Final Result   No significant findings in the chest.                   Sherice Tesfaye MD

## 2019-12-26 NOTE — CONSULTS
Nephrology ([facility]) Consult Note    Maximus Fang is a 77 y.o. male whom we were asked to see for JAIME. Patient is in hospice. He presents with altered mental status. He was believed to have hepatic encephalopathy with NH3 202 with UTI. Apparently, the wife is insisting that he be full code and aggressive treatment. Patient is confused and unable to provide a history. Was given IVF yesterday. 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 Social History:  no tobacco  Family Medical History:  Negative for kidney disase    Physical Exam:  Blood pressure (!) 102/59, pulse 63, temperature 98.2 °F (36.8 °C), temperature source Oral, resp. rate 18, height 6' (1.829 m), weight 165 lb 12.6 oz (75.2 kg), SpO2 95 %.     General:  NAD, confused, ill-appearing, frail body habitus  HEENT:  PERRL, EOMI  Neck:  Supple, normal range of movement  Chest:  CTAB, good respiratory effort, good air movement  CV:  RRR, no murmurs or rubs, no carotid bruit, no abdominal bruits  Abdomen:  NTND, soft, +BS, no hepatosplenomegaly, suprapubic catheter  Extremities:  No peripheral edema  Neurological:  Moving all four extremities  Lymphatics:  No palpable lymph nodes  Skin:  No rash, no jaundice  Psychiatric:  N/A    Laboratory Studies:  Lab Results   Component Value Date/Time     12/26/2019 06:18 AM    K 3.2 (L) 12/26/2019 06:18 AM    K 2.9 (LL) 11/26/2019 06:46 AM     12/26/2019 06:18 AM    CO2 20 (L) 12/26/2019 06:18 AM    BUN 27 (H) 12/26/2019 06:18 AM    CREATININE 2.0 (H) 12/26/2019 06:18 AM    CALCIUM 9.4 12/26/2019 06:18 AM    PHOS 2.5 09/29/2019 06:41 AM    MG 1.90 12/25/2019 04:34 PM     Lab Results   Component Value Date/Time    WBC 6.4 12/23/2019 07:04 PM    HGB 14.0 12/23/2019 07:04 PM    HCT 41.9 12/23/2019 07:04 PM    PLT 82 (L) 12/23/2019 07:04 PM       Assessment/Plan:  Reviewed old records and labs.     1) JAIME   - baseline 12/04/19 Cr 0.8   - likely ATN vs. pre-renal   - check renal ultrasound    - check urine studies   - he is not a dialysis candidate as he is in \"hospice\" the endstage liver disease   - will give trial of IVF and hold torsemide as I don't know his PO intake    2) EtOH cirrhosis   - end-stage    3) hepatic encephalopathy   - on rifaximin and lactulose   - still confused    4) FEN   - hypokalemia    - supplement

## 2019-12-26 NOTE — PROGRESS NOTES
Physical Therapy     Initial Assessment / Treatment Note    Room Number: T2L-4447/4252-01  NAME: Selwyn Carpenter  : Medical Record Number: [id number]  MRN: [id number]    ASSESSMENT: Kalen Estrada is a 77 y.o. M admit 19 \"with history of alcoholic liver cirrhosis and atrial fibrillation who was brought to the ER with worsening mentation\" per Dr. Yue Littlejohn note  -- pt has been in hospice care at [facility] (respite) with plans to return home after this hospital admission. Prior to admit pt was in [facility] under respite care and requiring assistance for short distance of ambulation and for toileting. Today, he transferred with CGA and ambulated short distances in room behind the [employer] lift. He appears to be functioning close to his baseline. If patient continues with hospitce services then he will not require any further skilled PT; however, if he returns home without hospice then he would benefit from level 1 home PT. Will follow in the hospital.    Discharge Recommendations: Continue to assess pending progress(return home/facility with hospice services and no PT; level 1 home PT if patient returns home without hospice services)  Equipment Needs:      Date of Service: 19       Patient Diagnosis(es): The primary encounter diagnosis was Hepatic encephalopathy ([address] Diagnoses of Urinary tract infection without hematuria, site unspecified and Septicemia ([address] were also pertinent to this visit.   Past Medical History:  has a past medical history of Abnormality of gait, Alcohol dependence ([address] Alcoholic cirrhosis ([address] Anxiety, Arthritis, Basal cell carcinoma, Cerebral artery occlusion with cerebral infarction ([address] Circulation problem, Dementia ([address] Depressive disorder, GERD (gastroesophageal reflux disease), Hypomagnesemia, Hypopotassemia, MDRO (multiple drug resistant organisms) resistance, SOB (shortness of breath), Spinal stenosis, Suicidal behavior, Syncope, Thrombocytopenia ([address] Traumatic brain walker  ADL Assistance: Needs assistance  Homemaking Responsibilities: No(Wife cooks, launders and cleans)  Ambulation Assistance: Independent(with rollator walker)  Transfer Assistance: Independent  Active : No  Occupation: Retired  Additional Comments: Just prior to this admission pt was in hospice respite care at [place] but will be returning home at d/c. May no longer qualify for hospice services. OBJECTIVE:  ROM  RLE PROM: WFL     LLE PROM: WFL       STRENGTH  Strength RLE: Exception(not formally examined but appears weak in hip/knee extension with transfers and gait)     Strength LLE: Exception(not formally examined but appears weak in hip/knee extension with transfers and gait)       Bed mobility  Supine to Sit: Stand by assistance(HOB flat, use of bed rail on R)  Sit to Supine: Minimal assistance(assist for leg in bed, use of bed rail on L, HOB flat)    Transfers  Sit to Stand: Contact guard assistance(from bed to [employer] multiple times)  Stand to sit: Contact guard assistance(from SaraStedy to bed multiple times)    Ambulation  Ambulation  Ambulation?: Yes  Ambulation 1  Device: (ambulating behind SaraStedy)  Assistance: Contact guard assistance  Quality of Gait: narrow base of support, reduced foot clearance, steady but slow gait with head down, slightly flexed at hips/knees  Distance: 1 x 15', 1 x 3'    Stairs/Curb  Stairs/Curb  Stairs?: No     Balance  Balance  Sitting - Static: Good  Standing - Static: Good      Treatment included transfer training, gait training, and patient education. This note also serves as a D/C Summary in the event that this patient is discharged prior to the next therapy session. Please refer to last PT note for goal status, discharge recommendations and functional status.        ASSESSMENT:   Assessment: Trang Flores is a 77 y.o. M admit 12/23/19 \"with history of alcoholic liver cirrhosis and atrial fibrillation who was brought to the ER with worsening mentation\" per Dr. Dede Salinas note 12/25 -- pt has been in hospice care at [facility] (respite) with plans to return home after this hospital admission. Prior to admit pt was in [facility] under respite care and requiring assistance for short distance of ambulation and for toileting. Today, he transferred with CGA and ambulated short distances in room behind the [place] lift. He appears to be functioning close to his baseline. If patient continues with hospitce services then he will not require any further skilled PT; however, if he returns home without hospice then he would benefit from level 1 home PT. Will follow in the hospital.  Treatment Diagnosis: Decreased activity tolerance, abnormal gait, decreased safety insight  Prognosis: Good  Decision Making: Medium Complexity  History: Abnormality of gait, Alcohol dependence (HCC), Alcoholic cirrhosis (HCC), Anxiety, Arthritis, Basal cell carcinoma, Cerebral artery occlusion with cerebral infarction ([address] (20 YEARS AGO), Circulation problem, Dementia ([address] Depressive disorder, GERD (gastroesophageal reflux disease), Hypomagnesemia, Hypopotassemia, MDRO (multiple drug resistant organisms) resistance (07/02/2019), SOB (shortness of breath), Spinal stenosis, Suicidal behavior, Syncope, Thrombocytopenia (HCC), Traumatic brain injury ([address] (2000), VRE (vancomycin resistant enterococcus) culture positive (07/20/2019), Wears dentures, Wears glasses, Wheezing. Exam: Decreased activity tolerance, abnormal gait, decreased safety insight  Clinical Presentation: Evolving activity tolerance  Barriers to Learning: Cog  REQUIRES PT FOLLOW UP: Yes  Discharge Recommendations: Continue to assess pending progress(return home/facility with hospice services and no PT; level 1 home PT if patient returns home without hospice services)    Steven Corley scored a 17/24 on the AM-PAC short mobility form.  Initial research suggests that an AM-PAC score of 18 or greater may be associated with a discharge to patient's home setting. However in this initial research, cut off scores do not perfectly predict discharge location: 25% of patients with a score of 18 or greater actually went to a rehab facility and 20% of people with a score less than 18 actually went home. Based on my clinical judgement I recommend that the patient have no Physical Therapy at d/c as patient is at baseline. Safety devices:   Type of devices:  All fall risk precautions in place, Call light within reach, Bed alarm in place, Patient at risk for falls, Nurse notified, Left in bed        PLAN:  Times per week: 3-5x/wk while in the hospital;    Current Treatment Recommendations: Functional Mobility Training     OutComes Score  How much difficulty turning over in bed?: None  How much difficulty sitting down on / standing up from a chair with arms?: A Little  How much difficulty moving from lying on back to sitting on side of bed?: A Little  How much help from another person moving to and from a bed to a chair?: A Little  How much help from another person needed to walk in hospital room?: A Little  How much help from another person for climbing 3-5 steps with a railing?: Total  AM-PAC Inpatient Mobility Raw Score : 17  AM-PAC Inpatient T-Scale Score : 42.13  Mobility Inpatient CMS 0-100% Score: 50.57  Mobility Inpatient CMS G-Code Modifier : CK       Goals  Patient Goals   Patient goals : Pt unsure of what his discharge plan is -- hoping to be able to be taken care of wherever he goes  Time Frame for Short term goals: upon d/c  Short term goal 1: sup<>sit SBA   Short term goal 2: sit<>stand SBA   Short term goal 3: amb 21' with a walker and CGA              Therapy Time    Individual Concurrent Group Co-treatment   Time In 1009            Time Out 1100          Minutes 51             Timed Code Treatment Minutes: 30 Minutes      Leonor Pyle, PT

## 2019-12-27 LAB
A/G RATIO: 0.7 (ref 1.1–2.2)
ALBUMIN SERPL-MCNC: 2.5 G/DL (ref 3.4–5)
ALP BLD-CCNC: 115 U/L (ref 40–129)
ALT SERPL-CCNC: 21 U/L (ref 10–40)
AMMONIA: 36 UMOL/L (ref 16–60)
ANION GAP SERPL CALCULATED.3IONS-SCNC: 15 MMOL/L (ref 3–16)
AST SERPL-CCNC: 52 U/L (ref 15–37)
BILIRUB SERPL-MCNC: 1.8 MG/DL (ref 0–1)
BLOOD CULTURE, ROUTINE: NORMAL
BUN BLDV-MCNC: 25 MG/DL (ref 7–20)
CALCIUM SERPL-MCNC: 9.4 MG/DL (ref 8.3–10.6)
CHLORIDE BLD-SCNC: 107 MMOL/L (ref 99–110)
CO2: 14 MMOL/L (ref 21–32)
CREAT SERPL-MCNC: 1.7 MG/DL (ref 0.8–1.3)
CULTURE, BLOOD 2: NORMAL
GFR AFRICAN AMERICAN: 49
GFR NON-AFRICAN AMERICAN: 40
GLOBULIN: 3.7 G/DL
GLUCOSE BLD-MCNC: 93 MG/DL (ref 70–99)
POTASSIUM SERPL-SCNC: 3 MMOL/L (ref 3.5–5.1)
SODIUM BLD-SCNC: 136 MMOL/L (ref 136–145)
TOTAL PROTEIN: 6.2 G/DL (ref 6.4–8.2)

## 2019-12-27 PROCEDURE — 82140 ASSAY OF AMMONIA: CPT

## 2019-12-27 PROCEDURE — 80053 COMPREHEN METABOLIC PANEL: CPT

## 2019-12-27 PROCEDURE — 97116 GAIT TRAINING THERAPY: CPT

## 2019-12-27 PROCEDURE — 6370000000 HC RX 637 (ALT 250 FOR IP): Performed by: HOSPITALIST

## 2019-12-27 PROCEDURE — 36415 COLL VENOUS BLD VENIPUNCTURE: CPT

## 2019-12-27 PROCEDURE — 2580000003 HC RX 258: Performed by: INTERNAL MEDICINE

## 2019-12-27 PROCEDURE — 6360000002 HC RX W HCPCS: Performed by: INTERNAL MEDICINE

## 2019-12-27 PROCEDURE — 1200000000 HC SEMI PRIVATE

## 2019-12-27 PROCEDURE — 97530 THERAPEUTIC ACTIVITIES: CPT

## 2019-12-27 RX ORDER — POTASSIUM CHLORIDE 750 MG/1
40 TABLET, FILM COATED, EXTENDED RELEASE ORAL EVERY 4 HOURS
Status: COMPLETED | OUTPATIENT
Start: 2019-12-27 | End: 2019-12-27

## 2019-12-27 RX ADMIN — LACTULOSE 20 G: 20 SOLUTION ORAL at 22:35

## 2019-12-27 RX ADMIN — TAMSULOSIN HYDROCHLORIDE 0.4 MG: 0.4 CAPSULE ORAL at 09:58

## 2019-12-27 RX ADMIN — MIDODRINE HYDROCHLORIDE 5 MG: 5 TABLET ORAL at 13:58

## 2019-12-27 RX ADMIN — RIFAXIMIN 550 MG: 550 TABLET ORAL at 13:56

## 2019-12-27 RX ADMIN — RIFAXIMIN 550 MG: 550 TABLET ORAL at 22:34

## 2019-12-27 RX ADMIN — MIDODRINE HYDROCHLORIDE 5 MG: 5 TABLET ORAL at 17:58

## 2019-12-27 RX ADMIN — POTASSIUM CHLORIDE 40 MEQ: 750 TABLET, FILM COATED, EXTENDED RELEASE ORAL at 13:56

## 2019-12-27 RX ADMIN — POTASSIUM CHLORIDE AND SODIUM CHLORIDE: 900; 150 INJECTION, SOLUTION INTRAVENOUS at 13:56

## 2019-12-27 RX ADMIN — LACTULOSE 20 G: 20 SOLUTION ORAL at 13:56

## 2019-12-27 RX ADMIN — LACTULOSE 20 G: 20 SOLUTION ORAL at 09:59

## 2019-12-27 RX ADMIN — MIDODRINE HYDROCHLORIDE 5 MG: 5 TABLET ORAL at 09:58

## 2019-12-27 RX ADMIN — POTASSIUM CHLORIDE 40 MEQ: 750 TABLET, FILM COATED, EXTENDED RELEASE ORAL at 10:56

## 2019-12-27 RX ADMIN — AMIODARONE HYDROCHLORIDE 200 MG: 200 TABLET ORAL at 09:58

## 2019-12-27 RX ADMIN — Medication 10 ML: at 22:35

## 2019-12-27 RX ADMIN — DULOXETINE HYDROCHLORIDE 20 MG: 20 CAPSULE, DELAYED RELEASE ORAL at 09:59

## 2019-12-27 RX ADMIN — PANTOPRAZOLE SODIUM 40 MG: 40 TABLET, DELAYED RELEASE ORAL at 06:33

## 2019-12-27 RX ADMIN — RIFAXIMIN 550 MG: 550 TABLET ORAL at 09:58

## 2019-12-27 ASSESSMENT — PAIN SCALES - GENERAL: PAINLEVEL_OUTOF10: 0

## 2019-12-27 ASSESSMENT — PAIN SCALES - WONG BAKER
WONGBAKER_NUMERICALRESPONSE: 0

## 2019-12-27 NOTE — PROGRESS NOTES
Nutrition Assessment    Type and Reason for Visit: Reassess    Nutrition Recommendations:   Continue dysphagia soft and bite sized. Continue Ensure TID. Nutrition Assessment: Pt remains nutritionally compromised d/t poor PO itnakes <25%. Pt continues with confusion. Wts fluctuating. Just started supplement this AM. Will monitor acceptance. Malnutrition Assessment:  · Malnutrition Status: At risk for malnutrition  · Context: Chronic illness  · Findings of the 6 clinical characteristics of malnutrition (Minimum of 2 out of 6 clinical characteristics is required to make the diagnosis of moderate or severe Protein Calorie Malnutrition based on AND/ASPEN Guidelines):  1. Energy Intake-Less than or equal to 50% of estimated energy requirement, Greater than or equal to 5 days    2. Weight Loss-Unable to assess,    3. Fat Loss-Unable to assess,    4. Muscle Loss-Unable to assess,    5. Fluid Accumulation-No significant fluid accumulation,    6.   Strength-Not measured    Nutrition Risk Level: High    Nutrient Needs:  · Estimated Daily Total Kcal: 0027-6079 kcal (25-28 kcal/kg CBW)  · Estimated Daily Protein (g): 72-86 gm (1-1.2gm/kg CBW)  · Estimated Daily Total Fluid (ml/day): 1 mL/kcal    Nutrition Diagnosis:   · Problem: Inadequate oral intake  · Etiology: related to Insufficient energy/nutrient consumption     Signs and symptoms:  as evidenced by Diet history of poor intake, Weight loss greater than or equal to 5% in 1 month    Objective Information:  · Wound Type: None  · Current Nutrition Therapies:  · Oral Diet Orders: Dysphagia  Soft and Bite-Sized (Dysphagia 3)   · Oral Diet intake: 1-25%, 26-50%  · Oral Nutrition Supplement (ONS) Orders: Standard High Calorie Oral Supplement  · ONS intake: Unable to assess  · Anthropometric Measures:  · Ht: 6' (182.9 cm)   · Current Body Wt: 169 lb (76.7 kg)  · % Weight Change:   -17% x 1 month  · Ideal Body Wt: 178 lb (80.7 kg)   · BMI Classification: BMI 18.5 - 24.9 Normal Weight    Nutrition Interventions:   Continue current diet, Continue current ONS  Continued Inpatient Monitoring    Nutrition Evaluation:   · Evaluation: Goals set   · Goals: consume 50% of meals and supplement during admission.     · Monitoring: Meal Intake, Supplement Intake, Skin Integrity, Mental Status/Confusion, Weight, Pertinent Labs      Electronically signed by Isabella De La O RD, PALLAVI on 12/27/19 at 11:14 AM    Contact Number: [phone]

## 2019-12-27 NOTE — PROGRESS NOTES
on a conversation with his sister about saving his marriage. Pt willing to get up with encouragement. Pain: Patient Currently in Pain: Denies      OBJECTIVE:    Bed mobility  Supine to Sit: Minimal assistance  Sit to Supine: Maximum assistance  Scooting: Dependent/Total    Transfers  Sit to Stand: Minimal Assistance  Stand to sit: Maximum Assistance(pt dizzy and feeling pre-syncopal)    Ambulation  Ambulation  Ambulation?: Yes  Ambulation 1  Device: (ambulating behind SaraStedy)  Assistance: Minimal assistance, Moderate assistance  Quality of Gait: narrow base of support, reduced foot clearance, steady but slow gait with head down, slightly flexed at hips/knees  Distance: 1 x 5'  Comments: pt became dizzy and needed mod assist to get back into bed    Stairs  Stairs/Curb  Stairs?: No    Neuro/balance  Balance  Sitting - Static: Good  Standing - Static: Good      Safety Devices: Type of devices: All fall risk precautions in place, Call light within reach, Bed alarm in place, Patient at risk for falls, Nurse notified, Left in bed      ASSESSMENT:   Yael Yuen is a 77 y.o. M admit 12/23/19 \"with history of alcoholic liver cirrhosis and atrial fibrillation who was brought to the ER with worsening mentation\" per Dr. Blanca Beltran note 12/25 -- pt has been in hospice care at [facility] (respite) with plans to return home after this hospital admission. Prior to admit pt was in [facility] under respite care and requiring assistance for short distance of ambulation and for toileting. Today, he transferred with Min/Mod Assist and ambulated short distances in room with a walker -- pt became pre-syncopal and required sig assist to get back into the bed. He appears to be functioning close to his baseline. If patient continues with hospice services then he will not require any further skilled PT; however, if he returns home without hospice then he would benefit from level 1 home PT.  Will follow in the hospital.    [place] scored a

## 2019-12-27 NOTE — CARE COORDINATION
Spoke with Chantal ([phone]) from [facility]. She does not feel their MD will be willing to sign on patient to services. She believes patient will be better suited to return home with home care and hospice to follow as wife is not willing to follow hospice philosophy and plans to bring him back to the hospital for treatment. Has had [facility] in the past.     Call Chantal at discharge.      Electronically signed by KEN Perez on 12/27/2019 at 12:30 PM

## 2019-12-27 NOTE — PROGRESS NOTES
GLUCOSEU Negative 12/23/2019    GLUCOSEU NEGATIVE 06/27/2011       Radiology:  US RENAL COMPLETE   Final Result   No evidence of hydronephrosis.          XR CHEST PORTABLE   Final Result   No significant findings in the chest.                   Dave Roberto MD

## 2019-12-27 NOTE — PLAN OF CARE
Problem: Falls - Risk of:  Goal: Will remain free from falls  Description  Will remain free from falls  Outcome: Ongoing  Goal: Absence of physical injury  Description  Absence of physical injury  Outcome: Ongoing  Fall risk assessed. Precautions in place. Bed low and locked with side rails up x3. Nonskid socks on when OOB. Bed/chair alarm utilized. Call light within reach. Frequent checks performed. No falls at this time. Problem: Urinary Elimination:  Goal: Signs and symptoms of infection will decrease  Description  Signs and symptoms of infection will decrease  Outcome: Ongoing  Goal: Ability to reestablish a normal urinary elimination pattern will improve - after catheter removal  Description  Ability to reestablish a normal urinary elimination pattern will improve  Outcome: Ongoing  Goal: Complications related to the disease process, condition or treatment will be avoided or minimized  Description  Complications related to the disease process, condition or treatment will be avoided or minimized  Outcome: Ongoing     Problem: DAILY CARE  Goal: Daily care needs are met  Outcome: Ongoing         Problem: Skin Integrity:  Goal: Will show no infection signs and symptoms  Description  Will show no infection signs and symptoms  Outcome: Ongoing  Goal: Absence of new skin breakdown  Description  Absence of new skin breakdown  Outcome: Ongoing  Skin assessed per protocol. Julio score obtained. Skin kept clean, dry and warm. Assisted with repositioning to reduce the risk of PUs. Specialty mattress and pillow support in place. Will continue to monitor. Problem:  Activity:  Goal: Ability to tolerate increased activity will improve  Description  Ability to tolerate increased activity will improve  Outcome: Ongoing

## 2019-12-27 NOTE — PROGRESS NOTES
Pt with increasing confusion and agitation noted. Alert to self and place only. Reorientation performed. Call placed to wife so that he is able to speak with her. Wife states that she just recently left after spending 4 hours in his room. Verbalized to pt that she is aware of where he is and that she will visit tomorrow. Remains anxious. Will continue to monitor.

## 2019-12-27 NOTE — PLAN OF CARE
Problem: Nutrition  Goal: Optimal nutrition therapy  12/27/2019 1115 by Francie Vieira RD, LD  Outcome: Ongoing  12/27/2019 1108 by Eli Plata RN  Outcome: Ongoing   Nutrition Problem: Inadequate oral intake  Intervention: Food and/or Nutrient Delivery: Continue current diet, Continue current ONS  Nutritional Goals: consume 50% of meals and supplement during admission.

## 2019-12-27 NOTE — PLAN OF CARE
Problem: Falls - Risk of:  Goal: Will remain free from falls  Description  Will remain free from falls  12/27/2019 1108 by Viri Giles RN  Outcome: Ongoing     Problem: Falls - Risk of:  Goal: Absence of physical injury  Description  Absence of physical injury  12/27/2019 1108 by Viri Giles RN  Outcome: Ongoing     Problem: Risk for Impaired Skin Integrity  Goal: Tissue integrity - skin and mucous membranes  Description  Structural intactness and normal physiological function of skin and  mucous membranes. Outcome: Ongoing     Problem: Sensory:  Goal: General experience of comfort will improve  Description  General experience of comfort will improve  12/27/2019 1108 by Viri Giles RN  Outcome: Ongoing     Problem: Urinary Elimination:  Goal: Signs and symptoms of infection will decrease  Description  Signs and symptoms of infection will decrease  12/27/2019 1108 by Viri Giles RN  Outcome: Ongoing     Problem: Urinary Elimination:  Goal: Ability to reestablish a normal urinary elimination pattern will improve - after catheter removal  Description  Ability to reestablish a normal urinary elimination pattern will improve  12/27/2019 1108 by Viri Giles RN  Outcome: Ongoing     Problem: Urinary Elimination:  Goal: Complications related to the disease process, condition or treatment will be avoided or minimized  Description  Complications related to the disease process, condition or treatment will be avoided or minimized  12/27/2019 1108 by Viri Giles RN  Outcome: Ongoing     Problem: SAFETY  Goal: Free from accidental physical injury  Outcome: Ongoing     Problem: PAIN  Goal: Patient's pain/discomfort is manageable  Outcome: Ongoing     Problem: SKIN INTEGRITY  Goal: Skin integrity is maintained or improved  Outcome: Ongoing     Problem: KNOWLEDGE DEFICIT  Goal: Patient/S.O. demonstrates understanding of disease process, treatment plan, medications, and discharge instructions.   Outcome:

## 2019-12-27 NOTE — PROGRESS NOTES
Pt lost IV access throughout the night. Multiple nurses have tried to obtain IV access but were unsuccessful. Made a call out to PICC team. Awaiting call back.

## 2019-12-28 VITALS
BODY MASS INDEX: 22.87 KG/M2 | HEART RATE: 85 BPM | WEIGHT: 168.87 LBS | RESPIRATION RATE: 18 BRPM | TEMPERATURE: 98.8 F | SYSTOLIC BLOOD PRESSURE: 114 MMHG | HEIGHT: 72 IN | DIASTOLIC BLOOD PRESSURE: 67 MMHG | OXYGEN SATURATION: 95 %

## 2019-12-28 LAB
A/G RATIO: 0.7 (ref 1.1–2.2)
ALBUMIN SERPL-MCNC: 2.7 G/DL (ref 3.4–5)
ALP BLD-CCNC: 147 U/L (ref 40–129)
ALT SERPL-CCNC: 27 U/L (ref 10–40)
AMMONIA: 58 UMOL/L (ref 16–60)
ANION GAP SERPL CALCULATED.3IONS-SCNC: 12 MMOL/L (ref 3–16)
AST SERPL-CCNC: 64 U/L (ref 15–37)
BILIRUB SERPL-MCNC: 1.6 MG/DL (ref 0–1)
BUN BLDV-MCNC: 21 MG/DL (ref 7–20)
CALCIUM SERPL-MCNC: 9.6 MG/DL (ref 8.3–10.6)
CHLORIDE BLD-SCNC: 108 MMOL/L (ref 99–110)
CO2: 19 MMOL/L (ref 21–32)
CREAT SERPL-MCNC: 1.3 MG/DL (ref 0.8–1.3)
GFR AFRICAN AMERICAN: >60
GFR NON-AFRICAN AMERICAN: 55
GLOBULIN: 3.7 G/DL
GLUCOSE BLD-MCNC: 110 MG/DL (ref 70–99)
POTASSIUM SERPL-SCNC: 3.4 MMOL/L (ref 3.5–5.1)
SODIUM BLD-SCNC: 139 MMOL/L (ref 136–145)
TOTAL PROTEIN: 6.4 G/DL (ref 6.4–8.2)

## 2019-12-28 PROCEDURE — 6370000000 HC RX 637 (ALT 250 FOR IP): Performed by: HOSPITALIST

## 2019-12-28 PROCEDURE — 2580000003 HC RX 258: Performed by: INTERNAL MEDICINE

## 2019-12-28 PROCEDURE — 80053 COMPREHEN METABOLIC PANEL: CPT

## 2019-12-28 PROCEDURE — 36415 COLL VENOUS BLD VENIPUNCTURE: CPT

## 2019-12-28 PROCEDURE — 6370000000 HC RX 637 (ALT 250 FOR IP): Performed by: INTERNAL MEDICINE

## 2019-12-28 PROCEDURE — 82140 ASSAY OF AMMONIA: CPT

## 2019-12-28 RX ORDER — POTASSIUM CHLORIDE 7.45 MG/ML
10 INJECTION INTRAVENOUS PRN
Status: DISCONTINUED | OUTPATIENT
Start: 2019-12-28 | End: 2019-12-28 | Stop reason: HOSPADM

## 2019-12-28 RX ORDER — PANTOPRAZOLE SODIUM 40 MG/1
40 TABLET, DELAYED RELEASE ORAL
Qty: 30 TABLET | Refills: 1 | Status: SHIPPED | OUTPATIENT
Start: 2019-12-29

## 2019-12-28 RX ORDER — POTASSIUM CHLORIDE 20 MEQ/1
40 TABLET, EXTENDED RELEASE ORAL PRN
Status: DISCONTINUED | OUTPATIENT
Start: 2019-12-28 | End: 2019-12-28 | Stop reason: HOSPADM

## 2019-12-28 RX ORDER — TAMSULOSIN HYDROCHLORIDE 0.4 MG/1
0.4 CAPSULE ORAL DAILY
Qty: 30 CAPSULE | Refills: 0 | Status: SHIPPED | OUTPATIENT
Start: 2019-12-29

## 2019-12-28 RX ORDER — MIDODRINE HYDROCHLORIDE 5 MG/1
5 TABLET ORAL
Qty: 90 TABLET | Refills: 0 | Status: SHIPPED | OUTPATIENT
Start: 2019-12-28

## 2019-12-28 RX ADMIN — LACTULOSE 20 G: 20 SOLUTION ORAL at 21:06

## 2019-12-28 RX ADMIN — Medication 10 ML: at 09:16

## 2019-12-28 RX ADMIN — POTASSIUM BICARBONATE 40 MEQ: 782 TABLET, EFFERVESCENT ORAL at 13:18

## 2019-12-28 RX ADMIN — MIDODRINE HYDROCHLORIDE 5 MG: 5 TABLET ORAL at 11:46

## 2019-12-28 RX ADMIN — TAMSULOSIN HYDROCHLORIDE 0.4 MG: 0.4 CAPSULE ORAL at 09:15

## 2019-12-28 RX ADMIN — RIFAXIMIN 550 MG: 550 TABLET ORAL at 09:15

## 2019-12-28 RX ADMIN — DULOXETINE HYDROCHLORIDE 20 MG: 20 CAPSULE, DELAYED RELEASE ORAL at 09:15

## 2019-12-28 RX ADMIN — MIDODRINE HYDROCHLORIDE 5 MG: 5 TABLET ORAL at 17:47

## 2019-12-28 RX ADMIN — RIFAXIMIN 550 MG: 550 TABLET ORAL at 13:18

## 2019-12-28 RX ADMIN — RIFAXIMIN 550 MG: 550 TABLET ORAL at 21:06

## 2019-12-28 RX ADMIN — LACTULOSE 20 G: 20 SOLUTION ORAL at 09:16

## 2019-12-28 RX ADMIN — LACTULOSE 20 G: 20 SOLUTION ORAL at 13:18

## 2019-12-28 RX ADMIN — AMIODARONE HYDROCHLORIDE 200 MG: 200 TABLET ORAL at 09:16

## 2019-12-28 RX ADMIN — PANTOPRAZOLE SODIUM 40 MG: 40 TABLET, DELAYED RELEASE ORAL at 09:16

## 2019-12-28 RX ADMIN — MIDODRINE HYDROCHLORIDE 5 MG: 5 TABLET ORAL at 09:16

## 2019-12-28 ASSESSMENT — PAIN SCALES - WONG BAKER
WONGBAKER_NUMERICALRESPONSE: 0

## 2019-12-28 ASSESSMENT — PAIN SCALES - GENERAL: PAINLEVEL_OUTOF10: 0

## 2019-12-28 NOTE — CARE COORDINATION
ROSEW gowned and gloved to meet with pt, wife and son at bedside. ROSEW informed them of dc for today. Wife was hoping for release tomorrow. She reports she has arranged for visitors to see him at 6pm today. LSW informed her we can arrange for later transport. Discussion held regarding home care services from home care agency vs hospice services. ROSEW informed them that Chantal informed this worker they will not be taking him back until they have a conversation about Hospice goals. JARED provided a list of home care agencies with CMS: star rating and she likes to use [facility]. The Plan for Transition of Care is related to the following treatment goals: To return home. The Patient and/or patient representative. Wife and son was provided with a choice of provider and agrees   with the discharge plan. [x] Yes [] No    Freedom of choice list was provided with basic dialogue that supports the patient's individualized plan of care/goals, treatment preferences and shares the quality data associated with the providers. [] Yes [x] No  Discussion held regarding what they might do if [facility] does not return. ROSEW provided them with list of Hospice agencies to choose another hospice to interview. Discussion held regarding LTC in a nursing facility. She reports she cannot afford that. LSW provided information about Medicaid application. She reports she is fearful she might lose some of her finances if she applies. Discussion held regarding differences between hospice services and Home Care. She reports he is bedbound at home and he wears depends. She is the sole caregiver. JARED arranged for stretcher transport for 10 pm tonight.   [place], [place]     Case Management   [phone]    12/28/2019  5:45 PM

## 2019-12-28 NOTE — CARE COORDINATION
JARED was called back to the room to speak with family as they now want a Nursing facility. ROSEW gowned and gloved to reenter the room. They both decided that caring for him is too much and they want him to go to [facility]. JARED informed him that it would be private pay and usually they require monies up front. They would like for him to go to [facility]. Call placed to [place] at [facility]. She reports she is familiar with this patient and she would need to complete a face to face interview with them and can do this tomorrow. She also reports he is out of his medicare days and it would be private pay to get in somewhere around $3500. The facility will assist them with medicaid application. [place] will work with family tomorrow. JARED informed wife of above. She is agreeable to this and will go ahead with mirna castellon.   [place], [place]     Case Management   [phone]    12/28/2019  6:22 PM

## 2019-12-28 NOTE — DISCHARGE INSTR - COC
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    Altered mental status R41.82    Dilatation of colon K59.39    Ileus (HCC) K56.7    Moderate malnutrition (HCC) E44.0    Syncope, near R55    Hepatic encephalopathy (HCC) K72.90       Isolation/Infection:   Isolation          Contact        Patient Infection Status     Infection Onset Added Last Indicated Last Indicated By Review Planned Expiration Resolved Resolved By    VRE 07/20/19 07/22/19 07/20/19 Urine Culture        MDRO (multi-drug resistant organism) 07/02/19 07/04/19 07/02/19 Urine Culture              Nurse Assessment:  Last Vital Signs: /76   Pulse 80   Temp 98.5 °F (36.9 °C) (Oral)   Resp 15   Ht 6' (1.829 m)   Wt 168 lb 14 oz (76.6 kg)   SpO2 97%   BMI 22.90 kg/m²     Last documented pain score (0-10 scale): Pain Level: 0  Last Weight:   Wt Readings from Last 1 Encounters:   12/28/19 168 lb 14 oz (76.6 kg)     Mental Status:  alert to self    IV Access:  - None    Nursing Mobility/ADLs:  Walking   Dependent  Transfer  Dependent  Bathing  Dependent  Dressing  Dependent  Toileting  Dependent  Feeding  Assisted  Med Admin  Dependent  Med Delivery   whole    Wound Care Documentation and Therapy:        Elimination:  Continence:   · Bowel: No  · Bladder: N/A  Urinary Catheter: Last Change Date 12/23/2019   Colostomy/Ileostomy/Ileal Conduit: No       Date of Last BM: 12/26/2019    Intake/Output Summary (Last 24 hours) at 12/28/2019 1548  Last data filed at 12/28/2019 1347  Gross per 24 hour   Intake 960 ml   Output 1550 ml   Net -590 ml     I/O last 3 completed shifts: In: 12 [P.O.:960]  Out: 1550 [Urine:1550]    Safety Concerns:      At Risk for Falls    Impairments/Disabilities:      Speech ; delayed speech    Nutrition Therapy:  Current Nutrition Therapy:   - Oral Diet:  Dental Soft    Routes of

## 2019-12-28 NOTE — PLAN OF CARE
Problem: Falls - Risk of:  Goal: Will remain free from falls  Description  Will remain free from falls  12/27/2019 2356 by Chanel Balderrama RN  Outcome: Ongoing  12/27/2019 1108 by Bayron Boudreaux RN  Outcome: Ongoing  Goal: Absence of physical injury  Description  Absence of physical injury  12/27/2019 1108 by Bayron Boudreaux RN  Outcome: Ongoing     Problem: Risk for Impaired Skin Integrity  Goal: Tissue integrity - skin and mucous membranes  Description  Structural intactness and normal physiological function of skin and  mucous membranes.   12/27/2019 1108 by Bayron Boudreaux RN  Outcome: Ongoing     Problem: Sensory:  Goal: General experience of comfort will improve  Description  General experience of comfort will improve  12/27/2019 2356 by Chanel Balderrama RN  Outcome: Ongoing  12/27/2019 1108 by Bayron Boudreaux RN  Outcome: Ongoing     Problem: Urinary Elimination:  Goal: Signs and symptoms of infection will decrease  Description  Signs and symptoms of infection will decrease  12/27/2019 1108 by Bayron Boudreaux RN  Outcome: Ongoing  Goal: Ability to reestablish a normal urinary elimination pattern will improve - after catheter removal  Description  Ability to reestablish a normal urinary elimination pattern will improve  12/27/2019 2356 by Chanel Balderrama RN  Outcome: Ongoing  12/27/2019 1108 by Bayron Boudreaux RN  Outcome: Ongoing  Goal: Complications related to the disease process, condition or treatment will be avoided or minimized  Description  Complications related to the disease process, condition or treatment will be avoided or minimized  12/27/2019 1108 by Bayron Boudreaux RN  Outcome: Ongoing     Problem: SAFETY  Goal: Free from accidental physical injury  12/27/2019 1108 by Bayron Boudreaux RN  Outcome: Ongoing     Problem: DAILY CARE  Goal: Daily care needs are met  12/27/2019 2356 by Chanel Balderrama RN  Outcome: Ongoing  12/27/2019 1108 by Bayron Boudreaux RN  Outcome: Ongoing     Problem: PAIN  Goal: activity will improve  12/27/2019 1108 by Damion Ruby RN  Outcome: Ongoing

## 2019-12-28 NOTE — PLAN OF CARE
Patient's continuum of care needs are met  Outcome: Ongoing     Problem: Nutrition  Goal: Optimal nutrition therapy  Outcome: Ongoing     Problem: Skin Integrity:  Goal: Absence of new skin breakdown  Description  Absence of new skin breakdown  Outcome: Ongoing     Problem: Fluid Volume:  Goal: Ability to achieve a balanced intake and output will improve  Description  Ability to achieve a balanced intake and output will improve  Outcome: Ongoing     Problem: Physical Regulation:  Goal: Ability to maintain clinical measurements within normal limits will improve  Description  Ability to maintain clinical measurements within normal limits will improve  Outcome: Ongoing     Problem: Physical Regulation:  Goal: Will show no signs and symptoms of electrolyte imbalance  Description  Will show no signs and symptoms of electrolyte imbalance  Outcome: Ongoing     Problem:  Activity:  Goal: Ability to tolerate increased activity will improve  Description  Ability to tolerate increased activity will improve  Outcome: Ongoing

## (undated) DEVICE — ENDOSCOPY KIT: Brand: MEDLINE INDUSTRIES, INC.